# Patient Record
Sex: MALE | Race: WHITE | NOT HISPANIC OR LATINO | URBAN - METROPOLITAN AREA
[De-identification: names, ages, dates, MRNs, and addresses within clinical notes are randomized per-mention and may not be internally consistent; named-entity substitution may affect disease eponyms.]

---

## 2022-01-20 ENCOUNTER — APPOINTMENT (OUTPATIENT)
Dept: PHYSICAL THERAPY | Age: 47
End: 2022-01-20

## 2022-01-20 PROCEDURE — 97530 THERAPEUTIC ACTIVITIES: CPT

## 2024-01-29 ENCOUNTER — HOSPITAL ENCOUNTER (EMERGENCY)
Facility: HOSPITAL | Age: 49
Discharge: HOME | End: 2024-01-29
Attending: EMERGENCY MEDICINE | Admitting: EMERGENCY MEDICINE
Payer: COMMERCIAL

## 2024-01-29 ENCOUNTER — OFFICE VISIT (OUTPATIENT)
Dept: URGENT CARE | Age: 49
End: 2024-01-29
Payer: COMMERCIAL

## 2024-01-29 ENCOUNTER — APPOINTMENT (EMERGENCY)
Dept: RADIOLOGY | Facility: HOSPITAL | Age: 49
End: 2024-01-29
Payer: COMMERCIAL

## 2024-01-29 VITALS
BODY MASS INDEX: 34.05 KG/M2 | OXYGEN SATURATION: 96 % | TEMPERATURE: 98.4 F | DIASTOLIC BLOOD PRESSURE: 61 MMHG | HEART RATE: 88 BPM | WEIGHT: 204.59 LBS | SYSTOLIC BLOOD PRESSURE: 134 MMHG | RESPIRATION RATE: 15 BRPM

## 2024-01-29 VITALS
TEMPERATURE: 97.8 F | SYSTOLIC BLOOD PRESSURE: 109 MMHG | OXYGEN SATURATION: 97 % | DIASTOLIC BLOOD PRESSURE: 80 MMHG | RESPIRATION RATE: 18 BRPM | HEART RATE: 128 BPM

## 2024-01-29 DIAGNOSIS — R06.09 DYSPNEA ON EXERTION: Primary | ICD-10-CM

## 2024-01-29 DIAGNOSIS — R50.9 FEVER, UNSPECIFIED FEVER CAUSE: ICD-10-CM

## 2024-01-29 DIAGNOSIS — I47.20 VENTRICULAR TACHYCARDIA (HCC): Primary | ICD-10-CM

## 2024-01-29 DIAGNOSIS — I48.92 ATRIAL FLUTTER, UNSPECIFIED TYPE (HCC): ICD-10-CM

## 2024-01-29 LAB
2HR DELTA HS TROPONIN: 19 NG/L
ALBUMIN SERPL BCP-MCNC: 4.8 G/DL (ref 3.5–5)
ALP SERPL-CCNC: 60 U/L (ref 34–104)
ALT SERPL W P-5'-P-CCNC: 21 U/L (ref 7–52)
ANION GAP SERPL CALCULATED.3IONS-SCNC: 11 MMOL/L
AST SERPL W P-5'-P-CCNC: 18 U/L (ref 13–39)
BASOPHILS # BLD AUTO: 0.05 THOUSANDS/ÂΜL (ref 0–0.1)
BASOPHILS NFR BLD AUTO: 0 % (ref 0–1)
BILIRUB SERPL-MCNC: 1.32 MG/DL (ref 0.2–1)
BUN SERPL-MCNC: 16 MG/DL (ref 5–25)
CALCIUM SERPL-MCNC: 9.9 MG/DL (ref 8.4–10.2)
CARDIAC TROPONIN I PNL SERPL HS: 110 NG/L
CARDIAC TROPONIN I PNL SERPL HS: 91 NG/L
CHLORIDE SERPL-SCNC: 98 MMOL/L (ref 96–108)
CO2 SERPL-SCNC: 27 MMOL/L (ref 21–32)
CREAT SERPL-MCNC: 1.45 MG/DL (ref 0.6–1.3)
EOSINOPHIL # BLD AUTO: 0.11 THOUSAND/ÂΜL (ref 0–0.61)
EOSINOPHIL NFR BLD AUTO: 1 % (ref 0–6)
ERYTHROCYTE [DISTWIDTH] IN BLOOD BY AUTOMATED COUNT: 13.2 % (ref 11.6–15.1)
GFR SERPL CREATININE-BSD FRML MDRD: 56 ML/MIN/1.73SQ M
GLUCOSE SERPL-MCNC: 123 MG/DL (ref 65–140)
HCT VFR BLD AUTO: 46.9 % (ref 36.5–49.3)
HGB BLD-MCNC: 16 G/DL (ref 12–17)
IMM GRANULOCYTES # BLD AUTO: 0.04 THOUSAND/UL (ref 0–0.2)
IMM GRANULOCYTES NFR BLD AUTO: 0 % (ref 0–2)
LYMPHOCYTES # BLD AUTO: 2.06 THOUSANDS/ÂΜL (ref 0.6–4.47)
LYMPHOCYTES NFR BLD AUTO: 14 % (ref 14–44)
MAGNESIUM SERPL-MCNC: 1.7 MG/DL (ref 1.9–2.7)
MCH RBC QN AUTO: 30.8 PG (ref 26.8–34.3)
MCHC RBC AUTO-ENTMCNC: 34.1 G/DL (ref 31.4–37.4)
MCV RBC AUTO: 90 FL (ref 82–98)
MONOCYTES # BLD AUTO: 1.47 THOUSAND/ÂΜL (ref 0.17–1.22)
MONOCYTES NFR BLD AUTO: 10 % (ref 4–12)
NEUTROPHILS # BLD AUTO: 10.8 THOUSANDS/ÂΜL (ref 1.85–7.62)
NEUTS SEG NFR BLD AUTO: 75 % (ref 43–75)
NRBC BLD AUTO-RTO: 0 /100 WBCS
PLATELET # BLD AUTO: 290 THOUSANDS/UL (ref 149–390)
PMV BLD AUTO: 11.3 FL (ref 8.9–12.7)
POTASSIUM SERPL-SCNC: 3.9 MMOL/L (ref 3.5–5.3)
PROT SERPL-MCNC: 8.2 G/DL (ref 6.4–8.4)
RBC # BLD AUTO: 5.19 MILLION/UL (ref 3.88–5.62)
SARS-COV-2 AG UPPER RESP QL IA: NEGATIVE
SODIUM SERPL-SCNC: 136 MMOL/L (ref 135–147)
TSH SERPL DL<=0.05 MIU/L-ACNC: 2.61 UIU/ML (ref 0.45–4.5)
VALID CONTROL: NORMAL
WBC # BLD AUTO: 14.53 THOUSAND/UL (ref 4.31–10.16)

## 2024-01-29 PROCEDURE — 93005 ELECTROCARDIOGRAM TRACING: CPT

## 2024-01-29 PROCEDURE — 71045 X-RAY EXAM CHEST 1 VIEW: CPT

## 2024-01-29 PROCEDURE — 80053 COMPREHEN METABOLIC PANEL: CPT

## 2024-01-29 PROCEDURE — 84443 ASSAY THYROID STIM HORMONE: CPT

## 2024-01-29 PROCEDURE — 99291 CRITICAL CARE FIRST HOUR: CPT | Performed by: EMERGENCY MEDICINE

## 2024-01-29 PROCEDURE — 87811 SARS-COV-2 COVID19 W/OPTIC: CPT | Performed by: STUDENT IN AN ORGANIZED HEALTH CARE EDUCATION/TRAINING PROGRAM

## 2024-01-29 PROCEDURE — G0383 LEV 4 HOSP TYPE B ED VISIT: HCPCS | Performed by: STUDENT IN AN ORGANIZED HEALTH CARE EDUCATION/TRAINING PROGRAM

## 2024-01-29 PROCEDURE — 84484 ASSAY OF TROPONIN QUANT: CPT

## 2024-01-29 PROCEDURE — 36415 COLL VENOUS BLD VENIPUNCTURE: CPT

## 2024-01-29 PROCEDURE — 93005 ELECTROCARDIOGRAM TRACING: CPT | Performed by: STUDENT IN AN ORGANIZED HEALTH CARE EDUCATION/TRAINING PROGRAM

## 2024-01-29 PROCEDURE — 99285 EMERGENCY DEPT VISIT HI MDM: CPT

## 2024-01-29 PROCEDURE — 85025 COMPLETE CBC W/AUTO DIFF WBC: CPT

## 2024-01-29 PROCEDURE — 92960 CARDIOVERSION ELECTRIC EXT: CPT | Performed by: EMERGENCY MEDICINE

## 2024-01-29 PROCEDURE — 99152 MOD SED SAME PHYS/QHP 5/>YRS: CPT | Performed by: EMERGENCY MEDICINE

## 2024-01-29 PROCEDURE — 96374 THER/PROPH/DIAG INJ IV PUSH: CPT

## 2024-01-29 PROCEDURE — 83735 ASSAY OF MAGNESIUM: CPT

## 2024-01-29 PROCEDURE — 92960 CARDIOVERSION ELECTRIC EXT: CPT

## 2024-01-29 RX ORDER — ETOMIDATE 2 MG/ML
0.15 INJECTION INTRAVENOUS ONCE
Status: COMPLETED | OUTPATIENT
Start: 2024-01-29 | End: 2024-01-29

## 2024-01-29 RX ADMIN — APIXABAN 5 MG: 5 TABLET, FILM COATED ORAL at 15:09

## 2024-01-29 RX ADMIN — ETOMIDATE 10 MG: 2 INJECTION INTRAVENOUS at 13:52

## 2024-01-29 NOTE — DISCHARGE INSTRUCTIONS
You were seen and evaluated in the emergency department for rapid heart rate and shortness of breath with exertion.  Were in rapid a flutter was successfully cardioverted to normal sinus rhythm.  Will send anticoagulation medication to the pharmacy it is 10 mg twice a day for 1 week followed by 5 mg twice a day for the remaining 2 weeks.  Please follow-up with your PCP within 48 hours.  If you do not have 1 call the info link number above to establish care.  Will also send referral to cardiology please follow-up with them.  If your symptoms worsen persist or recur please return to the emergency department for further evaluation and management

## 2024-01-29 NOTE — ED PROVIDER NOTES
History  Chief Complaint   Patient presents with    Shortness of Breath     Pt reports dyspnea on exertion since Saturday. C/o L sided abdominal pain, vomiting, and heart burn on Saturday, but has mostly since resolved. Pt sent for eval from [facility].     Patient is a 48-year-old male presenting for evaluation of shortness of breath.  Patient states that he was ill with a GI bug several days ago had nausea vomiting diarrhea at that time.  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  States that it is improved with rest.  Also endorses some palpitations denies chest pain fever chills nausea vomiting diaphoresis abdominal pain neck pain back pain numbness weakness tingling or any other complaints at this time.  States that this is never happened before.          None       Past Medical History:   Diagnosis Date    Asthma     Heart murmur        No past surgical history on file.    No family history on file.  I have reviewed and agree with the history as documented.    E-Cigarette/Vaping     E-Cigarette/Vaping Substances     Social History     Tobacco Use    Smoking status: Never     Passive exposure: Never    Smokeless tobacco: Never        Review of Systems   Constitutional:  Negative for chills and fever.   HENT:  Negative for congestion.    Respiratory:  Positive for shortness of breath. Negative for cough.    Cardiovascular:  Positive for palpitations. Negative for chest pain.   Gastrointestinal:  Negative for abdominal pain, nausea and vomiting.   Musculoskeletal:  Negative for back pain and neck pain.   Skin:  Negative for rash.   Neurological:  Negative for weakness, numbness and headaches.   All other systems reviewed and are negative.      Physical Exam  ED Triage Vitals [01/29/24 1204]   Temperature Pulse Respirations Blood Pressure SpO2   98.4 °F (36.9 °C)  (!) 130 (!) 26 123/73 99 %      Temp Source Heart Rate Source Patient Position - Orthostatic VS BP Location FiO2 (%)   Oral Monitor -- -- --      Pain Score       No Pain             Orthostatic Vital Signs  Vitals:    01/29/24 1355 01/29/24 1401 01/29/24 1405 01/29/24 1410   BP: 147/81 (!) 200/100 133/68 133/68   Pulse: 92 99 97 91       Physical Exam  Vitals and nursing note reviewed.   Constitutional:       General: He is not in acute distress.     Appearance: He is well-developed. He is not ill-appearing or diaphoretic.   HENT:      Head: Normocephalic and atraumatic.      Mouth/Throat:      Mouth: Mucous membranes are moist.   Eyes:      Extraocular Movements: Extraocular movements intact.      Conjunctiva/sclera: Conjunctivae normal.   Cardiovascular:      Rate and Rhythm: Regular rhythm. Tachycardia present.      Heart sounds: No murmur heard.  Pulmonary:      Effort: Pulmonary effort is normal. No respiratory distress.      Breath sounds: Normal breath sounds.   Abdominal:      Palpations: Abdomen is soft.      Tenderness: There is no abdominal tenderness.   Musculoskeletal:         General: No swelling. Normal range of motion.      Cervical back: Normal range of motion and neck supple.      Right lower leg: No edema.      Left lower leg: No edema.   Skin:     General: Skin is warm and dry.      Capillary Refill: Capillary refill takes less than 2 seconds.   Neurological:      General: No focal deficit present.      Mental Status: He is alert and oriented to person, place, and time.         ED Medications  Medications   apixaban (ELIQUIS) tablet 5 mg (has no administration in time range)   etomidate (AMIDATE) 2 mg/mL injection 14 mg (10 mg Intravenous Given 1/29/24 1352)       Diagnostic Studies  Results Reviewed       Procedure Component Value Units Date/Time    TSH, 3rd generation with Free T4 reflex [[id number]]  (Normal) Collected: 01/29/24 1235    Lab Status: Final result Specimen: Blood from Arm, Left  Updated: 01/29/24 1406     TSH 3RD GENERATON 2.613 uIU/mL     HS Troponin I 2hr [[id number]]     Lab Status: No result Specimen: Blood     HS Troponin 0hr (reflex protocol) [[id number]]  (Abnormal) Collected: 01/29/24 1235    Lab Status: Final result Specimen: Blood from Arm, Left Updated: 01/29/24 1324     hs TnI 0hr 91 ng/L     Comprehensive metabolic panel [[id number]]  (Abnormal) Collected: 01/29/24 1235    Lab Status: Final result Specimen: Blood from Arm, Left Updated: 01/29/24 1305     Sodium 136 mmol/L      Potassium 3.9 mmol/L      Chloride 98 mmol/L      CO2 27 mmol/L      ANION GAP 11 mmol/L      BUN 16 mg/dL      Creatinine 1.45 mg/dL      Glucose 123 mg/dL      Calcium 9.9 mg/dL      AST 18 U/L      ALT 21 U/L      Alkaline Phosphatase 60 U/L      Total Protein 8.2 g/dL      Albumin 4.8 g/dL      Total Bilirubin 1.32 mg/dL      eGFR 56 ml/min/1.73sq m     Narrative:      National Kidney Disease Foundation guidelines for Chronic Kidney Disease (CKD):     Stage 1 with normal or high GFR (GFR > 90 mL/min/1.73 square meters)    Stage 2 Mild CKD (GFR = 60-89 mL/min/1.73 square meters)    Stage 3A Moderate CKD (GFR = 45-59 mL/min/1.73 square meters)    Stage 3B Moderate CKD (GFR = 30-44 mL/min/1.73 square meters)    Stage 4 Severe CKD (GFR = 15-29 mL/min/1.73 square meters)    Stage 5 End Stage CKD (GFR <15 mL/min/1.73 square meters)  Note: GFR calculation is accurate only with a steady state creatinine    Magnesium [[id number]]  (Abnormal) Collected: 01/29/24 1235    Lab Status: Final result Specimen: Blood from Arm, Left Updated: 01/29/24 1305     Magnesium 1.7 mg/dL     CBC and differential [[id number]]  (Abnormal) Collected: 01/29/24 1235    Lab Status: Final result Specimen: Blood from Arm, Left Updated: 01/29/24 1247     WBC 14.53 Thousand/uL      RBC 5.19 Million/uL      Hemoglobin 16.0 g/dL      Hematocrit 46.9 %      MCV 90 fL      MCH 30.8 pg      MCHC 34.1 g/dL      RDW 13.2 %      MPV 11.3 fL       Platelets 290 Thousands/uL      nRBC 0 /100 WBCs      Neutrophils Relative 75 %      Immat GRANS % 0 %      Lymphocytes Relative 14 %      Monocytes Relative 10 %      Eosinophils Relative 1 %      Basophils Relative 0 %      Neutrophils Absolute 10.80 Thousands/µL      Immature Grans Absolute 0.04 Thousand/uL      Lymphocytes Absolute 2.06 Thousands/µL      Monocytes Absolute 1.47 Thousand/µL      Eosinophils Absolute 0.11 Thousand/µL      Basophils Absolute 0.05 Thousands/µL                    XR chest 1 view portable   ED Interpretation by Willian Dean DO (01/29 1150)   Wet read - normal cxr            Procedures  Cardioversion    Date/Time: 1/29/2024 2:03 PM    Performed by: Willian Dean DO  Authorized by: Willian Dean DO    Verbal consent obtained?: Yes    Written consent obtained?: Yes    Risks and benefits: Risks, benefits and alternatives were discussed    Consent given by:  Patient  Patient states understanding of procedure being performed: Yes    Patient's understanding of procedure matches consent: Yes    Patient identity confirmed:  Arm band and verbally with patient  Time out: Immediately prior to the procedure a time out was called    Patient sedated: Yes    Sedation type: moderate (conscious) sedation    Sedation:  Etomidate  Sedation start:  1/29/2024 2:04 PM  Sedation end:  1/29/2024 2:04 PM  Cardioversion basis:  Elective  Pre-procedure rhythm:  Atrial flutter  Position: Patient was placed in a supine position    Chest area exposed: Chest area was exposed    Electrodes:  Pads  Electrodes placed:  Anterior-lateral  Number of attempts:  1  Attempt 1:     Attempt 1 mode:  Synchronous    Attempt 1 waveform:  Biphasic    Attempt 1 shock (Joules):  200    Attempt 1 outcome:  Conversion to normal sinus rhythm  Post-procedure rhythm:  Normal sinus rhythm  Complications: no complications    Patient tolerance:  Patient tolerated the procedure well with no immediate complications  Pre-Procedural  Sedation    Performed by: Willian Dean DO  Authorized by: Willian Dean DO    Consent:     Consent obtained:  Written and verbal    Consent given by:  Patient    Risks discussed:  Allergic reaction, dysrhythmia, inadequate sedation, respiratory compromise necessitating ventilatory assistance and intubation, prolonged sedation necessitating reversal, prolonged hypoxia resulting in organ damage, nausea and vomiting  Universal protocol:     Procedure explained and questions answered to patient or proxy's satisfaction: yes      Relevant documents present and verified: yes      Patient identity confirmation method:  Arm band and verbally with patient  Indications:     Sedation purpose:  Cardioversion    Procedure necessitating sedation performed by:  Physician performing sedation    Intended level of sedation:  Moderate (conscious sedation)  Pre-sedation assessment:     NPO status caution: unable to specify NPO status      ASA classification: class 1 - normal, healthy patient      Neck mobility: normal      Mouth opening:  3 or more finger widths    Pre-sedation assessments completed and reviewed: airway patency, cardiovascular function, hydration status, mental status, nausea/vomiting, pain level, respiratory function and temperature      Pre-sedation assessment completed:  1/29/2024 2:05 PM  Comments:      Dr. Annamarie Page assisting   Procedural Sedation    Date/Time: 1/29/2024 2:06 PM    Performed by: Willian Dean DO  Authorized by: Willian Dean DO    Immediate pre-procedure details:     Reassessment: Patient reassessed immediately prior to procedure      Reviewed: vital signs and relevant labs/tests      Verified: bag valve mask available, emergency equipment available, intubation equipment available, IV patency confirmed, oxygen available and suction available    Procedure details (see MAR for exact dosages):     Sedation start time:  1/29/2024 2:06 PM    Preoxygenation:  Nasal cannula    Sedation:   Etomidate    Intra-procedure monitoring:  Blood pressure monitoring, continuous capnometry and continuous pulse oximetry    Intra-procedure events: none      Total sedation time (minutes):  5  Post-procedure details:     Attendance: Constant attendance by certified staff until patient recovered      Recovery: Patient returned to pre-procedure baseline      Post-sedation assessments completed and reviewed: airway patency, cardiovascular function, hydration status, mental status, nausea/vomiting, pain level, respiratory function and temperature      Patient is stable for discharge or admission: yes      Patient tolerance:  Tolerated well, no immediate complications  Comments:      Dr. Annamarie Page assisting   ECG 12 Lead Documentation Only    Date/Time: 2/1/2024 5:28 PM    Performed by: Willian Dean DO  Authorized by: Willian Dean DO    Indications / Diagnosis:  Dyspnea on exertion  ECG reviewed by me, the ED Provider: yes    Patient location:  ED  Interpretation:     Interpretation: abnormal    Rate:     ECG rate:  133    ECG rate assessment: tachycardic    Rhythm:     Rhythm: atrial flutter    Ectopy:     Ectopy: none    QRS:     QRS axis:  Left    QRS intervals:  Wide  Conduction:     Conduction: abnormal    ST segments:     ST segments:  Normal  T waves:     T waves: normal    ECG 12 Lead Documentation Only    Date/Time: 2/1/2024 5:30 PM    Performed by: Willian Dean DO  Authorized by: Willian Dean DO    Indications / Diagnosis:  Repeat status post cardioversion  Patient location:  ED  Previous ECG:     Previous ECG:  Compared to current    Similarity:  Changes noted  Interpretation:     Interpretation: non-specific    Rate:     ECG rate:  90    ECG rate assessment: normal    Rhythm:     Rhythm: sinus rhythm    Ectopy:     Ectopy: none    QRS:     QRS axis:  Left    QRS intervals:  Normal  Conduction:     Conduction: abnormal      Abnormal conduction: non-specific intraventricular conduction delay     ST segments:     ST segments:  Normal  T waves:     T waves: normal          ED Course                             SBIRT 22yo+      Flowsheet Row Most Recent Value   Initial Alcohol Screen: US AUDIT-C     1. How often do you have a drink containing alcohol? 2 Filed at: 01/29/2024 1209   2. How many drinks containing alcohol do you have on a typical day you are drinking?  0 Filed at: 01/29/2024 1209   3a. Male UNDER 65: How often do you have five or more drinks on one occasion? 0 Filed at: 01/29/2024 1209   Audit-C Score 2 Filed at: 01/29/2024 1209   LUCRECIA: How many times in the past year have you...    Used an illegal drug or used a prescription medication for non-medical reasons? Never Filed at: 01/29/2024 1209                  Medical Decision Making  Patient is a 48-year-old male presenting for evaluation of dyspnea on exertion    On monitor patient appears to be in a flutter.  He is consistently 133 bpm is regular, minimally wide complex however could be a flutter with bundle branch block.  Otherwise hemodynamically stable is normotensive not hypoxic.  Discussed with patient he is certain that this is the first time that this is ever happened.  Shared decision making risk versus benefits of cardioversion versus pharmacologic intervention patient wishes to undergo cardioversion see above procedure notes.  Will check labs EKG chest x-ray.  Monitor and reassess.  Final dispo pending.    Cardioversion successful patient in normal sinus rhythm.  Labs unremarkable.  First dose of Eliquis given in department prescription sent to pharmacy.  Referral sent to cardiology.  Patient to follow-up with his PCP as well.  Discussed plan and return precautions with patient who is in agreement, verbalizes understanding.  Hemodynamically stable and cleared for discharge    Amount and/or Complexity of Data Reviewed  Labs: ordered.  Radiology: ordered and independent interpretation performed.    Risk  Prescription drug  management.          Disposition  Final diagnoses:   Dyspnea on exertion   Atrial flutter, unspecified type (HCC)     Time reflects when diagnosis was documented in both MDM as applicable and the Disposition within this note       Time User Action Codes Description Comment    1/29/2024  2:18 PM Willian Dean [R06.09] Dyspnea on exertion     1/29/2024  2:19 PM Willian Dean [I48.92] Atrial flutter, unspecified type (HCC)           ED Disposition       ED Disposition   Discharge    Condition   Stable    Date/Time   Mon Jan 29, 2024 1418    Comment   Cricket Reyes discharge to home/self care.                   Follow-up Information    None         Patient's Medications    No medications on file     No discharge procedures on file.    PDMP Review       None             ED Provider  Attending physically available and evaluated Cricket Reyes. I managed the patient along with the ED Attending.    Electronically Signed by           Willian Dean DO  02/01/24 2449

## 2024-01-29 NOTE — PROGRESS NOTES
[facility]        NAME: Cricket Reyes is a 48 y.o. male  : 1975    MRN: [id number]  DATE: 2024  TIME: 11:53 AM    Assessment and Orders   Ventricular tachycardia (HCC) [I47.20]  1. Ventricular tachycardia (HCC)  CANCELED: XR chest pa & lateral      2. Fever, unspecified fever cause  Poct Covid 19 Rapid Antigen Test            Plan and Discussion      Patient present with palpitation and SOB. Patient ill over the weekend with nausea, diarrhea, vomiting and fever. Went to work this morning and felt short of breath with palpitations. EKG shows widened QRS with HR of 134.  EMS called. Patient transferred to ED for further evaluation and treatment.       Discussed ED precautions including (but not limited to)  Difficultly breathing or shortness of breath  Chest pain  Acutely worsening symptoms.     Risks and benefits discussed. Patient understands and agrees with the plan.     Follow up with PCP.     Chief Complaint     Chief Complaint   Patient presents with    Headache    Shortness of Breath    Generalized Body Aches    Fatigue    Vomiting    Abdominal Pain    Chest Pain     Patient states that his symptoms listed above started on Saturday evening and have no got better. The only time he feels comfortable is when resting. Patient is alert and oriented and the RN or DR remain with patient through out the visit for continuous assessments.         History of Present Illness       Sick over the weekend with nausea, vomiting, and diarrhea. Had fever on Saturday. Son was also sick.     Shortness of Breath  The current episode started today. The problem occurs constantly. The problem has been gradually worsening since onset. Associated symptoms include chest pressure, dizziness, fatigue and palpitations. Pertinent negatives include no chest pain, coughing or wheezing.   Generalized Body Aches  Associated symptoms include fatigue and shortness of breath. Pertinent negatives include no chest pain,  coughing or wheezing.   Fatigue  Associated symptoms include fatigue. Pertinent negatives include no chest pain or coughing.   Vomiting   Associated symptoms include dizziness. Pertinent negatives include no chest pain or coughing.   Abdominal Pain    Chest Pain   Associated symptoms include dizziness, palpitations and shortness of breath. Pertinent negatives include no cough.       Review of Systems   Review of Systems   Constitutional:  Positive for fatigue.   Respiratory:  Positive for shortness of breath. Negative for cough and wheezing.    Cardiovascular:  Positive for palpitations. Negative for chest pain.   Neurological:  Positive for dizziness.         Current Medications     No current outpatient medications on file.    Current Allergies     Allergies as of 01/29/2024    (No Known Allergies)            The following portions of the patient's history were reviewed and updated as appropriate: allergies, current medications, past family history, past medical history, past social history, past surgical history and problem list.     No past medical history on file.    No past surgical history on file.    No family history on file.      Medications have been verified.        Objective   /80   Pulse (!) 128   Temp 97.8 °F (36.6 °C)   Resp 18   SpO2 97%   No LMP for male patient.       Physical Exam     Physical Exam  Cardiovascular:      Rate and Rhythm: Tachycardia present.      Pulses: Normal pulses.      Heart sounds: Murmur heard.      Comments: Patient worse when lying back.   Pulmonary:      Effort: Tachypnea present. No respiratory distress.      Breath sounds: Normal breath sounds.   Abdominal:      Tenderness: There is abdominal tenderness (mild, generalized).   Musculoskeletal:      Right lower leg: No edema.      Left lower leg: No edema.   Skin:     Coloration: Skin is pale.   Neurological:      General: No focal deficit present.      Mental Status: He is alert and oriented to person, place,  and time.               Nayla Crane DO

## 2024-01-29 NOTE — Clinical Note
Cricket Reyes was seen and treated in our emergency department on 1/29/2024.    No restrictions            Diagnosis: atrial flutter    Cricket  may return to work on return date.    He may return on this date: 01/30/2024         If you have any questions or concerns, please don't hesitate to call.      Willian Dean, DO    ______________________________           _______________          _______________  Hospital Representative                              Date                                Time

## 2024-01-29 NOTE — ED ATTENDING ATTESTATION
Final Diagnoses:   No diagnosis found.  ED Course as of 01/29/24 1413   Mon Jan 29, 2024   1413 Repeat EKG after cardioversion shows sinus rhythm without any ST changes still nonspecific intraventricular conduction delay       INestor DO, saw and evaluated the patient. All available labs and X-rays were ordered by me or the resident / non-physician and have been reviewed by myself. I discussed the patient with the resident / non-physician and agree with the resident's / non-physician practitioner's findings and plan as documented in the resident's / non-physician practicitioner's note, except where noted.   At this point, I agree with the current assessment done in the ED.   I was present during key portions of all procedures performed unless otherwise stated.     Nursing Triage:     Chief Complaint   Patient presents with    Shortness of Breath     Pt reports dyspnea on exertion since Saturday. C/o L sided abdominal pain, vomiting, and heart burn on Saturday, but has mostly since resolved. Pt sent for eval from [facility].       HPI:   This is a 48 y.o. male presenting for evaluation of palpitations.  He was feeling sick for the past few days.  Woke up today was feeling slightly better but then developed palpitations.  No chest pain right now.  No shortness of breath until today.  No previous history of similar instances.  Was at urgent care and sent here    ASSESSMENT + PLAN:   Palpitations EKG interpreted by me as atrial flutter 2-1 block with intraventricular conduction delay at a rate of 133 no ST changes  Plan cardioversion, anticoagulation, labs for metabolic derangement IV fluids    Physical:     Vital signs reviewed.  Gen. appearance: No acute distress.   Head: Atraumatic, normocephalic.  Eyes: EOMI, PERRLA. No icterus.  Neck: Supple, no lymphadenopathy, full range of motion.  Chest: Regular rate and rhythm. Lungs are clear to auscultation bilaterally. Nontender.  Abdomen: Soft nontender  "nondistended. No signs of ecchymosis. Positive bowel sounds.  Extremities: Full range of motion in all extremities.  Neuro: non focal exam  Skin: Warm, dry.            Past Medical:    has a past medical history of Asthma and Heart murmur.    Past Surgical:    has no past surgical history on file.      XR chest 1 view portable   ED Interpretation by Willian Dean DO (01/29 5972)   Wet read - normal cxr          CriticalCare Time    Date/Time: 1/29/2024 2:13 PM    Performed by: Nestor Davila DO  Authorized by: Nestor Davila DO    Critical care provider statement:     Critical care time (minutes):  30    Critical care time was exclusive of:  Separately billable procedures and treating other patients and teaching time    Critical care was necessary to treat or prevent imminent or life-threatening deterioration of the following conditions:  Cardiac failure    Critical care was time spent personally by me on the following activities:  Blood draw for specimens, obtaining history from patient or surrogate, re-evaluation of patient's condition, review of old charts, evaluation of patient's response to treatment, examination of patient, ordering and review of laboratory studies, ordering and performing treatments and interventions, development of treatment plan with patient or surrogate and ordering and review of radiographic studies        I reviewed patient's most recent discharge summary and/or outpatient office notes.      Portions of the record may have been created with voice recognition software. Occasional wrong word or \"sound a like\" substitutions may have occurred due to the inherent limitations of voice recognition software. Read the chart carefully and recognize, using context, where substitutions have occurred.    Electronically signed:  Nestor Davila DO  "

## 2024-01-30 ENCOUNTER — TELEPHONE (OUTPATIENT)
Age: 49
End: 2024-01-30

## 2024-01-30 ENCOUNTER — OFFICE VISIT (OUTPATIENT)
Dept: FAMILY MEDICINE CLINIC | Facility: CLINIC | Age: 49
End: 2024-01-30
Payer: COMMERCIAL

## 2024-01-30 ENCOUNTER — TELEPHONE (OUTPATIENT)
Dept: FAMILY MEDICINE CLINIC | Facility: CLINIC | Age: 49
End: 2024-01-30

## 2024-01-30 VITALS
TEMPERATURE: 97.3 F | BODY MASS INDEX: 36.29 KG/M2 | HEART RATE: 84 BPM | DIASTOLIC BLOOD PRESSURE: 66 MMHG | OXYGEN SATURATION: 98 % | WEIGHT: 212.6 LBS | HEIGHT: 64 IN | SYSTOLIC BLOOD PRESSURE: 132 MMHG

## 2024-01-30 DIAGNOSIS — Z12.5 PROSTATE CANCER SCREENING: ICD-10-CM

## 2024-01-30 DIAGNOSIS — R21 SKIN RASH: ICD-10-CM

## 2024-01-30 DIAGNOSIS — Z11.4 SCREENING FOR HIV (HUMAN IMMUNODEFICIENCY VIRUS): ICD-10-CM

## 2024-01-30 DIAGNOSIS — E83.42 HYPOMAGNESEMIA: ICD-10-CM

## 2024-01-30 DIAGNOSIS — I48.92 ATRIAL FLUTTER, UNSPECIFIED TYPE (HCC): Primary | ICD-10-CM

## 2024-01-30 DIAGNOSIS — I35.8 HEART MURMUR, AORTIC: ICD-10-CM

## 2024-01-30 DIAGNOSIS — I48.92 ATRIAL FLUTTER, UNSPECIFIED TYPE (HCC): ICD-10-CM

## 2024-01-30 DIAGNOSIS — Z12.11 COLON CANCER SCREENING: Primary | ICD-10-CM

## 2024-01-30 DIAGNOSIS — E78.00 HYPERCHOLESTEREMIA: ICD-10-CM

## 2024-01-30 DIAGNOSIS — Z11.59 NEED FOR HEPATITIS C SCREENING TEST: ICD-10-CM

## 2024-01-30 LAB
ATRIAL RATE: 268 BPM
ATRIAL RATE: 268 BPM
ATRIAL RATE: 90 BPM
P AXIS: 260 DEGREES
P AXIS: 263 DEGREES
P AXIS: 63 DEGREES
PR INTERVAL: 140 MS
QRS AXIS: -62 DEGREES
QRS AXIS: 241 DEGREES
QRS AXIS: 255 DEGREES
QRSD INTERVAL: 126 MS
QRSD INTERVAL: 128 MS
QRSD INTERVAL: 130 MS
QT INTERVAL: 316 MS
QT INTERVAL: 344 MS
QT INTERVAL: 348 MS
QTC INTERVAL: 425 MS
QTC INTERVAL: 471 MS
QTC INTERVAL: 513 MS
T WAVE AXIS: 30 DEGREES
T WAVE AXIS: 41 DEGREES
T WAVE AXIS: 89 DEGREES
VENTRICULAR RATE: 134 BPM
VENTRICULAR RATE: 134 BPM
VENTRICULAR RATE: 90 BPM

## 2024-01-30 PROCEDURE — 99204 OFFICE O/P NEW MOD 45 MIN: CPT | Performed by: FAMILY MEDICINE

## 2024-01-30 RX ORDER — UREA 10 %
500 LOTION (ML) TOPICAL DAILY
Qty: 30 TABLET | Refills: 3 | Status: SHIPPED | OUTPATIENT
Start: 2024-01-30

## 2024-01-30 NOTE — LETTER
January 30, 2024     Patient: Cricket Reyes  YOB: 1975  Date of Visit: 1/30/2024      To Whom it May Concern:    Cricket Reyes is under my professional care. Cricket was seen in my office on 1/30/2024. Cricket may return to work on 1/31/2024 .    If you have any questions or concerns, please don't hesitate to call.         Sincerely,                Tushar Mahoney, DO

## 2024-01-30 NOTE — TELEPHONE ENCOUNTER
Pt contacted the office again looking for a update on his medication refill. Call was warm transferred to office no additional questions

## 2024-01-30 NOTE — PROGRESS NOTES
Name: Cricket Reyes      : 1975      MRN: [id number]  Encounter Provider: Tushar Mahoney DO  Encounter Date: 2024   Encounter department: [facility]    Chief Complaint   Patient presents with    New Patient Visit    Follow-up     From ED      BMI Counseling: Body mass index is 36.49 kg/m². The BMI is above normal. Nutrition recommendations include reducing portion sizes, consuming healthier snacks, moderation in carbohydrate intake, and increasing intake of lean protein.    PHQ-2/9 Depression Screening    Little interest or pleasure in doing things: 0 - not at all  Feeling down, depressed, or hopeless: 0 - not at all  PHQ-2 Score: 0  PHQ-2 Interpretation: Negative depression screen         Assessment & Plan     1. Colon cancer screening  -     Cologuard  -     Cologuard    2. Need for hepatitis C screening test    3. Screening for HIV (human immunodeficiency virus)    4. Atrial flutter, unspecified type (HCC)  -     Ambulatory Referral to Cardiology; Future  -     Echo complete w/ contrast if indicated; Future; Expected date: 2024    5. Hypomagnesemia  -     magnesium gluconate (MAGONATE) 500 mg tablet; Take 1 tablet (500 mg total) by mouth in the morning    6. Hypercholesteremia  -     Cholesterol, total; Future    7. Prostate cancer screening  -     PSA, Total Screen; Future    8. Skin rash  -     Ambulatory Referral to Dermatology; Future    9. Heart murmur, aortic           Subjective     Seen in ER, Atrial flutter.  Pleasant pt.  SH: Neg tob, social [place] weekends.  [place]  Weight , frequents the GYM.  Rash posterior hair line.  Non successful treatment so far.      Review of Systems   Constitutional: Negative.    HENT: Negative.     Eyes: Negative.    Respiratory: Negative.     Cardiovascular: Negative.    Gastrointestinal: Negative.    Genitourinary: Negative.    Musculoskeletal: Negative.    Skin:  Positive for rash.   Neurological: Negative.   "  Psychiatric/Behavioral: Negative.         Past Medical History:   Diagnosis Date    Asthma     Heart murmur      Past Surgical History:   Procedure Laterality Date    ARTHROSCOPIC REPAIR ACL Right     HERNIA REPAIR       Family History   Problem Relation Age of Onset    Arthritis Father     LUDA disease Father     Cancer Sister     Epididymitis Daughter     Diabetes Paternal Aunt     Diabetes Paternal Uncle      Social History     Socioeconomic History    Marital status: Single     Spouse name: None    Number of children: None    Years of education: None    Highest education level: None   Occupational History    None   Tobacco Use    Smoking status: Never     Passive exposure: Never    Smokeless tobacco: Never   Vaping Use    Vaping status: Never Used   Substance and Sexual Activity    Alcohol use: None    Drug use: None    Sexual activity: None   Other Topics Concern    None   Social History Narrative    None     Social Determinants of Health     Financial Resource Strain: Not on file   Food Insecurity: Not on file   Transportation Needs: Not on file   Physical Activity: Not on file   Stress: Not on file   Social Connections: Not on file   Intimate Partner Violence: Not on file   Housing Stability: Not on file     Current Outpatient Medications on File Prior to Visit   Medication Sig    apixaban (Eliquis) 5 mg Take 1 tablet (5 mg total) by mouth 2 (two) times a day for 28 days Take 10mg twice a day for the first week and then 5mg twice a day for the remainder     No Known Allergies  Immunization History   Administered Date(s) Administered    COVID-19 PFIZER VACCINE 0.3 ML IM 11/12/2021, 12/03/2021    COVID-19 Pfizer vac (Will-sucrose, gray cap) 12 yr+ IM 07/13/2022       Objective     /66 (BP Location: Left arm, Patient Position: Sitting, Cuff Size: Standard)   Pulse 84   Temp (!) 97.3 °F (36.3 °C) (Temporal)   Ht 5' 4\" (1.626 m)   Wt 96.4 kg (212 lb 9.6 oz)   SpO2 98%   BMI 36.49 kg/m²     Physical " Exam  Constitutional:       Appearance: He is well-developed.   HENT:      Head: Normocephalic and atraumatic.      Right Ear: Tympanic membrane, ear canal and external ear normal.      Left Ear: Tympanic membrane, ear canal and external ear normal.      Nose: Nose normal.      Mouth/Throat:      Mouth: Mucous membranes are moist.      Pharynx: Oropharynx is clear.   Eyes:      Conjunctiva/sclera: Conjunctivae normal.      Pupils: Pupils are equal, round, and reactive to light.   Cardiovascular:      Rate and Rhythm: Normal rate and regular rhythm.      Pulses: Normal pulses.      Heart sounds: Normal heart sounds.      Comments: Mild Aortic valve murmur.  Pulmonary:      Effort: Pulmonary effort is normal.      Breath sounds: Normal breath sounds.   Abdominal:      General: Bowel sounds are normal.      Palpations: Abdomen is soft.   Musculoskeletal:      Cervical back: Normal range of motion and neck supple.   Skin:     General: Skin is warm and dry.      Findings: Rash present.      Comments: Hair line back of head.   Neurological:      Mental Status: He is alert and oriented to person, place, and time.      Deep Tendon Reflexes: Reflexes are normal and symmetric.   Psychiatric:         Mood and Affect: Mood normal.         Behavior: Behavior normal.         Thought Content: Thought content normal.         Judgment: Judgment normal.       Tushar Mahoney,

## 2024-01-30 NOTE — TELEPHONE ENCOUNTER
PA for apixaban (Eliquis) 5 mg     Submitted via  []CMM-KEY   [x][employer]-Case ID # [id number]   []Faxed to plan   []Other website   []Phone call Case ID #     Office notes sent, clinical questions answered. Awaiting determination

## 2024-01-30 NOTE — TELEPHONE ENCOUNTER
Patient called the RX Refill Line. Message is being forwarded to the office.     Patient is requesting he said that he was going to be prescribed  a 90 day supply of Xarelto 10mg but it looks like Eliquis 5mg was called in. Pt also stated he was supposed to get a coupon card for it as well. Please advise.    Please contact patient at

## 2024-01-30 NOTE — TELEPHONE ENCOUNTER
PA for apixaban (Eliquis) 5 mg  Approved   Date(s) approved 1/30/24-1/29/25  Case #[id number]    Patient advised by [] [employer] Message                      [x] Phone call       Pharmacy advised by [x]Fax                                     []Phone call    Approval letter scanned into Media No -Did not receive yet

## 2024-01-30 NOTE — TELEPHONE ENCOUNTER
Patient called back, Eliquis is expensive and he wants to try Xarelto. Please send a new script to pharmacy on file

## 2024-01-31 ENCOUNTER — TELEPHONE (OUTPATIENT)
Age: 49
End: 2024-01-31

## 2024-01-31 NOTE — TELEPHONE ENCOUNTER
PA for rivaroxaban (XARELTO) 20 mg tablet     Submitted via  []CMM-KEY   [x][employer]-Case ID # [id number]   []Faxed to plan   []Other website   []Phone call Case ID #     Office notes sent, clinical questions answered. Awaiting determination

## 2024-02-03 ENCOUNTER — HOSPITAL ENCOUNTER (INPATIENT)
Facility: HOSPITAL | Age: 49
LOS: 2 days | Discharge: HOME | DRG: 418 | End: 2024-02-05
Attending: EMERGENCY MEDICINE | Admitting: SURGERY
Payer: COMMERCIAL

## 2024-02-03 ENCOUNTER — APPOINTMENT (EMERGENCY)
Dept: RADIOLOGY | Facility: HOSPITAL | Age: 49
DRG: 418 | End: 2024-02-03
Payer: COMMERCIAL

## 2024-02-03 ENCOUNTER — APPOINTMENT (INPATIENT)
Dept: RADIOLOGY | Facility: HOSPITAL | Age: 49
DRG: 418 | End: 2024-02-03
Payer: COMMERCIAL

## 2024-02-03 DIAGNOSIS — K81.0 ACUTE CHOLECYSTITIS: Primary | ICD-10-CM

## 2024-02-03 LAB
2HR DELTA HS TROPONIN: 3 NG/L
ALBUMIN SERPL BCP-MCNC: 4.2 G/DL (ref 3.5–5)
ALP SERPL-CCNC: 52 U/L (ref 34–104)
ALT SERPL W P-5'-P-CCNC: 32 U/L (ref 7–52)
ANION GAP SERPL CALCULATED.3IONS-SCNC: 9 MMOL/L
AST SERPL W P-5'-P-CCNC: 20 U/L (ref 13–39)
BASOPHILS # BLD AUTO: 0.02 THOUSANDS/ÂΜL (ref 0–0.1)
BASOPHILS NFR BLD AUTO: 0 % (ref 0–1)
BILIRUB SERPL-MCNC: 0.52 MG/DL (ref 0.2–1)
BUN SERPL-MCNC: 15 MG/DL (ref 5–25)
CALCIUM SERPL-MCNC: 8.7 MG/DL (ref 8.4–10.2)
CARDIAC TROPONIN I PNL SERPL HS: 42 NG/L
CARDIAC TROPONIN I PNL SERPL HS: 45 NG/L
CHLORIDE SERPL-SCNC: 103 MMOL/L (ref 96–108)
CO2 SERPL-SCNC: 22 MMOL/L (ref 21–32)
CREAT SERPL-MCNC: 1.07 MG/DL (ref 0.6–1.3)
EOSINOPHIL # BLD AUTO: 0.11 THOUSAND/ÂΜL (ref 0–0.61)
EOSINOPHIL NFR BLD AUTO: 1 % (ref 0–6)
ERYTHROCYTE [DISTWIDTH] IN BLOOD BY AUTOMATED COUNT: 12.9 % (ref 11.6–15.1)
GFR SERPL CREATININE-BSD FRML MDRD: 81 ML/MIN/1.73SQ M
GLUCOSE SERPL-MCNC: 114 MG/DL (ref 65–140)
HCT VFR BLD AUTO: 39 % (ref 36.5–49.3)
HGB BLD-MCNC: 13.8 G/DL (ref 12–17)
IMM GRANULOCYTES # BLD AUTO: 0.02 THOUSAND/UL (ref 0–0.2)
IMM GRANULOCYTES NFR BLD AUTO: 0 % (ref 0–2)
LIPASE SERPL-CCNC: 33 U/L (ref 11–82)
LYMPHOCYTES # BLD AUTO: 1.56 THOUSANDS/ÂΜL (ref 0.6–4.47)
LYMPHOCYTES NFR BLD AUTO: 20 % (ref 14–44)
MCH RBC QN AUTO: 31.5 PG (ref 26.8–34.3)
MCHC RBC AUTO-ENTMCNC: 35.4 G/DL (ref 31.4–37.4)
MCV RBC AUTO: 89 FL (ref 82–98)
MONOCYTES # BLD AUTO: 0.66 THOUSAND/ÂΜL (ref 0.17–1.22)
MONOCYTES NFR BLD AUTO: 8 % (ref 4–12)
NEUTROPHILS # BLD AUTO: 5.52 THOUSANDS/ÂΜL (ref 1.85–7.62)
NEUTS SEG NFR BLD AUTO: 71 % (ref 43–75)
NRBC BLD AUTO-RTO: 0 /100 WBCS
PLATELET # BLD AUTO: 307 THOUSANDS/UL (ref 149–390)
PMV BLD AUTO: 10.5 FL (ref 8.9–12.7)
POTASSIUM SERPL-SCNC: 4 MMOL/L (ref 3.5–5.3)
PROT SERPL-MCNC: 7.5 G/DL (ref 6.4–8.4)
RBC # BLD AUTO: 4.38 MILLION/UL (ref 3.88–5.62)
SODIUM SERPL-SCNC: 134 MMOL/L (ref 135–147)
WBC # BLD AUTO: 7.89 THOUSAND/UL (ref 4.31–10.16)

## 2024-02-03 PROCEDURE — 36415 COLL VENOUS BLD VENIPUNCTURE: CPT

## 2024-02-03 PROCEDURE — 99291 CRITICAL CARE FIRST HOUR: CPT | Performed by: EMERGENCY MEDICINE

## 2024-02-03 PROCEDURE — 96365 THER/PROPH/DIAG IV INF INIT: CPT

## 2024-02-03 PROCEDURE — 99223 1ST HOSP IP/OBS HIGH 75: CPT | Performed by: SURGERY

## 2024-02-03 PROCEDURE — 74177 CT ABD & PELVIS W/CONTRAST: CPT

## 2024-02-03 PROCEDURE — 83690 ASSAY OF LIPASE: CPT

## 2024-02-03 PROCEDURE — 85025 COMPLETE CBC W/AUTO DIFF WBC: CPT

## 2024-02-03 PROCEDURE — 80053 COMPREHEN METABOLIC PANEL: CPT

## 2024-02-03 PROCEDURE — 76705 ECHO EXAM OF ABDOMEN: CPT

## 2024-02-03 PROCEDURE — 93005 ELECTROCARDIOGRAM TRACING: CPT

## 2024-02-03 PROCEDURE — 99285 EMERGENCY DEPT VISIT HI MDM: CPT

## 2024-02-03 PROCEDURE — G1004 CDSM NDSC: HCPCS

## 2024-02-03 PROCEDURE — 96372 THER/PROPH/DIAG INJ SC/IM: CPT

## 2024-02-03 PROCEDURE — 71046 X-RAY EXAM CHEST 2 VIEWS: CPT

## 2024-02-03 PROCEDURE — 84484 ASSAY OF TROPONIN QUANT: CPT

## 2024-02-03 RX ORDER — FAMOTIDINE 20 MG/1
20 TABLET, FILM COATED ORAL ONCE
Status: COMPLETED | OUTPATIENT
Start: 2024-02-03 | End: 2024-02-03

## 2024-02-03 RX ORDER — SUCRALFATE 1 G/1
1 TABLET ORAL ONCE
Status: COMPLETED | OUTPATIENT
Start: 2024-02-03 | End: 2024-02-03

## 2024-02-03 RX ORDER — ENOXAPARIN SODIUM 100 MG/ML
30 INJECTION SUBCUTANEOUS ONCE
Status: COMPLETED | OUTPATIENT
Start: 2024-02-03 | End: 2024-02-03

## 2024-02-03 RX ORDER — METRONIDAZOLE 500 MG/1
500 TABLET ORAL ONCE
Status: COMPLETED | OUTPATIENT
Start: 2024-02-03 | End: 2024-02-03

## 2024-02-03 RX ORDER — CEFAZOLIN SODIUM 2 G/50ML
2000 SOLUTION INTRAVENOUS ONCE
Status: COMPLETED | OUTPATIENT
Start: 2024-02-03 | End: 2024-02-03

## 2024-02-03 RX ORDER — ONDANSETRON 2 MG/ML
4 INJECTION INTRAMUSCULAR; INTRAVENOUS ONCE
Status: COMPLETED | OUTPATIENT
Start: 2024-02-03 | End: 2024-02-04

## 2024-02-03 RX ORDER — ONDANSETRON 2 MG/ML
4 INJECTION INTRAMUSCULAR; INTRAVENOUS EVERY 6 HOURS PRN
Status: DISCONTINUED | OUTPATIENT
Start: 2024-02-03 | End: 2024-02-04

## 2024-02-03 RX ORDER — SODIUM CHLORIDE, SODIUM GLUCONATE, SODIUM ACETATE, POTASSIUM CHLORIDE, MAGNESIUM CHLORIDE, SODIUM PHOSPHATE, DIBASIC, AND POTASSIUM PHOSPHATE .53; .5; .37; .037; .03; .012; .00082 G/100ML; G/100ML; G/100ML; G/100ML; G/100ML; G/100ML; G/100ML
75 INJECTION, SOLUTION INTRAVENOUS CONTINUOUS
Status: DISPENSED | OUTPATIENT
Start: 2024-02-03 | End: 2024-02-04

## 2024-02-03 RX ORDER — HEPARIN SODIUM 5000 [USP'U]/ML
5000 INJECTION, SOLUTION INTRAVENOUS; SUBCUTANEOUS EVERY 8 HOURS SCHEDULED
Status: DISCONTINUED | OUTPATIENT
Start: 2024-02-03 | End: 2024-02-05

## 2024-02-03 RX ADMIN — METRONIDAZOLE 500 MG: 500 TABLET ORAL at 10:28

## 2024-02-03 RX ADMIN — CEFAZOLIN SODIUM 2000 MG: 2 SOLUTION INTRAVENOUS at 09:56

## 2024-02-03 RX ADMIN — FAMOTIDINE 20 MG: 20 TABLET, FILM COATED ORAL at 08:06

## 2024-02-03 RX ADMIN — HEPARIN SODIUM 5000 UNITS: 5000 INJECTION INTRAVENOUS; SUBCUTANEOUS at 21:08

## 2024-02-03 RX ADMIN — SODIUM CHLORIDE, SODIUM GLUCONATE, SODIUM ACETATE, POTASSIUM CHLORIDE, MAGNESIUM CHLORIDE, SODIUM PHOSPHATE, DIBASIC, AND POTASSIUM PHOSPHATE 125 ML/HR: .53; .5; .37; .037; .03; .012; .00082 INJECTION, SOLUTION INTRAVENOUS at 16:25

## 2024-02-03 RX ADMIN — IOHEXOL 100 ML: 350 INJECTION, SOLUTION INTRAVENOUS at 08:59

## 2024-02-03 RX ADMIN — ENOXAPARIN SODIUM 30 MG: 30 INJECTION SUBCUTANEOUS at 08:06

## 2024-02-03 RX ADMIN — SUCRALFATE 1 G: 1 TABLET ORAL at 08:06

## 2024-02-03 NOTE — LETTER
[facility]  [address] [facility] [address]  No information on file.    February 5, 2024     Patient: Cricket Reyes   YOB: 1975   Date of Visit: 2/3/2024       To Whom it May Concern:    Cricket Reyes is under my professional care. He was seen in the hospital from 2/3/2024 to 02/05/24. He may return to work on 2/19/2024 without limitations.    If you have any questions or concerns, please don't hesitate to call.         Sincerely,          Terry Cunningham MD

## 2024-02-03 NOTE — ED ATTENDING ATTESTATION
Final Diagnoses:     1. Acute cholecystitis      ED Course as of 02/04/24 0719   Sat Feb 03, 2024   0821 WBC: 7.89   0821 Hemoglobin: 13.8   0821 Platelet Count: 307   0841 hs TnI 0hr: 42  Was cardioverted a week ago.    0928 Awaiting formal read.  CT suggestive of cholecystitis?     I, Cuba Chau MD, saw and evaluated the patient. All available labs and X-rays were ordered by me or the resident / non-physician and have been reviewed by myself. I discussed the patient with the resident / non-physician and agree with the resident's / non-physician practitioner's findings and plan as documented in the resident's / non-physician practicitioner's note, except where noted.   At this point, I agree with the current assessment done in the ED.   I was present during key portions of all procedures performed unless otherwise stated.     HPI:  NURSING TRIAGE:    This is a 48 y.o. male presenting for evaluation of belly pain.  He was here last week and was found to have discomfort ? had new onset AFlutter. He was cardioverted.   He is supposed to get Xarleto, but hasn't since the one dose in the emergency room.  Presenting for belly pain, epigastric going to the LEFT upper back going to chest kind of.  +nausea  +chills  No vomiting.   Felt sick last week some time and feels it's getting worse.  No palpitations  This feels very similar to what brought on the AFlutter so came in to make sure he doesn't.   He is NOT on a beta-blocker.   Hx of similar 2 years ago.   No blood in stool  No dark bowel movements.  No pain/swelling.    PMH:  No personal hx of reflux.    Chief Complaint   Patient presents with    Abdominal Pain     Pt reports abd pain that started at 4am. Pt reports last time these symptoms happened had to call EMS and came her with SOB,N/V, and abd pain. Pt reports unknown why this started and has echo scheduled for 2/13.      PHYSICAL: ASSESSMENT + PLAN:   General: VS reviewed  Appears in NAD  awake, alert.    Well-nourished, well-developed. Appears stated age.   Speaking normally in full sentences.   Head: Normocephalic, atraumatic  Eyes: EOM-I. No diplopia.   No hyphema.   No subconjunctival hemorrhages.  Symmetrical lids.   ENT: Atraumatic external nose and ears.    MMM  No malocclusion. No stridor. Normal phonation. No drooling. Normal swallowing.   Neck: No JVD.  CV: No pallor noted  HR 60s  Not markedly tachycardic.   Lungs:   No tachypnea  No respiratory distress  Abd: soft diffuse mild belly tenderness  nd   no rebound/guarding  MSK:   FROM spontaneously  Skin: Dry, intact.   Neuro: Awake, alert, GCS15, CN II-XII grossly intact.   Motor grossly intact.  Psychiatric/Behavioral: interacting normally; appropriate mood/affect.    Exam: deferred    Vitals:    02/03/24 1415 02/03/24 1621 02/03/24 1653 02/03/24 2200   BP:  142/66 143/66 133/93   BP Location:   Left arm Right arm   Pulse: 69 62 73 59   Resp: 17 19 18 18   Temp:   98.1 °F (36.7 °C) 97.9 °F (36.6 °C)   TempSrc:    Oral   SpO2: 98% 98%     Weight:       Height:        EKG given concerns.  - given the presentation, will check CBC for marked leukocytosis  - CMP for liver enzyme elevation that could signal cholecystitis, biliary obstructive disease. Check RFTs for JULIOCESAR / markers of dehydration.  - Lipase given abdominal pain to evaluate specifically for pancreatitis.  - Given age, will do EKG/Troponin as perhaps an atypical presentation of ACS.  HEART score:  History 0=Slightly or non-suspicious   ECG 2=Significant ST-depression   Age 1= > 45 - <65 years   Risk Factors 1= 1 or 2 risk factors   Troponin 2=Greater than or equal to 36 ng/L   Total 6   - Lastly, will consider abdominal imaging.  - CT AP w Contrast: r/o appendicitis, cholecystitis, bowel obstruction or other acute abdominal pathology. Would also demonstrate signs of pyelonephritis, cystitis.   - Disposition per workup.        There are no obvious limitations to social determinants of care.    Nursing note reviewed.   Vitals reviewed.   Orders placed by myself and/or advanced practitioner / resident.    Previous chart was reviewed  No language barrier.   History obtained from patient.    There are no limitations to the history obtained:  Critical Care Time:   - Critical care time: 34 minutes  - Critical care time was exclusive of seperately bilable procedures and treating other patients as well as teaching time.   - Critical care was necessary to treat or prevent imminent or life-threatening deterioration of the following condition: cholecystitis  - Critical care time was spent personally by me on the following activities as well as the above as per the ED course and rest of chart: blood draw for specimens, obtaining history from patient / surrogate, developement of a treatment plan, discussions with consultants, evaluation of patient's response to the treatment, examination of the patient, ordering/performing treatements and interventions, re-evaluation of the patient's condition, review of old charts, ordering/reviewing laboratory studies, and ordering/reviewing of radiographic studies   Past Medical: Past Surgical:    has a past medical history of Asthma and Heart murmur.  has a past surgical history that includes Arthroscopic repair ACL (Right) and Hernia repair.   Social: Cardiac (Echo/Cath)   Social History     Substance and Sexual Activity   Alcohol Use Yes    Comment: social     Social History     Tobacco Use   Smoking Status Never    Passive exposure: Never   Smokeless Tobacco Never     Social History     Substance and Sexual Activity   Drug Use Not Currently    No results found for this or any previous visit.    No results found for this or any previous visit.    No results found for this or any previous visit.     Labs: Imaging:   Labs Reviewed   COMPREHENSIVE METABOLIC PANEL - Abnormal       Result Value Ref Range Status    Sodium 134 (*) 135 - 147 mmol/L Final    Potassium 4.0  3.5 - 5.3  "mmol/L Final    Chloride 103  96 - 108 mmol/L Final    CO2 22  21 - 32 mmol/L Final    ANION GAP 9  mmol/L Final    BUN 15  5 - 25 mg/dL Final    Creatinine 1.07  0.60 - 1.30 mg/dL Final    Comment: Standardized to IDMS reference method    Glucose 114  65 - 140 mg/dL Final    Comment: If the patient is fasting, the ADA then defines impaired fasting glucose as > 100 mg/dL and diabetes as > or equal to 123 mg/dL.    Calcium 8.7  8.4 - 10.2 mg/dL Final    AST 20  13 - 39 U/L Final    ALT 32  7 - 52 U/L Final    Comment: Specimen collection should occur prior to Sulfasalazine administration due to the potential for falsely depressed results.     Alkaline Phosphatase 52  34 - 104 U/L Final    Total Protein 7.5  6.4 - 8.4 g/dL Final    Albumin 4.2  3.5 - 5.0 g/dL Final    Total Bilirubin 0.52  0.20 - 1.00 mg/dL Final    Comment: Use of this assay is not recommended for patients undergoing treatment with eltrombopag due to the potential for falsely elevated results.  N-acetyl-p-benzoquinone imine (metabolite of Acetaminophen) will generate erroneously low results in samples for patients that have taken an overdose of Acetaminophen.    eGFR 81  ml/min/1.73sq m Final    Narrative:     National Kid[employer] Disease Foundation guidelines for Chronic Kidney Disease (CKD):     Stage 1 with normal or high GFR (GFR > 90 mL/min/1.73 square meters)    Stage 2 Mild CKD (GFR = 60-89 mL/min/1.73 square meters)    Stage 3A Moderate CKD (GFR = 45-59 mL/min/1.73 square meters)    Stage 3B Moderate CKD (GFR = 30-44 mL/min/1.73 square meters)    Stage 4 Severe CKD (GFR = 15-29 mL/min/1.73 square meters)    Stage 5 End Stage CKD (GFR <15 mL/min/1.73 square meters)  Note: GFR calculation is accurate only with a steady state creatinine   LIPASE - Normal    Lipase 33  11 - 82 u/L Final   HS TROPONIN I 0HR - Normal    hs TnI 0hr 42  \"Refer to ACS Flowchart\"- see link ng/L Final    Comment:                                              Initial (time 0) " "result  If >=50 ng/L, Myocardial injury suggested ;  Type of myocardial injury and treatment strategy  to be determined.  If 5-49 ng/L, a delta result at 2 hours and or 4 hours will be needed to further evaluate.  If <4 ng/L, and chest pain has been >3 hours since onset, patient may qualify for discharge based on the HEART score in the ED.  If <5 ng/L and <3hours since onset of chest pain, a delta result at 2 hours will be needed to further evaluate.    HS Troponin 99th Percentile URL of a Health Population=12 ng/L with a 95% Confidence Interval of 8-18 ng/L.    Second Troponin (time 2 hours)  If calculated delta >= 20 ng/L,  Myocardial injury suggested ; Type of myocardial injury and treatment strategy to be determined.  If 5-49 ng/L and the calculated delta is 5-19 ng/L, consult medical service for evaluation.  Continue evaluation for ischemia on ecg and other possible etiology and repeat hs troponin at 4 hours.  If delta is <5 ng/L at 2 hours, consider discharge based on risk stratification via the HEART score (if in ED), or LAYNE risk score in IP/Observation.    HS Troponin 99th Percentile URL of a Health Population=12 ng/L with a 95% Confidence Interval of 8-18 ng/L.   HS TROPONIN I 2HR - Normal    hs TnI 2hr 45  \"Refer to ACS Flowchart\"- see link ng/L Final    Comment:                                              Initial (time 0) result  If >=50 ng/L, Myocardial injury suggested ;  Type of myocardial injury and treatment strategy  to be determined.  If 5-49 ng/L, a delta result at 2 hours and or 4 hours will be needed to further evaluate.  If <4 ng/L, and chest pain has been >3 hours since onset, patient may qualify for discharge based on the HEART score in the ED.  If <5 ng/L and <3hours since onset of chest pain, a delta result at 2 hours will be needed to further evaluate.    HS Troponin 99th Percentile URL of a Health Population=12 ng/L with a 95% Confidence Interval of 8-18 ng/L.    Second Troponin (time 2 " hours)  If calculated delta >= 20 ng/L,  Myocardial injury suggested ; Type of myocardial injury and treatment strategy to be determined.  If 5-49 ng/L and the calculated delta is 5-19 ng/L, consult medical service for evaluation.  Continue evaluation for ischemia on ecg and other possible etiology and repeat hs troponin at 4 hours.  If delta is <5 ng/L at 2 hours, consider discharge based on risk stratification via the HEART score (if in ED), or LAYNE risk score in IP/Observation.    HS Troponin 99th Percentile URL of a Health Population=12 ng/L with a 95% Confidence Interval of 8-18 ng/L.    Delta 2hr hsTnI 3  <20 ng/L Final   CBC AND DIFFERENTIAL    WBC 7.89  4.31 - 10.16 Thousand/uL Final    RBC 4.38  3.88 - 5.62 Million/uL Final    Hemoglobin 13.8  12.0 - 17.0 g/dL Final    Hematocrit 39.0  36.5 - 49.3 % Final    MCV 89  82 - 98 fL Final    MCH 31.5  26.8 - 34.3 pg Final    MCHC 35.4  31.4 - 37.4 g/dL Final    RDW 12.9  11.6 - 15.1 % Final    MPV 10.5  8.9 - 12.7 fL Final    Platelets 307  149 - 390 Thousands/uL Final    nRBC 0  /100 WBCs Final    Neutrophils Relative 71  43 - 75 % Final    Immat GRANS % 0  0 - 2 % Final    Lymphocytes Relative 20  14 - 44 % Final    Monocytes Relative 8  4 - 12 % Final    Eosinophils Relative 1  0 - 6 % Final    Basophils Relative 0  0 - 1 % Final    Neutrophils Absolute 5.52  1.85 - 7.62 Thousands/µL Final    Immature Grans Absolute 0.02  0.00 - 0.20 Thousand/uL Final    Lymphocytes Absolute 1.56  0.60 - 4.47 Thousands/µL Final    Monocytes Absolute 0.66  0.17 - 1.22 Thousand/µL Final    Eosinophils Absolute 0.11  0.00 - 0.61 Thousand/µL Final    Basophils Absolute 0.02  0.00 - 0.10 Thousands/µL Final   PLATELET COUNT    US right upper quadrant   Final Result      Gallbladder wall thickening with stones and pericholecystic fluid. Findings are suspicious for cholecystitis. This could be acute or chronic although haziness of the surrounding fat on CT favors acute.. This should  be correlated with the acuity of the    symptomatology.      Of note, a sonographic Truong's was not elicited and the white blood cell count does appear normal. Hepatobiliary study may be useful in differentiating these possibilities.      The study was marked in EPIC for immediate notification.      Workstation performed: [id number]         CT abdomen pelvis with contrast   Final Result      Cholelithiasis with associated wall thickening and mild pericholecystic fluid concerning for acute cholecystitis. Ultrasound correlation can be considered for further assessment.      New hypodense lesion involving the inferior pancreatic head and uncinate process compared to the prior 7/6/2009 examination. MRI of the abdomen with contrast is recommended on a nonemergent basis.      Mild to moderate bladder wall thickening likely in part related to under distention. Correlate with clinical and laboratory parameters to exclude cystitis.         I personally discussed this study with RUDOLPH FLORES on 2/3/2024 9:29 AM.                              Workstation performed: [id number]         XR chest 2 views   ED Interpretation   No acute cardiopulmonary disease on my interpretation         Medications: Code Status:   Medications   ondansetron (ZOFRAN) injection 4 mg (0 mg Intravenous Hold 2/3/24 0815)   multi-electrolyte (PLASMALYTE-A/ISOLYTE-S PH 7.4) IV solution (125 mL/hr Intravenous New Bag 2/4/24 0537)   ondansetron (ZOFRAN) injection 4 mg (has no administration in time range)   heparin (porcine) subcutaneous injection 5,000 Units (5,000 Units Subcutaneous Given 2/4/24 0537)   magnesium gluconate (MAGONATE) tablet 500 mg (has no administration in time range)   sucralfate (CARAFATE) tablet 1 g (1 g Oral Given 2/3/24 0806)   famotidine (PEPCID) tablet 20 mg (20 mg Oral Given 2/3/24 0806)   enoxaparin (LOVENOX) subcutaneous injection 30 mg (30 mg Subcutaneous Given 2/3/24 0806)   iohexol (OMNIPAQUE) 350 MG/ML injection  (MULTI-DOSE) 100 mL (100 mL Intravenous Given 2/3/24 0859)   ceFAZolin (ANCEF) IVPB (premix in dextrose) 2,000 mg 50 mL (0 mg Intravenous Stopped 2/3/24 1026)   metroNIDAZOLE (FLAGYL) tablet 500 mg (500 mg Oral Given 2/3/24 1028)    Code Status: Level 1 - Full Code  Advance Directive and Living Will:      Power of :    POLST:       Orders Placed This Encounter   Procedures    POC Biliary US    XR chest 2 views    CT abdomen pelvis with contrast    US right upper quadrant    CBC and differential    CMP    Lipase    HS Troponin 0hr (reflex protocol)    HS Troponin I 2hr    Platelet count    CBC and differential    Comprehensive metabolic panel    Magnesium    Phosphorus    Diet NPO; Sips with meds    Insert peripheral IV    Vital Signs per unit routine    Up and OOB as tolerated    I/O    Insert peripheral IV    Maintain IV access    Apply SCD or Foot pumps    Level 1-Full Code: all life saving measures are indicated    Inpatient consult to Case Management    ECG 12 lead    ECG 12 lead    Inpatient Admission     Time reflects when diagnosis was documented in both MDM as applicable and the Disposition within this note       Time User Action Codes Description Comment    2/3/2024  9:55 AM Pedro Lazaro Add [K81.0] Acute cholecystitis           ED Disposition       ED Disposition   Admit    Condition   Stable    Date/Time   Sat Feb 3, 2024 11:12 AM    Comment   Case was discussed with surgery and the patient's admission status was agreed to be Admission Status: inpatient status to the service of   .               Follow-up Information    None       Current Discharge Medication List        CONTINUE these medications which have NOT CHANGED    Details   magnesium gluconate (MAGONATE) 500 mg tablet Take 1 tablet (500 mg total) by mouth in the morning  Qty: 30 tablet, Refills: 3    Associated Diagnoses: Hypomagnesemia      rivaroxaban (XARELTO) 20 mg tablet Take 1 tablet (20 mg total) by mouth daily with  "breakfast  Qty: 90 tablet, Refills: 3    Associated Diagnoses: Atrial flutter, unspecified type (HCC)           No discharge procedures on file.  Prior to Admission Medications   Prescriptions Last Dose Informant Patient Reported? Taking?   magnesium gluconate (MAGONATE) 500 mg tablet   No No   Sig: Take 1 tablet (500 mg total) by mouth in the morning   rivaroxaban (XARELTO) 20 mg tablet   No No   Sig: Take 1 tablet (20 mg total) by mouth daily with breakfast      Facility-Administered Medications: None                        Portions of the record may have been created with voice recognition software. Occasional wrong word or \"sound a like\" substitutions may have occurred due to the inherent limitations of voice recognition software. Read the chart carefully and recognize, using context, where substitutions have occurred.    Electronically signed by:  Cuba Chau  "

## 2024-02-03 NOTE — ED NOTES
Patient states this is similar to the pain that brought him to the ED last week.Unknown cause.      Brooklyn Celestin RN  02/03/24 0709

## 2024-02-03 NOTE — ED PROVIDER NOTES
History  Chief Complaint   Patient presents with    Abdominal Pain     Pt reports abd pain that started at 4am. Pt reports last time these symptoms happened had to call EMS and came her with SOB,N/V, and abd pain. Pt reports unknown why this started and has echo scheduled for 2/13.     Patient is a 48-year-old male with a significant past medical history of atrial fibrillation, with recent ED evaluation and subsequent cardioversion on 1/29/2024, presenting for evaluation of abdominal pain.  He reports that prior to his symptoms that brought him into the emergency department on 1/29/2024, he was experiencing some upper abdominal pain with some radiation into his chest, accompanied by some intermittent nausea.  He states that this progressed to shortness of breath, and was subsequently found to have rapid atrial flutter which was cardioverted.  He states that his shortness of breath improved, as did his abdominal pain, however his abdominal pain is since come back.  He does not associate it with anything in particular.  He says that it occasionally radiates to his left shoulder and his back.  He denies fevers.  He denies any urinary changes or changes in bowel movements, specifically denying any bloody or melanotic stools.  He has not yet been compliant with his Xarelto due to insurance issues, but has set to  as an outpatient.  He denies any shortness of breath at this time.  He says that the pain is predominantly in his abdomen but does occasionally cause him some chest pain.  He has tried Tylenol for his symptoms with minimal relief.        Prior to Admission Medications   Prescriptions Last Dose Informant Patient Reported? Taking?   magnesium gluconate (MAGONATE) 500 mg tablet   No No   Sig: Take 1 tablet (500 mg total) by mouth in the morning   rivaroxaban (XARELTO) 20 mg tablet   No No   Sig: Take 1 tablet (20 mg total) by mouth daily with breakfast      Facility-Administered Medications: None       Past  Medical History:   Diagnosis Date    Asthma     Heart murmur        Past Surgical History:   Procedure Laterality Date    ARTHROSCOPIC REPAIR ACL Right     HERNIA REPAIR         Family History   Problem Relation Age of Onset    Arthritis Father     LUDA disease Father     Cancer Sister     Epididymitis Daughter     Diabetes Paternal Aunt     Diabetes Paternal Uncle      I have reviewed and agree with the history as documented.    E-Cigarette/Vaping    E-Cigarette Use Never User      E-Cigarette/Vaping Substances    Nicotine No     THC No     CBD No     Flavoring No     Other No     Unknown No      Social History     Tobacco Use    Smoking status: Never     Passive exposure: Never    Smokeless tobacco: Never   Vaping Use    Vaping status: Never Used   Substance Use Topics    Alcohol use: Yes     Comment: social    Drug use: Not Currently        Review of Systems   Respiratory:  Negative for shortness of breath.    Cardiovascular:  Positive for chest pain.   Gastrointestinal:  Positive for abdominal pain and nausea. Negative for diarrhea and vomiting.   All other systems reviewed and are negative.      Physical Exam  ED Triage Vitals [02/03/24 0701]   Temperature Pulse Respirations Blood Pressure SpO2   (!) 97.3 °F (36.3 °C) 64 18 170/76 100 %      Temp Source Heart Rate Source Patient Position - Orthostatic VS BP Location FiO2 (%)   Oral Monitor Sitting Left arm --      Pain Score       9             Orthostatic Vital Signs  Vitals:    02/03/24 1415 02/03/24 1621 02/03/24 1653 02/03/24 2200   BP:  142/66 143/66 133/93   Pulse: 69 62 73 59   Patient Position - Orthostatic VS:   Sitting Sitting       Physical Exam  Vitals and nursing note reviewed.   Constitutional:       General: He is not in acute distress.     Appearance: Normal appearance. He is not ill-appearing or toxic-appearing.   HENT:      Head: Normocephalic and atraumatic.      Right Ear: External ear normal.      Left Ear: External ear normal.      Nose:  Nose normal.   Eyes:      General: No scleral icterus.        Right eye: No discharge.         Left eye: No discharge.      Extraocular Movements: Extraocular movements intact.      Conjunctiva/sclera: Conjunctivae normal.   Cardiovascular:      Rate and Rhythm: Normal rate.      Heart sounds: Normal heart sounds. No murmur heard.     No friction rub. No gallop.   Pulmonary:      Effort: Pulmonary effort is normal. No respiratory distress.      Breath sounds: Normal breath sounds.   Abdominal:      General: Abdomen is flat. There is no distension.      Palpations: Abdomen is soft. There is no mass.      Tenderness: There is abdominal tenderness in the right upper quadrant and epigastric area. There is no guarding. Positive signs include Truong's sign.   Genitourinary:     Comments: Deferred  Skin:     General: Skin is warm and dry.   Neurological:      General: No focal deficit present.      Mental Status: He is alert.         ED Medications  Medications   ondansetron (ZOFRAN) injection 4 mg (0 mg Intravenous Hold 2/3/24 0815)   multi-electrolyte (PLASMALYTE-A/ISOLYTE-S PH 7.4) IV solution (125 mL/hr Intravenous New Bag 2/4/24 0537)   ondansetron (ZOFRAN) injection 4 mg (has no administration in time range)   heparin (porcine) subcutaneous injection 5,000 Units (5,000 Units Subcutaneous Given 2/4/24 0537)   magnesium gluconate (MAGONATE) tablet 500 mg (has no administration in time range)   sucralfate (CARAFATE) tablet 1 g (1 g Oral Given 2/3/24 0806)   famotidine (PEPCID) tablet 20 mg (20 mg Oral Given 2/3/24 0806)   enoxaparin (LOVENOX) subcutaneous injection 30 mg (30 mg Subcutaneous Given 2/3/24 0806)   iohexol (OMNIPAQUE) 350 MG/ML injection (MULTI-DOSE) 100 mL (100 mL Intravenous Given 2/3/24 0859)   ceFAZolin (ANCEF) IVPB (premix in dextrose) 2,000 mg 50 mL (0 mg Intravenous Stopped 2/3/24 1026)   metroNIDAZOLE (FLAGYL) tablet 500 mg (500 mg Oral Given 2/3/24 1028)       Diagnostic Studies  Results Reviewed        Procedure Component Value Units Date/Time    Comprehensive metabolic panel [[id number]] Collected: 02/04/24 0533    Lab Status: Final result Specimen: Blood from Hand, Right Updated: 02/04/24 0606     Sodium 138 mmol/L      Potassium 4.0 mmol/L      Chloride 107 mmol/L      CO2 22 mmol/L      ANION GAP 9 mmol/L      BUN 9 mg/dL      Creatinine 1.02 mg/dL      Glucose 98 mg/dL      Calcium 8.5 mg/dL      AST 25 U/L      ALT 32 U/L      Alkaline Phosphatase 52 U/L      Total Protein 6.8 g/dL      Albumin 3.9 g/dL      Total Bilirubin 0.90 mg/dL      eGFR 85 ml/min/1.73sq m     Narrative:      National Kid[employer] Foundation guidelines for Chronic Kidney Disease (CKD):     Stage 1 with normal or high GFR (GFR > 90 mL/min/1.73 square meters)    Stage 2 Mild CKD (GFR = 60-89 mL/min/1.73 square meters)    Stage 3A Moderate CKD (GFR = 45-59 mL/min/1.73 square meters)    Stage 3B Moderate CKD (GFR = 30-44 mL/min/1.73 square meters)    Stage 4 Severe CKD (GFR = 15-29 mL/min/1.73 square meters)    Stage 5 End Stage CKD (GFR <15 mL/min/1.73 square meters)  Note: GFR calculation is accurate only with a steady state creatinine    Magnesium [[id number]]  (Normal) Collected: 02/04/24 0533    Lab Status: Final result Specimen: Blood from Hand, Right Updated: 02/04/24 0606     Magnesium 2.2 mg/dL     Phosphorus [[id number]]  (Normal) Collected: 02/04/24 0533    Lab Status: Final result Specimen: Blood from Hand, Right Updated: 02/04/24 0606     Phosphorus 2.9 mg/dL     CBC and differential [[id number]] Collected: 02/04/24 0533    Lab Status: Final result Specimen: Blood from Hand, Right Updated: 02/04/24 0542     WBC 6.33 Thousand/uL      RBC 4.15 Million/uL      Hemoglobin 13.2 g/dL      Hematocrit 37.6 %      MCV 91 fL      MCH 31.8 pg      MCHC 35.1 g/dL      RDW 13.0 %      MPV 10.0 fL      Platelets 277 Thousands/uL      nRBC 0 /100 WBCs      Neutrophils Relative 55 %      Immat GRANS % 0 %      Lymphocytes Relative 32 %       Monocytes Relative 10 %      Eosinophils Relative 3 %      Basophils Relative 0 %      Neutrophils Absolute 3.52 Thousands/µL      Immature Grans Absolute 0.02 Thousand/uL      Lymphocytes Absolute 2.00 Thousands/µL      Monocytes Absolute 0.60 Thousand/µL      Eosinophils Absolute 0.17 Thousand/µL      Basophils Absolute 0.02 Thousands/µL     Platelet count [[id number]]     Lab Status: No result Specimen: Blood     HS Troponin I 2hr [[id number]]  (Normal) Collected: 02/03/24 1044    Lab Status: Final result Specimen: Blood from Arm, Right Updated: 02/03/24 1140     hs TnI 2hr 45 ng/L      Delta 2hr hsTnI 3 ng/L     CMP [[id number]]  (Abnormal) Collected: 02/03/24 0801    Lab Status: Final result Specimen: Blood from Arm, Right Updated: 02/03/24 0840     Sodium 134 mmol/L      Potassium 4.0 mmol/L      Chloride 103 mmol/L      CO2 22 mmol/L      ANION GAP 9 mmol/L      BUN 15 mg/dL      Creatinine 1.07 mg/dL      Glucose 114 mg/dL      Calcium 8.7 mg/dL      AST 20 U/L      ALT 32 U/L      Alkaline Phosphatase 52 U/L      Total Protein 7.5 g/dL      Albumin 4.2 g/dL      Total Bilirubin 0.52 mg/dL      eGFR 81 ml/min/1.73sq m     Narrative:      National Kidney Disease Foundation guidelines for Chronic Kidney Disease (CKD):     Stage 1 with normal or high GFR (GFR > 90 mL/min/1.73 square meters)    Stage 2 Mild CKD (GFR = 60-89 mL/min/1.73 square meters)    Stage 3A Moderate CKD (GFR = 45-59 mL/min/1.73 square meters)    Stage 3B Moderate CKD (GFR = 30-44 mL/min/1.73 square meters)    Stage 4 Severe CKD (GFR = 15-29 mL/min/1.73 square meters)    Stage 5 End Stage CKD (GFR <15 mL/min/1.73 square meters)  Note: GFR calculation is accurate only with a steady state creatinine    Lipase [[id number]]  (Normal) Collected: 02/03/24 0801    Lab Status: Final result Specimen: Blood from Arm, Right Updated: 02/03/24 0840     Lipase 33 u/L     HS Troponin 0hr (reflex protocol) [[id number]]  (Normal) Collected: 02/03/24 0801     Lab Status: Final result Specimen: Blood from Arm, Right Updated: 02/03/24 0837     hs TnI 0hr 42 ng/L     CBC and differential [[id number]] Collected: 02/03/24 0801    Lab Status: Final result Specimen: Blood from Arm, Right Updated: 02/03/24 0810     WBC 7.89 Thousand/uL      RBC 4.38 Million/uL      Hemoglobin 13.8 g/dL      Hematocrit 39.0 %      MCV 89 fL      MCH 31.5 pg      MCHC 35.4 g/dL      RDW 12.9 %      MPV 10.5 fL      Platelets 307 Thousands/uL      nRBC 0 /100 WBCs      Neutrophils Relative 71 %      Immat GRANS % 0 %      Lymphocytes Relative 20 %      Monocytes Relative 8 %      Eosinophils Relative 1 %      Basophils Relative 0 %      Neutrophils Absolute 5.52 Thousands/µL      Immature Grans Absolute 0.02 Thousand/uL      Lymphocytes Absolute 1.56 Thousands/µL      Monocytes Absolute 0.66 Thousand/µL      Eosinophils Absolute 0.11 Thousand/µL      Basophils Absolute 0.02 Thousands/µL                    US right upper quadrant   Final Result by Sarthak Salazar MD (02/03 1821)      Gallbladder wall thickening with stones and pericholecystic fluid. Findings are suspicious for cholecystitis. This could be acute or chronic although haziness of the surrounding fat on CT favors acute.. This should be correlated with the acuity of the    symptomatology.      Of note, a sonographic Truong's was not elicited and the white blood cell count does appear normal. Hepatobiliary study may be useful in differentiating these possibilities.      The study was marked in EPIC for immediate notification.      Workstation performed: [id number]         CT abdomen pelvis with contrast   Final Result by Dennis Noonan MD (02/03 0930)      Cholelithiasis with associated wall thickening and mild pericholecystic fluid concerning for acute cholecystitis. Ultrasound correlation can be considered for further assessment.      New hypodense lesion involving the inferior pancreatic head and uncinate process compared to the  prior 7/6/2009 examination. MRI of the abdomen with contrast is recommended on a nonemergent basis.      Mild to moderate bladder wall thickening likely in part related to under distention. Correlate with clinical and laboratory parameters to exclude cystitis.         I personally discussed this study with RUDOLPH LAZARO on 2/3/2024 9:29 AM.                              Workstation performed: [id number]         XR chest 2 views   ED Interpretation by Rudolph Lazaro DO (02/03 0855)   No acute cardiopulmonary disease on my interpretation            Procedures  Procedures      ED Course  ED Course as of 02/04/24 0640   Sat Feb 03, 2024   0927 Procedure Note: EKG  Date/Time: 02/03/24 9:27 AM   Interpreted by: Rudolph Lazaro   Indications / Diagnosis: abdominal pain  ECG reviewed by me, the ED Provider: yes   The EKG demonstrates:  Rhythm: sinus bradycardia  Intervals: normal intervals  Axis: right axis  QRS/Blocks: wide QRS similar to previous  ST Changes: t wave inversions in III and precordial leads, mild elevation in I and aVL   1212 Delta 2hr hsTnI: 3                                       Medical Decision Making  Patient with history as above presented with abdominal pain. History obtained from patient.    Differential diagnosis includes: GERD, gastritis, cholecystitis, pancreatitis, arrhythmia, ACS    Plan: CBC, CMP, lipase, troponin, ECG, CT abdomen pelvis    Reviewed external records. ECG independently interpreted by myself as above notable for a normal sinus rhythm with T wave inversions in 3 and precordial leads. Labs reviewed and unremarkable. Independently reviewed imaging consistent with acute cholecystitis.  Also notable for a pancreatic hypodensity for which MRI is recommended on a nonemergent basis.  These findings were discussed with the patient in full.  Patient initiated on Ancef and metronidazole.  I discussed the patient's case with general surgery who agreed to accept the patient for  admission onto their service.    Amount and/or Complexity of Data Reviewed  Labs: ordered. Decision-making details documented in ED Course.  Radiology: ordered and independent interpretation performed.    Risk  Prescription drug management.  Decision regarding hospitalization.          Disposition  Final diagnoses:   Acute cholecystitis     Time reflects when diagnosis was documented in both MDM as applicable and the Disposition within this note       Time User Action Codes Description Comment    2/3/2024  9:55 AM Pedro Lazaro Add [K81.0] Acute cholecystitis           ED Disposition       ED Disposition   Admit    Condition   Stable    Date/Time   Sat Feb 3, 2024 11:12 AM    Comment   Case was discussed with surgery and the patient's admission status was agreed to be Admission Status: inpatient status to the service of   .               Follow-up Information    None         Current Discharge Medication List        CONTINUE these medications which have NOT CHANGED    Details   magnesium gluconate (MAGONATE) 500 mg tablet Take 1 tablet (500 mg total) by mouth in the morning  Qty: 30 tablet, Refills: 3    Associated Diagnoses: Hypomagnesemia      rivaroxaban (XARELTO) 20 mg tablet Take 1 tablet (20 mg total) by mouth daily with breakfast  Qty: 90 tablet, Refills: 3    Associated Diagnoses: Atrial flutter, unspecified type (HCC)           No discharge procedures on file.    PDMP Review       None             ED Provider  Attending physically available and evaluated Cricket Reyes. I managed the patient along with the ED Attending.    Electronically Signed by           Pedro Lazaro DO  02/04/24 0734

## 2024-02-03 NOTE — ED PROCEDURE NOTE
Procedure  POC Biliary US    Date/Time: 2/3/2024 10:00 AM    Performed by: Robert Whitaker DO  Authorized by: Robert Whitaker DO    Patient location:  ED  Performed by:  Resident  Other Assisting Provider: No    Procedure details:     Exam Type:  Educational and diagnostic    Indications: epigastric pain and nausea      Assessment for:  Cholecystitis and cholelithiasis    Views obtained: gallbladder (transverse and longitudinal) and liver      Image quality: limited diagnostic      Image availability:  Images available in PACS, video obtained and still images obtained  Findings:     Cholelithiasis: identified      Gallbladder wall:  Abnormal  Interpretation:     Biliary ultrasound impressions: cholecystitis                     Robert Whitaker DO  02/03/24 1001

## 2024-02-03 NOTE — H&P
Acute Care Surgery  History and Physical  Cricket Reyes 48 y.o. male MRN: [id number]  Unit/Bed#: [facility] Encounter: [id number]    Assessment:  47 yo male present with abdominal pain. CT  scan shows Cholelithiasis with associated wall thickening and mild pericholecystic fluid.  Afebrile hemodynamically stable, relatively benign exam.  Imaging equivocal for acute cholecystitis.    97.3F, HR 61-69, /76, % on room air  WBC 7.89  Hb 13.8  Plt 307  Cr 1.07    Plan:  -Admit to surgery  -Right upper quadrant ultrasound to rule out cholecystitis  -If positive RUQ US, Cardiology consult for cardiac risk assessment  -NPO  -IV fluids  -Pain/nausea PRN      History of Present Illness   HPI:  Cricket Reyes is a 48 y.o. male who presents with abdominal pain.  Patient reports at 4 AM started having pain in epigastrium that radiates across all quadrants of his abdomen.  Patient reports severity of 9/10.  Patient for started experiencing symptoms like this 2 years ago which would resolve on its own.  Last week patient had an episode similar after eating chicken.  Patient took aspirin and Tums and pain resolved temporarily and once it came back patient was concerned went to the emergency department.  In the emergency department patient went into atrial flutter and was unstable and had to be cardioverted.  Patient has history of heart murmur since 11 years old that he believes is aortic stenosis.  Patient currently not complaining of nausea, no fevers chills chest pain or shortness of breath.  Patient denies any diarrhea.  Patient ate frozen pizza last night, and that was his last meal.  Patient received 1 dose of Eliquis on Monday and is soon to start Xarelto.  Patient denies allergies.  Patient surgical history of inguinal hernia repair when he was a child and right ACL repair.    Review of Systems   Constitutional:  Negative for chills and fever.   Respiratory:  Negative for shortness of breath.    Cardiovascular:  Negative  "for chest pain.   Gastrointestinal:  Positive for abdominal pain. Negative for diarrhea, nausea and vomiting.       Historical Information   Past Medical History:   Diagnosis Date    Asthma     Heart murmur      Past Surgical History:   Procedure Laterality Date    ARTHROSCOPIC REPAIR ACL Right     HERNIA REPAIR       Social History   Social History     Substance and Sexual Activity   Alcohol Use Yes    Comment: social     Social History     Substance and Sexual Activity   Drug Use Not Currently     Social History     Tobacco Use   Smoking Status Never    Passive exposure: Never   Smokeless Tobacco Never     Family History: non-contributory    Meds/Allergies   all medications and allergies reviewed  No Known Allergies    Objective   First Vitals:   Blood Pressure: 170/76 (02/03/24 0701)  Pulse: 64 (02/03/24 0701)  Temperature: (!) 97.3 °F (36.3 °C) (02/03/24 0701)  Temp Source: Oral (02/03/24 0701)  Respirations: 18 (02/03/24 0701)  Height: 5' 4\" (162.6 cm) (02/03/24 0701)  Weight - Scale: 96.2 kg (212 lb) (02/03/24 0701)  SpO2: 100 % (02/03/24 0701)    Current Vitals:   Blood Pressure: 170/76 (02/03/24 0701)  Pulse: 69 (02/03/24 1415)  Temperature: (!) 97.3 °F (36.3 °C) (02/03/24 0701)  Temp Source: Oral (02/03/24 0701)  Respirations: 17 (02/03/24 1415)  Height: 5' 4\" (162.6 cm) (02/03/24 0701)  Weight - Scale: 96.2 kg (212 lb) (02/03/24 0701)  SpO2: 98 % (02/03/24 1415)      Intake/Output Summary (Last 24 hours) at 2/3/2024 1512  Last data filed at 2/3/2024 1026  Gross per 24 hour   Intake 50 ml   Output --   Net 50 ml       Invasive Devices       Peripheral Intravenous Line  Duration             Peripheral IV 02/03/24 Dorsal (posterior);Right Forearm <1 day                    Physical Exam:  General: No acute distress  Neuro: Awake, Alert and Oriented x 3  HEENT:  Normocephalic, atraumatic, moist mucous membranes  CV: Regular rate and rhythm  Lungs: Normal work of breathing, no increased respiratory " effort  Abdomen: Soft, minimal to no RUQ pain, minimal tenderness in epigastrium, Truong sign negative, non-distended.   Extremities: No edema, clubbing or cyanosis  Skin: Warm, dry    Lab Results: I have personally reviewed pertinent lab results.  , CBC:   Lab Results   Component Value Date    WBC 7.89 02/03/2024    HGB 13.8 02/03/2024    HCT 39.0 02/03/2024    MCV 89 02/03/2024     02/03/2024    RBC 4.38 02/03/2024    MCH 31.5 02/03/2024    MCHC 35.4 02/03/2024    RDW 12.9 02/03/2024    MPV 10.5 02/03/2024    NRBC 0 02/03/2024   , CMP:   Lab Results   Component Value Date    SODIUM 134 (L) 02/03/2024    K 4.0 02/03/2024     02/03/2024    CO2 22 02/03/2024    BUN 15 02/03/2024    CREATININE 1.07 02/03/2024    CALCIUM 8.7 02/03/2024    AST 20 02/03/2024    ALT 32 02/03/2024    ALKPHOS 52 02/03/2024    EGFR 81 02/03/2024     Imaging: I have personally reviewed pertinent reports.    EKG, Pathology, and Other Studies: I have personally reviewed pertinent reports.      Code Status: Level 1 - Full Code  Advance Directive and Living Will:      Power of :    POLST:      Counseling / Coordination of Care  Total floor / unit time spent today 25 minutes. This involved direct patient contact where I performed a full history and physical, reviewed previous records, and reviewed laboratory and other diagnostic studies. Greater than 50% of total time was spent with the patient and / or family counseling and / or coordination of care.    Urbano Cook MD  General Surgery PGY-1  2/3/2024

## 2024-02-04 ENCOUNTER — ANESTHESIA (INPATIENT)
Dept: PERIOP | Facility: HOSPITAL | Age: 49
DRG: 418 | End: 2024-02-04
Payer: COMMERCIAL

## 2024-02-04 ENCOUNTER — ANESTHESIA EVENT (INPATIENT)
Dept: PERIOP | Facility: HOSPITAL | Age: 49
DRG: 418 | End: 2024-02-04
Payer: COMMERCIAL

## 2024-02-04 ENCOUNTER — APPOINTMENT (INPATIENT)
Dept: RADIOLOGY | Facility: HOSPITAL | Age: 49
DRG: 418 | End: 2024-02-04
Payer: COMMERCIAL

## 2024-02-04 LAB
ALBUMIN SERPL BCP-MCNC: 3.9 G/DL (ref 3.5–5)
ALP SERPL-CCNC: 52 U/L (ref 34–104)
ALT SERPL W P-5'-P-CCNC: 32 U/L (ref 7–52)
ANION GAP SERPL CALCULATED.3IONS-SCNC: 9 MMOL/L
AST SERPL W P-5'-P-CCNC: 25 U/L (ref 13–39)
ATRIAL RATE: 57 BPM
BASOPHILS # BLD AUTO: 0.02 THOUSANDS/ÂΜL (ref 0–0.1)
BASOPHILS NFR BLD AUTO: 0 % (ref 0–1)
BILIRUB SERPL-MCNC: 0.9 MG/DL (ref 0.2–1)
BUN SERPL-MCNC: 9 MG/DL (ref 5–25)
CALCIUM SERPL-MCNC: 8.5 MG/DL (ref 8.4–10.2)
CHLORIDE SERPL-SCNC: 107 MMOL/L (ref 96–108)
CHOLEST SERPL-MCNC: 133 MG/DL
CO2 SERPL-SCNC: 22 MMOL/L (ref 21–32)
CREAT SERPL-MCNC: 1.02 MG/DL (ref 0.6–1.3)
EOSINOPHIL # BLD AUTO: 0.17 THOUSAND/ÂΜL (ref 0–0.61)
EOSINOPHIL NFR BLD AUTO: 3 % (ref 0–6)
ERYTHROCYTE [DISTWIDTH] IN BLOOD BY AUTOMATED COUNT: 13 % (ref 11.6–15.1)
EST. AVERAGE GLUCOSE BLD GHB EST-MCNC: 108 MG/DL
GFR SERPL CREATININE-BSD FRML MDRD: 85 ML/MIN/1.73SQ M
GLUCOSE SERPL-MCNC: 98 MG/DL (ref 65–140)
HBA1C MFR BLD: 5.4 %
HCT VFR BLD AUTO: 37.6 % (ref 36.5–49.3)
HDLC SERPL-MCNC: 27 MG/DL
HGB BLD-MCNC: 13.2 G/DL (ref 12–17)
IMM GRANULOCYTES # BLD AUTO: 0.02 THOUSAND/UL (ref 0–0.2)
IMM GRANULOCYTES NFR BLD AUTO: 0 % (ref 0–2)
LDLC SERPL CALC-MCNC: 83 MG/DL (ref 0–100)
LYMPHOCYTES # BLD AUTO: 2 THOUSANDS/ÂΜL (ref 0.6–4.47)
LYMPHOCYTES NFR BLD AUTO: 32 % (ref 14–44)
MAGNESIUM SERPL-MCNC: 2.2 MG/DL (ref 1.9–2.7)
MCH RBC QN AUTO: 31.8 PG (ref 26.8–34.3)
MCHC RBC AUTO-ENTMCNC: 35.1 G/DL (ref 31.4–37.4)
MCV RBC AUTO: 91 FL (ref 82–98)
MONOCYTES # BLD AUTO: 0.6 THOUSAND/ÂΜL (ref 0.17–1.22)
MONOCYTES NFR BLD AUTO: 10 % (ref 4–12)
NEUTROPHILS # BLD AUTO: 3.52 THOUSANDS/ÂΜL (ref 1.85–7.62)
NEUTS SEG NFR BLD AUTO: 55 % (ref 43–75)
NRBC BLD AUTO-RTO: 0 /100 WBCS
P AXIS: 61 DEGREES
PHOSPHATE SERPL-MCNC: 2.9 MG/DL (ref 2.7–4.5)
PLATELET # BLD AUTO: 277 THOUSANDS/UL (ref 149–390)
PLATELET # BLD AUTO: 283 THOUSANDS/UL (ref 149–390)
PMV BLD AUTO: 10 FL (ref 8.9–12.7)
PMV BLD AUTO: 10.1 FL (ref 8.9–12.7)
POTASSIUM SERPL-SCNC: 4 MMOL/L (ref 3.5–5.3)
PR INTERVAL: 130 MS
PROT SERPL-MCNC: 6.8 G/DL (ref 6.4–8.4)
QRS AXIS: 148 DEGREES
QRSD INTERVAL: 132 MS
QT INTERVAL: 428 MS
QTC INTERVAL: 416 MS
RBC # BLD AUTO: 4.15 MILLION/UL (ref 3.88–5.62)
SODIUM SERPL-SCNC: 138 MMOL/L (ref 135–147)
T WAVE AXIS: -46 DEGREES
TRIGL SERPL-MCNC: 117 MG/DL
VENTRICULAR RATE: 57 BPM
WBC # BLD AUTO: 6.33 THOUSAND/UL (ref 4.31–10.16)

## 2024-02-04 PROCEDURE — 74300 X-RAY BILE DUCTS/PANCREAS: CPT | Performed by: SURGERY

## 2024-02-04 PROCEDURE — 99233 SBSQ HOSP IP/OBS HIGH 50: CPT | Performed by: SURGERY

## 2024-02-04 PROCEDURE — 88304 TISSUE EXAM BY PATHOLOGIST: CPT | Performed by: PATHOLOGY

## 2024-02-04 PROCEDURE — 85025 COMPLETE CBC W/AUTO DIFF WBC: CPT

## 2024-02-04 PROCEDURE — 80061 LIPID PANEL: CPT | Performed by: NURSE PRACTITIONER

## 2024-02-04 PROCEDURE — 74300 X-RAY BILE DUCTS/PANCREAS: CPT

## 2024-02-04 PROCEDURE — 99222 1ST HOSP IP/OBS MODERATE 55: CPT | Performed by: INTERNAL MEDICINE

## 2024-02-04 PROCEDURE — 83036 HEMOGLOBIN GLYCOSYLATED A1C: CPT | Performed by: NURSE PRACTITIONER

## 2024-02-04 PROCEDURE — 84100 ASSAY OF PHOSPHORUS: CPT

## 2024-02-04 PROCEDURE — 83735 ASSAY OF MAGNESIUM: CPT

## 2024-02-04 PROCEDURE — 94664 DEMO&/EVAL PT USE INHALER: CPT

## 2024-02-04 PROCEDURE — 0FT44ZZ RESECTION OF GALLBLADDER, PERCUTANEOUS ENDOSCOPIC APPROACH: ICD-10-PCS | Performed by: SURGERY

## 2024-02-04 PROCEDURE — 47563 LAPARO CHOLECYSTECTOMY/GRAPH: CPT | Performed by: SURGERY

## 2024-02-04 PROCEDURE — 80053 COMPREHEN METABOLIC PANEL: CPT

## 2024-02-04 PROCEDURE — 85049 AUTOMATED PLATELET COUNT: CPT

## 2024-02-04 PROCEDURE — BF131ZZ FLUOROSCOPY OF GALLBLADDER AND BILE DUCTS USING LOW OSMOLAR CONTRAST: ICD-10-PCS | Performed by: SURGERY

## 2024-02-04 RX ORDER — OXYCODONE HYDROCHLORIDE 10 MG/1
10 TABLET ORAL EVERY 6 HOURS PRN
Status: DISCONTINUED | OUTPATIENT
Start: 2024-02-04 | End: 2024-02-05 | Stop reason: HOSPADM

## 2024-02-04 RX ORDER — LABETALOL HYDROCHLORIDE 5 MG/ML
INJECTION, SOLUTION INTRAVENOUS AS NEEDED
Status: DISCONTINUED | OUTPATIENT
Start: 2024-02-04 | End: 2024-02-04

## 2024-02-04 RX ORDER — HYDROMORPHONE HYDROCHLORIDE 1 MG/ML
INJECTION, SOLUTION INTRAMUSCULAR; INTRAVENOUS; SUBCUTANEOUS AS NEEDED
Status: DISCONTINUED | OUTPATIENT
Start: 2024-02-04 | End: 2024-02-04

## 2024-02-04 RX ORDER — METRONIDAZOLE 500 MG/100ML
500 INJECTION, SOLUTION INTRAVENOUS EVERY 8 HOURS
Status: DISCONTINUED | OUTPATIENT
Start: 2024-02-04 | End: 2024-02-04

## 2024-02-04 RX ORDER — UREA 10 %
500 LOTION (ML) TOPICAL DAILY
Status: DISCONTINUED | OUTPATIENT
Start: 2024-02-04 | End: 2024-02-05 | Stop reason: HOSPADM

## 2024-02-04 RX ORDER — FENTANYL CITRATE/PF 50 MCG/ML
25 SYRINGE (ML) INJECTION
Status: DISCONTINUED | OUTPATIENT
Start: 2024-02-04 | End: 2024-02-04 | Stop reason: HOSPADM

## 2024-02-04 RX ORDER — ALBUTEROL SULFATE 90 UG/1
2 AEROSOL, METERED RESPIRATORY (INHALATION) EVERY 4 HOURS PRN
Status: DISCONTINUED | OUTPATIENT
Start: 2024-02-04 | End: 2024-02-05 | Stop reason: HOSPADM

## 2024-02-04 RX ORDER — SUCCINYLCHOLINE/SOD CL,ISO/PF 100 MG/5ML
SYRINGE (ML) INTRAVENOUS AS NEEDED
Status: DISCONTINUED | OUTPATIENT
Start: 2024-02-04 | End: 2024-02-04

## 2024-02-04 RX ORDER — MAGNESIUM HYDROXIDE 1200 MG/15ML
LIQUID ORAL AS NEEDED
Status: DISCONTINUED | OUTPATIENT
Start: 2024-02-04 | End: 2024-02-04 | Stop reason: HOSPADM

## 2024-02-04 RX ORDER — ONDANSETRON 2 MG/ML
4 INJECTION INTRAMUSCULAR; INTRAVENOUS EVERY 4 HOURS PRN
Status: DISCONTINUED | OUTPATIENT
Start: 2024-02-04 | End: 2024-02-04 | Stop reason: HOSPADM

## 2024-02-04 RX ORDER — ROCURONIUM BROMIDE 10 MG/ML
INJECTION, SOLUTION INTRAVENOUS AS NEEDED
Status: DISCONTINUED | OUTPATIENT
Start: 2024-02-04 | End: 2024-02-04

## 2024-02-04 RX ORDER — PROPOFOL 10 MG/ML
INJECTION, EMULSION INTRAVENOUS AS NEEDED
Status: DISCONTINUED | OUTPATIENT
Start: 2024-02-04 | End: 2024-02-04

## 2024-02-04 RX ORDER — OXYCODONE HYDROCHLORIDE 5 MG/1
5 TABLET ORAL EVERY 6 HOURS PRN
Status: DISCONTINUED | OUTPATIENT
Start: 2024-02-04 | End: 2024-02-05 | Stop reason: HOSPADM

## 2024-02-04 RX ORDER — BUPIVACAINE HYDROCHLORIDE 5 MG/ML
INJECTION, SOLUTION EPIDURAL; INTRACAUDAL AS NEEDED
Status: DISCONTINUED | OUTPATIENT
Start: 2024-02-04 | End: 2024-02-04 | Stop reason: HOSPADM

## 2024-02-04 RX ORDER — CEFAZOLIN SODIUM 2 G/50ML
2000 SOLUTION INTRAVENOUS EVERY 8 HOURS
Qty: 50 ML | Refills: 0 | Status: DISCONTINUED | OUTPATIENT
Start: 2024-02-04 | End: 2024-02-04

## 2024-02-04 RX ORDER — SODIUM CHLORIDE, SODIUM LACTATE, POTASSIUM CHLORIDE, CALCIUM CHLORIDE 600; 310; 30; 20 MG/100ML; MG/100ML; MG/100ML; MG/100ML
INJECTION, SOLUTION INTRAVENOUS CONTINUOUS PRN
Status: DISCONTINUED | OUTPATIENT
Start: 2024-02-04 | End: 2024-02-04

## 2024-02-04 RX ORDER — DEXAMETHASONE SODIUM PHOSPHATE 10 MG/ML
INJECTION, SOLUTION INTRAMUSCULAR; INTRAVENOUS AS NEEDED
Status: DISCONTINUED | OUTPATIENT
Start: 2024-02-04 | End: 2024-02-04

## 2024-02-04 RX ORDER — ACETAMINOPHEN 325 MG/1
975 TABLET ORAL EVERY 8 HOURS SCHEDULED
Status: DISCONTINUED | OUTPATIENT
Start: 2024-02-04 | End: 2024-02-05 | Stop reason: HOSPADM

## 2024-02-04 RX ORDER — FENTANYL CITRATE 50 UG/ML
INJECTION, SOLUTION INTRAMUSCULAR; INTRAVENOUS AS NEEDED
Status: DISCONTINUED | OUTPATIENT
Start: 2024-02-04 | End: 2024-02-04

## 2024-02-04 RX ORDER — CEFAZOLIN SODIUM 2 G/50ML
2000 SOLUTION INTRAVENOUS
Status: COMPLETED | OUTPATIENT
Start: 2024-02-04 | End: 2024-02-04

## 2024-02-04 RX ORDER — LIDOCAINE HCL/PF 100 MG/5ML
SYRINGE (ML) INJECTION AS NEEDED
Status: DISCONTINUED | OUTPATIENT
Start: 2024-02-04 | End: 2024-02-04

## 2024-02-04 RX ADMIN — HEPARIN SODIUM 5000 UNITS: 5000 INJECTION INTRAVENOUS; SUBCUTANEOUS at 20:44

## 2024-02-04 RX ADMIN — Medication 20 MG: at 13:22

## 2024-02-04 RX ADMIN — FENTANYL CITRATE 25 MCG: 50 INJECTION INTRAMUSCULAR; INTRAVENOUS at 16:52

## 2024-02-04 RX ADMIN — FENTANYL CITRATE 25 MCG: 50 INJECTION INTRAMUSCULAR; INTRAVENOUS at 16:56

## 2024-02-04 RX ADMIN — HEPARIN SODIUM 5000 UNITS: 5000 INJECTION INTRAVENOUS; SUBCUTANEOUS at 05:37

## 2024-02-04 RX ADMIN — Medication 100 MG: at 13:15

## 2024-02-04 RX ADMIN — OXYCODONE HYDROCHLORIDE 10 MG: 10 TABLET ORAL at 18:22

## 2024-02-04 RX ADMIN — SODIUM CHLORIDE, SODIUM GLUCONATE, SODIUM ACETATE, POTASSIUM CHLORIDE, MAGNESIUM CHLORIDE, SODIUM PHOSPHATE, DIBASIC, AND POTASSIUM PHOSPHATE 125 ML/HR: .53; .5; .37; .037; .03; .012; .00082 INJECTION, SOLUTION INTRAVENOUS at 05:37

## 2024-02-04 RX ADMIN — FENTANYL CITRATE 100 MCG: 50 INJECTION INTRAMUSCULAR; INTRAVENOUS at 13:15

## 2024-02-04 RX ADMIN — Medication 7.5 MG: at 14:07

## 2024-02-04 RX ADMIN — METRONIDAZOLE 500 MG: 500 INJECTION, SOLUTION INTRAVENOUS at 09:57

## 2024-02-04 RX ADMIN — ACETAMINOPHEN 975 MG: 325 TABLET, FILM COATED ORAL at 18:41

## 2024-02-04 RX ADMIN — HYDROMORPHONE HYDROCHLORIDE 1 MG: 1 INJECTION, SOLUTION INTRAMUSCULAR; INTRAVENOUS; SUBCUTANEOUS at 14:04

## 2024-02-04 RX ADMIN — SODIUM CHLORIDE, SODIUM LACTATE, POTASSIUM CHLORIDE, AND CALCIUM CHLORIDE: .6; .31; .03; .02 INJECTION, SOLUTION INTRAVENOUS at 13:13

## 2024-02-04 RX ADMIN — PROPOFOL 200 MG: 10 INJECTION, EMULSION INTRAVENOUS at 13:15

## 2024-02-04 RX ADMIN — LIDOCAINE HYDROCHLORIDE 50 MG: 20 INJECTION INTRAVENOUS at 13:15

## 2024-02-04 RX ADMIN — ONDANSETRON 4 MG: 2 INJECTION INTRAMUSCULAR; INTRAVENOUS at 15:53

## 2024-02-04 RX ADMIN — ROCURONIUM BROMIDE 10 MG: 10 INJECTION, SOLUTION INTRAVENOUS at 15:04

## 2024-02-04 RX ADMIN — CEFAZOLIN SODIUM 2000 MG: 2 SOLUTION INTRAVENOUS at 13:08

## 2024-02-04 RX ADMIN — FENTANYL CITRATE 25 MCG: 50 INJECTION INTRAMUSCULAR; INTRAVENOUS at 16:41

## 2024-02-04 RX ADMIN — SUGAMMADEX 200 MG: 100 INJECTION, SOLUTION INTRAVENOUS at 16:06

## 2024-02-04 RX ADMIN — FENTANYL CITRATE 25 MCG: 50 INJECTION INTRAMUSCULAR; INTRAVENOUS at 16:48

## 2024-02-04 RX ADMIN — DEXAMETHASONE SODIUM PHOSPHATE 10 MG: 10 INJECTION, SOLUTION INTRAMUSCULAR; INTRAVENOUS at 13:15

## 2024-02-04 RX ADMIN — FENTANYL CITRATE 25 MCG: 50 INJECTION INTRAMUSCULAR; INTRAVENOUS at 15:52

## 2024-02-04 RX ADMIN — ROCURONIUM BROMIDE 20 MG: 10 INJECTION, SOLUTION INTRAVENOUS at 13:46

## 2024-02-04 RX ADMIN — ROCURONIUM BROMIDE 10 MG: 10 INJECTION, SOLUTION INTRAVENOUS at 13:15

## 2024-02-04 NOTE — OP NOTE
OPERATIVE REPORT  PATIENT NAME: Cricket Reyes    :  1975  MRN: [id number]  Pt Location: [facility] OR ROOM 04    SURGERY DATE: 2024    Surgeons and Role:     * Siddharth White DO - Primary     * Paul Dial MD - Assisting     * Sam Baer MD - Assisting    Preop Diagnosis:  Acute cholecystitis [K81.0]    Post-Op Diagnosis Codes:     * Acute cholecystitis [K81.0]    Procedure(s):  CHOLECYSTECTOMY LAPAROSCOPIC. intra op cholangiogram    Specimen(s):  ID Type Source Tests Collected by Time Destination   1 :  Tissue Gallbladder TISSUE EXAM Siddharth White DO 2024 1550        Estimated Blood Loss:   Minimal    Drains:  * No LDAs found *    Anesthesia Type:   General    Operative Indications:  Acute cholecystitis [K81.0]      Operative Findings:  Acute Cholecystitis, Large Gallstones    Intra-operative cholangiogram confirmation of ductal anatomy     Complications:   None    Procedure and Technique:  Patient was identified in the preoperative holding area and following review of consent for procedure was taken to the operating room.  There he was transferred to the operating table, positioned supine with arms extended bilaterally and placed under general anesthesia with endotracheal intubation.  Perioperative antibiotics were dosed intravenously.  The abdomen was prepped and draped in the usual sterile fashion with ChloraPrep which was allowed to dry completely.  Prior to commencement of the operation, a timeout checklist was satisfactorily completed and agreed upon by all members of the surgical team.    The abdomen was entered via cutdown above the umbilicus and a 5 mm working port was placed.  The abdomen was inspected with the laparoscope and no injury associated with abdominal entry was identified.  A 12 mm working port was placed in the upper midline just to the right of the falciform ligament under direct visualization.  2 additional 5 mm working ports were placed laterally in the  right upper quadrant along the axis of the costal margin under direct visualization.  The patient was placed in mild reverse Trendelenburg position with the left side down to facilitate better surgical exposure of the gallbladder.  Omental attachments to the gallbladder dome were taken bluntly.  The gallbladder was tense and inflamed and was decompressed via suction with an aspiration needle.    The gallbladder was grasped at its fundus and retracted cephalad over the liver.  The infundibulum was grasped and retracted medially and laterally as needed to facilitate dissection within the triangle of Yuniel.  There were dense adhesions near the infundibular cystic junction making dissection challenging.  Once the gallbladder infundibulum was more clearly identified, decision was made to pursue an intraoperative cholangiogram to better delineate the biliary tree anatomy.    Intraoperative cholangiogram was performed in the usual fashion using a Villalpando clamp to pass the cholangiocatheter into the infundibular cystic junction in total, 40 cc of Omnipaque contrast was used over 3 fluoroscopic runs to visualize the cystic duct.  Scopic images were reviewed and the cystic duct was seen coursing into the common bile duct distal from the planned field of dissection.  At this time the cholangiogram was aborted and attention was returned toward dissection and isolation of the cystic duct and cystic artery to facilitate a critical view of safety.    Cystic duct was skeletonized circumferentially and seen coursing directly into the gallbladder.  It was ligated with surgical clips and divided with scissors.  The cystic artery was identified clearly coursing into the gallbladder and taken in the same manner.    Gallbladder was retracted cephalad and dissected off of the liver bed with cautery.  The gallbladder was then placed in an Endo Catch specimen bag.  The patient was returned to level position and the abdomen was irrigated.   The gallbladder fossa and the surgical bed were investigated for signs of bleeding and hemostasis was confirmed.  Following irrigation of the abdomen, the 12 mm fascial incision was enlarged slightly to facilitate extraction of the specimen intact.    At this site was closed with 0 Vicryl suture placed using a laparoscopic suture passer in a figure-of-eight fashion.    Incisions were anesthetized with local anesthetic, closed with 4-0 Monocryl, cleansed, dried, and sealed with Exofin.  The patient was awakened on table without issue, extubated uneventfully, and taken to the PACU for further recovery in stable condition.     Dr. White was present for all critical portions of the procedure.    Patient Disposition:  PACU  and extubated and stable        SIGNATURE: Paul Dial MD  DATE: February 4, 2024  TIME: 4:12 PM

## 2024-02-04 NOTE — ANESTHESIA PREPROCEDURE EVALUATION
Procedure:  CHOLECYSTECTOMY LAPAROSCOPIC (Abdomen)    Relevant Problems   No relevant active problems        Physical Exam    Airway    Mallampati score: II  TM Distance: >3 FB  Neck ROM: full     Dental   No notable dental hx     Cardiovascular  Cardiovascular exam normal    Pulmonary  Pulmonary exam normal     Other Findings      Anesthesia Plan  ASA Score- 2 Emergent    Anesthesia Type- general with ASA Monitors.         Additional Monitors:     Airway Plan: ETT.           Plan Factors-Exercise tolerance (METS): >4 METS.    Chart reviewed. EKG reviewed.  Existing labs reviewed.     Patient is not a current smoker. Patient not instructed to abstain from smoking on day of procedure. Patient did not smoke on day of surgery.            Induction- intravenous.    Postoperative Plan-     Informed Consent- Anesthetic plan and risks discussed with patient.  I personally reviewed this patient with the CRNA. Discussed and agreed on the Anesthesia Plan with the CRNA..

## 2024-02-04 NOTE — ANESTHESIA POSTPROCEDURE EVALUATION
Post-Op Assessment Note    CV Status:  Stable  Pain Score: 0    Pain management: adequate       Mental Status:  Alert and awake   Hydration Status:  Euvolemic   PONV Controlled:  Controlled   Airway Patency:  Patent  Two or more mitigation strategies used for obstructive sleep apnea   Post Op Vitals Reviewed: Yes    No anethesia notable event occurred.    Staff: CRNA               BP   157//79   Temp   97.8   Pulse  96   Resp   16   SpO2   91% 2 L NC

## 2024-02-04 NOTE — CONSULTS
Consultation - Cardiology Team 1  Cricket Reyes 48 y.o. male MRN: [id number]  Unit/Bed#: NW8 861-02 Encounter: [id number]  02/04/24  8:10 AM    Assessment/ Plan:  1. Preoperative cardiac risk stratification   - presenting with right upper quadrant abdominal pain - ultrasound concerning for acute cholecystitis (see #2)  - presenting ECG - sinus bradycardia, T wave abnormality in inferior leads concerning for ischemia, no signs of acute ischemia  - outpatient echo scheduled - will obtain now; no prior studies to compare  - check lipid panel, hemoglobin A1c  - functional capacity - independent at baseline, no anginal symptoms  - recommend placing on telemetry post procedure  - okay to proceed to OR without any further cardiac workup  - can consider future ischemic evaluation given ischemic-looking ECG if anginal symptoms present    2. Acute cholecystitis  - as evidence on abdominal ultrasound + CT a/p  - general surgery following - plan for laparoscopic cholecystectomy  - maintain n.p.o. status  - on IV ancef + Flagyl    3. Atrial flutter  - s/p cardioversion in ED (1/29/2024) - new diagnosis  - presenting ECG - SB, HR 57, T wave abnormality in inferior leads  - not on telemetry  - recently placed on Eliquis 5 mg twice daily for systemic anticoagulation; AC on hold in the setting of upcoming procedure  - if OR is delayed - recommend initiating heparin gtt with eventual transition back to Eliquis/Xarelto postprocedure  - not placed on/rhythm control medications post cardioversion - recommend placing on telemetry to assess rate/rhythm (regular on PE) to consider initiation of medications  - continue to monitor on telemetry    History of Present Illness   Physician Requesting Consult: Dinesh Davies,*    Reason for Consult / Principal Problem: Preoperative cardiac risk stratification    HPI: Cricket Reyes is a 48 y.o. male with past medical history of asthma and newly diagnosed atrial flutter who presents to the  ED yesterday with complaints of right upper quadrant abdominal pain.  Upon arrival to the ED, right upper quadrant ultrasound demonstrated gallbladder wall thickening with stones and pericholecystic fluid consistent with acute cholecystitis for which general surgery was consulted who are planning to take the patient to the OR for lap fawn.  Cardiology was consulted for preoperative cardiac risk stratification.    Of note, patient was just in the ED on 1/29/2024 secondary to new onset atrial flutter with RVR with symptoms of shortness of breath.  He was ill with a GI bug several days prior to presentation with nausea, vomiting, and diarrhea.  He underwent a successful cardioversion and placed on Eliquis 5 mg twice daily for stroke prevention.  He was not placed on any rate/rhythm control medication upon discharge and was instructed to follow-up with his PCP, in addition to obtaining an outpatient echocardiogram.    Upon examination, patient reports onset of GI symptoms prior to coming to the ED on the 29th.  Following his cardioversion, patient reports GI symptoms lessened.  However, yesterday he had a pizza and his symptoms came back.  He denies any chest pain, shortness of breath, lightheadedness, dizziness, or palpitations.  Patient notes that he has not seen a PCP in the last 15 years and was struggling to obtain his anticoagulation because of this.  He eventually was seen by a PCP at which time he was given a card for Xarelto, but has not yet had this filled.  Patient denies any alcohol, drug, or tobacco abuse.  He does note that he drinks preworkout and a BCAA supplement.  Patient remains active and is currently a .  He denies any anginal symptoms. He does not follow with a cardiologist outpatient.    Inpatient consult to Cardiology  Consult performed by: TWILA Myers  Consult ordered by: Sam Baer MD      EKG: SB, HR 57, T wave abnormality in inferior leads    Review of Systems  "  Constitutional: Negative.    HENT: Negative.     Eyes: Negative.    Respiratory: Negative.  Negative for chest tightness and shortness of breath.    Cardiovascular:  Negative for chest pain, palpitations and leg swelling.   Gastrointestinal:  Positive for abdominal pain, diarrhea, nausea and vomiting.   Endocrine: Negative.    Genitourinary: Negative.    Musculoskeletal: Negative.    Allergic/Immunologic: Negative.    Neurological: Negative.    Hematological: Negative.    Psychiatric/Behavioral: Negative.       Historical Information   Past Medical History:   Diagnosis Date    Asthma     Heart murmur      Past Surgical History:   Procedure Laterality Date    ARTHROSCOPIC REPAIR ACL Right     HERNIA REPAIR       Social History     Substance and Sexual Activity   Alcohol Use Yes    Comment: social     Social History     Substance and Sexual Activity   Drug Use Not Currently     Social History     Tobacco Use   Smoking Status Never    Passive exposure: Never   Smokeless Tobacco Never     Family History:   Family History   Problem Relation Age of Onset    Arthritis Father     LUDA disease Father     Cancer Sister     Epididymitis Daughter     Diabetes Paternal Aunt     Diabetes Paternal Uncle      Meds/Allergies   all current active meds have been reviewed  No Known Allergies    Objective   Vitals: Blood pressure 133/93, pulse 59, temperature 97.9 °F (36.6 °C), temperature source Oral, resp. rate 18, height 5' 4\" (1.626 m), weight 96.2 kg (212 lb), SpO2 98%., Body mass index is 36.39 kg/m².,   Orthostatic Blood Pressures      Flowsheet Row Most Recent Value   Blood Pressure 133/93 filed at 2024 2200   Patient Position - Orthostatic VS Sitting filed at 2024 2200          Systolic (24hrs), Av , Min:133 , Max:143     Diastolic (24hrs), Av, Min:66, Max:93      Intake/Output Summary (Last 24 hours) at 2024 0810  Last data filed at 2024 0701  Gross per 24 hour   Intake 70 ml   Output --   Net 70 " ml     Invasive Devices       Peripheral Intravenous Line  Duration             Peripheral IV 02/03/24 Dorsal (posterior);Right Forearm 1 day                    Physical Exam  Constitutional:       Appearance: He is obese.   HENT:      Head: Normocephalic.      Mouth/Throat:      Pharynx: Oropharynx is clear.   Eyes:      Pupils: Pupils are equal, round, and reactive to light.   Cardiovascular:      Rate and Rhythm: Regular rhythm. Bradycardia present.      Pulses: Normal pulses.      Heart sounds: Normal heart sounds.   Pulmonary:      Effort: Pulmonary effort is normal. No respiratory distress.      Breath sounds: No wheezing or rales.      Comments: On RA  Abdominal:      Palpations: Abdomen is soft.      Tenderness: There is abdominal tenderness.   Musculoskeletal:         General: Normal range of motion.      Cervical back: Normal range of motion.      Right lower leg: No edema.      Left lower leg: No edema.   Skin:     General: Skin is warm and dry.      Capillary Refill: Capillary refill takes less than 2 seconds.   Neurological:      General: No focal deficit present.      Mental Status: He is alert and oriented to person, place, and time.   Psychiatric:         Mood and Affect: Mood normal.         Behavior: Behavior normal.     Lab Results:   Troponins:     CBC with diff:   Results from last 7 days   Lab Units 02/04/24  0533 02/03/24  0801 01/29/24  1235   WBC Thousand/uL 6.33 7.89 14.53*   HEMOGLOBIN g/dL 13.2 13.8 16.0   HEMATOCRIT % 37.6 39.0 46.9   MCV fL 91 89 90   PLATELETS Thousands/uL 277 307 290   RBC Million/uL 4.15 4.38 5.19   MCH pg 31.8 31.5 30.8   MCHC g/dL 35.1 35.4 34.1   RDW % 13.0 12.9 13.2   MPV fL 10.0 10.5 11.3   NRBC AUTO /100 WBCs 0 0 0     CMP:   Results from last 7 days   Lab Units 02/04/24  0533 02/03/24  0801 01/29/24  1235   POTASSIUM mmol/L 4.0 4.0 3.9   CHLORIDE mmol/L 107 103 98   CO2 mmol/L 22 22 27   BUN mg/dL 9 15 16   CREATININE mg/dL 1.02 1.07 1.45*   CALCIUM mg/dL 8.5  8.7 9.9   AST U/L 25 20 18   ALT U/L 32 32 21   ALK PHOS U/L 52 52 60   EGFR ml/min/1.73sq m 85 81 56

## 2024-02-04 NOTE — PROGRESS NOTES
"General Surgery  Progress Note   Cricket Reyes 48 y.o. male MRN: [id number]  Unit/Bed#: NW8 861-02 Encounter: [id number]    Assessment:  49 yo male present with abdominal pain. RUQ US shows gallbladder wall thickening with stones and pericholecystic fluid. Concerns for acute cholecystitis.    AVSS on room air  UOP: 1 unmeasured urine occurrence    WBC pending from 7.8  Hgb pending from 13.8  Plts pending from 307  Cr pending from 1.07  Tbili pending from 0.52      2/3 RUQ US: Gallbladder wall thickening with stones and pericholecystic fluid.     Plan:  -Plan for laparoscopic cholecystectomy  -Cardiology consultation for cardiac risk assessment  -NPO  -IV fluids  -IV antibiotics  -Pain/ nausea control PRN  -OOB/ ambulation  -Incentive Spirometry      Subjective/Objective     Subjective:   Patient seen and examined at bedside, in no acute distress. No acute events overnight.  Patient reports that as of 9 PM his pain is resolved.  Patient is ambulating.  Patient is passing flatus.  Patient denies nausea vomiting fevers chills chest pain or shortness of breath.    Objective:   Vitals:Blood pressure 133/93, pulse 59, temperature 97.9 °F (36.6 °C), temperature source Oral, resp. rate 18, height 5' 4\" (1.626 m), weight 96.2 kg (212 lb), SpO2 98%.  Temp (24hrs), Av.8 °F (36.6 °C), Min:97.3 °F (36.3 °C), Max:98.1 °F (36.7 °C)      I/O          0701   0700  0701   0700    I.V. (mL/kg)  10 (0.1)    IV Piggyback  50    Total Intake(mL/kg)  60 (0.6)    Net  +60          Unmeasured Urine Occurrence  1 x            Invasive Devices       Peripheral Intravenous Line  Duration             Peripheral IV 24 Dorsal (posterior);Right Forearm <1 day                    Physical Exam:  General: No acute distress  Neuro: Awake, Alert and Oriented x 3  HEENT:  Normocephalic, atraumatic, moist mucous membranes  CV: Regular rate and rhythm  Lungs: Normal work of breathing, no increased respiratory effort  Abdomen: " Soft, non-tender, non-distended.  Truong sign negative.  Extremities: No edema, clubbing or cyanosis  Skin: Warm, dry    Lab Results   Component Value Date    WBC 6.33 02/04/2024    HGB 13.2 02/04/2024    HCT 37.6 02/04/2024    MCV 91 02/04/2024     02/04/2024      Lab Results   Component Value Date    SODIUM 138 02/04/2024    K 4.0 02/04/2024     02/04/2024    CO2 22 02/04/2024    AGAP 9 02/04/2024    BUN 9 02/04/2024    CREATININE 1.02 02/04/2024    GLUC 98 02/04/2024    CALCIUM 8.5 02/04/2024    AST 25 02/04/2024    ALT 32 02/04/2024    ALKPHOS 52 02/04/2024    TP 6.8 02/04/2024    TBILI 0.90 02/04/2024    EGFR 85 02/04/2024       VTE Prophylaxis: Heparin        Urbano Cook MD  2/4/2024  6:52 AM

## 2024-02-05 ENCOUNTER — APPOINTMENT (INPATIENT)
Dept: NON INVASIVE DIAGNOSTICS | Facility: HOSPITAL | Age: 49
DRG: 418 | End: 2024-02-05
Payer: COMMERCIAL

## 2024-02-05 VITALS
SYSTOLIC BLOOD PRESSURE: 120 MMHG | RESPIRATION RATE: 16 BRPM | DIASTOLIC BLOOD PRESSURE: 57 MMHG | HEART RATE: 77 BPM | OXYGEN SATURATION: 93 % | TEMPERATURE: 98 F | WEIGHT: 212 LBS | HEIGHT: 64 IN | BODY MASS INDEX: 36.19 KG/M2

## 2024-02-05 PROBLEM — K81.0 ACUTE CHOLECYSTITIS: Status: ACTIVE | Noted: 2024-02-05

## 2024-02-05 LAB
ALBUMIN SERPL BCP-MCNC: 3.9 G/DL (ref 3.5–5)
ALP SERPL-CCNC: 54 U/L (ref 34–104)
ALT SERPL W P-5'-P-CCNC: 52 U/L (ref 7–52)
ANION GAP SERPL CALCULATED.3IONS-SCNC: 12 MMOL/L
AORTIC ROOT: 4.3 CM
AORTIC VALVE MEAN VELOCITY: 14.4 M/S
APICAL FOUR CHAMBER EJECTION FRACTION: 48 %
AST SERPL W P-5'-P-CCNC: 42 U/L (ref 13–39)
AV AREA BY CONTINUOUS VTI: 2.2 CM2
AV AREA PEAK VELOCITY: 2.3 CM2
AV LVOT MEAN GRADIENT: 2 MMHG
AV LVOT PEAK GRADIENT: 4 MMHG
AV MEAN GRADIENT: 9 MMHG
AV PEAK GRADIENT: 14 MMHG
AV REGURGITATION PRESSURE HALF TIME: 410 MS
AV VALVE AREA: 2.15 CM2
AV VELOCITY RATIO: 0.51
BASOPHILS # BLD AUTO: 0 THOUSANDS/ÂΜL (ref 0–0.1)
BASOPHILS NFR BLD AUTO: 0 % (ref 0–1)
BILIRUB SERPL-MCNC: 0.72 MG/DL (ref 0.2–1)
BSA FOR ECHO PROCEDURE: 2.01 M2
BUN SERPL-MCNC: 12 MG/DL (ref 5–25)
CALCIUM SERPL-MCNC: 8.6 MG/DL (ref 8.4–10.2)
CHLORIDE SERPL-SCNC: 103 MMOL/L (ref 96–108)
CO2 SERPL-SCNC: 22 MMOL/L (ref 21–32)
CREAT SERPL-MCNC: 1.14 MG/DL (ref 0.6–1.3)
DOP CALC AO PEAK VEL: 1.86 M/S
DOP CALC AO VTI: 39.45 CM
DOP CALC LVOT AREA: 4.52 CM2
DOP CALC LVOT CARDIAC INDEX: 2.8 L/MIN/M2
DOP CALC LVOT CARDIAC OUTPUT: 5.63 L/MIN
DOP CALC LVOT DIAMETER: 2.4 CM
DOP CALC LVOT PEAK VEL VTI: 18.78 CM
DOP CALC LVOT PEAK VEL: 0.94 M/S
DOP CALC LVOT STROKE INDEX: 43.8 ML/M2
DOP CALC LVOT STROKE VOLUME: 84.92
E WAVE DECELERATION TIME: 191 MS
E/A RATIO: 1.94
EOSINOPHIL # BLD AUTO: 0.02 THOUSAND/ÂΜL (ref 0–0.61)
EOSINOPHIL NFR BLD AUTO: 0 % (ref 0–6)
ERYTHROCYTE [DISTWIDTH] IN BLOOD BY AUTOMATED COUNT: 13 % (ref 11.6–15.1)
FRACTIONAL SHORTENING: 25 (ref 28–44)
GFR SERPL CREATININE-BSD FRML MDRD: 75 ML/MIN/1.73SQ M
GLUCOSE SERPL-MCNC: 113 MG/DL (ref 65–140)
HCT VFR BLD AUTO: 39.4 % (ref 36.5–49.3)
HGB BLD-MCNC: 13.5 G/DL (ref 12–17)
IMM GRANULOCYTES # BLD AUTO: 0.03 THOUSAND/UL (ref 0–0.2)
IMM GRANULOCYTES NFR BLD AUTO: 0 % (ref 0–2)
INTERVENTRICULAR SEPTUM IN DIASTOLE (PARASTERNAL SHORT AXIS VIEW): 1.2 CM
INTERVENTRICULAR SEPTUM: 1.2 CM (ref 0.6–1.1)
LAAS-AP4: 27.7 CM2
LEFT ATRIUM AREA SYSTOLE SINGLE PLANE A4C: 25.7 CM2
LEFT ATRIUM SIZE: 4 CM
LEFT INTERNAL DIMENSION IN SYSTOLE: 5.2 CM (ref 2.1–4)
LEFT VENTRICLE DIASTOLIC VOLUME (MOD BIPLANE): 317 ML
LEFT VENTRICLE DIASTOLIC VOLUME INDEX (MOD BIPLANE): 157.7 ML/M2
LEFT VENTRICLE SYSTOLIC VOLUME (MOD BIPLANE): 165 ML
LEFT VENTRICLE SYSTOLIC VOLUME INDEX (MOD BIPLANE): 82.1 ML/M2
LEFT VENTRICULAR INTERNAL DIMENSION IN DIASTOLE: 6.9 CM (ref 3.5–6)
LEFT VENTRICULAR POSTERIOR WALL IN END DIASTOLE: 1.2 CM
LEFT VENTRICULAR STROKE VOLUME: 119 ML
LV EF: 48 %
LVSV (TEICH): 119 ML
LYMPHOCYTES # BLD AUTO: 1.04 THOUSANDS/ÂΜL (ref 0.6–4.47)
LYMPHOCYTES NFR BLD AUTO: 12 % (ref 14–44)
MCH RBC QN AUTO: 31.9 PG (ref 26.8–34.3)
MCHC RBC AUTO-ENTMCNC: 34.3 G/DL (ref 31.4–37.4)
MCV RBC AUTO: 93 FL (ref 82–98)
MONOCYTES # BLD AUTO: 0.77 THOUSAND/ÂΜL (ref 0.17–1.22)
MONOCYTES NFR BLD AUTO: 9 % (ref 4–12)
MV E'TISSUE VEL-LAT: 7 CM/S
MV E'TISSUE VEL-SEP: 7 CM/S
MV PEAK A VEL: 0.48 M/S
MV PEAK E VEL: 93 CM/S
MV STENOSIS PRESSURE HALF TIME: 55 MS
MV VALVE AREA P 1/2 METHOD: 4
NEUTROPHILS # BLD AUTO: 7.12 THOUSANDS/ÂΜL (ref 1.85–7.62)
NEUTS SEG NFR BLD AUTO: 79 % (ref 43–75)
NRBC BLD AUTO-RTO: 0 /100 WBCS
PLATELET # BLD AUTO: 333 THOUSANDS/UL (ref 149–390)
PMV BLD AUTO: 10.4 FL (ref 8.9–12.7)
POTASSIUM SERPL-SCNC: 4.6 MMOL/L (ref 3.5–5.3)
PROT SERPL-MCNC: 6.9 G/DL (ref 6.4–8.4)
RBC # BLD AUTO: 4.23 MILLION/UL (ref 3.88–5.62)
RIGHT ATRIUM AREA SYSTOLE A4C: 21 CM2
RIGHT VENTRICLE ID DIMENSION: 4.5 CM
SL CV AV DECELERATION TIME RETROGRADE: 1413 MS
SL CV AV PEAK GRADIENT RETROGRADE: 61 MMHG
SL CV LV EF: 40
SL CV PED ECHO LEFT VENTRICLE DIASTOLIC VOLUME (MOD BIPLANE) 2D: 247 ML
SL CV PED ECHO LEFT VENTRICLE SYSTOLIC VOLUME (MOD BIPLANE) 2D: 128 ML
SODIUM SERPL-SCNC: 137 MMOL/L (ref 135–147)
TR MAX PG: 15 MMHG
TR PEAK VELOCITY: 2 M/S
TRICUSPID ANNULAR PLANE SYSTOLIC EXCURSION: 2.2 CM
TRICUSPID VALVE PEAK REGURGITATION VELOCITY: 1.96 M/S
WBC # BLD AUTO: 8.98 THOUSAND/UL (ref 4.31–10.16)

## 2024-02-05 PROCEDURE — NC001 PR NO CHARGE: Performed by: SURGERY

## 2024-02-05 PROCEDURE — 80053 COMPREHEN METABOLIC PANEL: CPT | Performed by: SURGERY

## 2024-02-05 PROCEDURE — 93306 TTE W/DOPPLER COMPLETE: CPT | Performed by: INTERNAL MEDICINE

## 2024-02-05 PROCEDURE — 99024 POSTOP FOLLOW-UP VISIT: CPT | Performed by: NURSE PRACTITIONER

## 2024-02-05 PROCEDURE — 85025 COMPLETE CBC W/AUTO DIFF WBC: CPT | Performed by: SURGERY

## 2024-02-05 PROCEDURE — 93306 TTE W/DOPPLER COMPLETE: CPT

## 2024-02-05 PROCEDURE — 99232 SBSQ HOSP IP/OBS MODERATE 35: CPT | Performed by: INTERNAL MEDICINE

## 2024-02-05 RX ORDER — ACETAMINOPHEN 325 MG/1
975 TABLET ORAL EVERY 8 HOURS SCHEDULED
Qty: 30 TABLET | Refills: 0 | Status: SHIPPED | OUTPATIENT
Start: 2024-02-05

## 2024-02-05 RX ORDER — OXYCODONE HYDROCHLORIDE 5 MG/1
5 TABLET ORAL EVERY 6 HOURS PRN
Qty: 20 TABLET | Refills: 0 | Status: SHIPPED | OUTPATIENT
Start: 2024-02-05 | End: 2024-02-15

## 2024-02-05 RX ADMIN — Medication 500 MG: at 10:19

## 2024-02-05 RX ADMIN — ACETAMINOPHEN 975 MG: 325 TABLET, FILM COATED ORAL at 06:40

## 2024-02-05 RX ADMIN — RIVAROXABAN 20 MG: 20 TABLET, FILM COATED ORAL at 11:53

## 2024-02-05 RX ADMIN — HEPARIN SODIUM 5000 UNITS: 5000 INJECTION INTRAVENOUS; SUBCUTANEOUS at 06:40

## 2024-02-05 RX ADMIN — ACETAMINOPHEN 975 MG: 325 TABLET, FILM COATED ORAL at 14:26

## 2024-02-05 NOTE — PLAN OF CARE
Problem: INFECTION - ADULT  Goal: Absence or prevention of progression during hospitalization  Description: INTERVENTIONS:  - Assess and monitor for signs and symptoms of infection  - Monitor lab/diagnostic results  - Monitor all insertion sites, i.e. indwelling lines, tubes, and drains  - Monitor endotracheal if appropriate and nasal secretions for changes in amount and color  - [place] appropriate cooling/warming therapies per order  - Administer medications as ordered  - Instruct and encourage patient and family to use good hand hygiene technique  - Identify and instruct in appropriate isolation precautions for identified infection/condition  Outcome: Progressing     Problem: Knowledge Deficit  Goal: Patient/family/caregiver demonstrates understanding of disease process, treatment plan, medications, and discharge instructions  Description: Complete learning assessment and assess knowledge base.  Interventions:  - Provide teaching at level of understanding  - Provide teaching via preferred learning methods  Outcome: Progressing

## 2024-02-05 NOTE — PROGRESS NOTES
"Progress Note - General Surgery   [facility] Resident on Health system   Cricket Reyes 48 y.o. male MRN: [id number]  Unit/Bed#: NW8 861-02 Encounter: [id number]    Assessment:  48 y.o. M now POD 1 s/p laparoscopic cholecystectomy      Afebrile.VSS.  UOP 4x  AM labs pending    Plan:  Lo fat diet  PRN pain meds  PRN anti nausea meds  DVT prophylaxis  Out of bed and ambulating  Encourage IS  Patient can begin taking his xarelto again  Most likely discharge home today    Subjective/Objective     Subjective: No acute events overnight. Patient denies having nausea, vomiting, fevers, chills, chest pain, shortness of breath. Tolerating PO. No bowel movements but passing flatus. Voiding without difficulty.       Objective:     Blood pressure 146/73, pulse 74, temperature 98.2 °F (36.8 °C), resp. rate 16, height 5' 4\" (1.626 m), weight 96.2 kg (212 lb), SpO2 93%.,Body mass index is 36.39 kg/m².      Intake/Output Summary (Last 24 hours) at 2/5/2024 0542  Last data filed at 2/4/2024 2201  Gross per 24 hour   Intake 1500 ml   Output --   Net 1500 ml       Invasive Devices       Peripheral Intravenous Line  Duration             Peripheral IV 02/03/24 Dorsal (posterior);Right Forearm 1 day                    General: NAD  HENT: NCAT MMM  Neck: supple, no JVD  CV: nl rate  Lungs: nl wob. No resp distress  ABD: Soft, appropriately tender, nondistended. Incisions are clean/dry/intact.  Extrem: No CCE  Neuro: AAOx3       Scheduled Meds:  Current Facility-Administered Medications   Medication Dose Route Frequency Provider Last Rate    acetaminophen  975 mg Oral Q8H ED Dial MD      albuterol  2 puff Inhalation Q4H PRN Dinesh Davies DO      heparin (porcine)  5,000 Units Subcutaneous Q8H ED Dial MD      magnesium gluconate  500 mg Oral Daily Paul Dial MD      oxyCODONE  10 mg Oral Q6H PRN Paul Dial MD      oxyCODONE  5 mg Oral Q6H PRN Paul Dial MD       Continuous Infusions: "   PRN Meds:.  albuterol    oxyCODONE    oxyCODONE      Lab, Imaging and other studies:I have personally reviewed pertinent lab results.    VTE Pharmacologic Prophylaxis: Heparin  VTE Mechanical Prophylaxis: sequential compression device      Buck Chau MD  2/5/2024 5:42 AM

## 2024-02-05 NOTE — DISCHARGE SUMMARY
"  Discharge Summary - Cricket Reyes 48 y.o. male MRN: [id number]    Unit/Bed#: NW8 861-02 Encounter: [id number]    Admission Date:   Admission Orders (From admission, onward)       Ordered        02/03/24 1440  Inpatient Admission  Once                            Admitting Diagnosis: Acute cholecystitis [K81.0]  Abdominal pain [R10.9]    HPI: per residentANDI: \" Cricket Reyes is a 48 y.o. male who presents with abdominal pain.  Patient reports at 4 AM started having pain in epigastrium that radiates across all quadrants of his abdomen.  Patient reports severity of 9/10.  Patient for started experiencing symptoms like this 2 years ago which would resolve on its own.  Last week patient had an episode similar after eating chicken.  Patient took aspirin and Tums and pain resolved temporarily and once it came back patient was concerned went to the emergency department.  In the emergency department patient went into atrial flutter and was unstable and had to be cardioverted.  Patient has history of heart murmur since 11 years old that he believes is aortic stenosis.  Patient currently not complaining of nausea, no fevers chills chest pain or shortness of breath.  Patient denies any diarrhea.  Patient ate frozen pizza last night, and that was his last meal.  Patient received 1 dose of Eliquis on Monday and is soon to start Xarelto.  Patient denies allergies.  Patient surgical history of inguinal hernia repair when he was a child and right ACL repair. \"    Procedures Performed:   Orders Placed This Encounter   Procedures    POC Biliary US       Summary of Hospital Course: 47 y/o male admitted with abdominal pain, taken to OR for Lap Gunjan.  Did well post op and was sent home.  Will follow up in 2 weeks in Surgery Clinic.  For detail of stay, please refer to medical records.    Significant Findings, Care, Treatment and Services Provided: XR cholangiogram intraoperative    Result Date: 2/5/2024  Impression: Gallstones. " No common bile duct filling defects. Please see procedure report for further details. Workstation performed: [id number]     XR chest 2 views    Result Date: 2/4/2024  Impression: No acute cardiopulmonary disease. Workstation performed: [id number]     US right upper quadrant    Result Date: 2/3/2024  Impression: Gallbladder wall thickening with stones and pericholecystic fluid. Findings are suspicious for cholecystitis. This could be acute or chronic although haziness of the surrounding fat on CT favors acute.. This should be correlated with the acuity of the symptomatology. Of note, a sonographic Truong's was not elicited and the white blood cell count does appear normal. Hepatobiliary study may be useful in differentiating these possibilities. The study was marked in EPIC for immediate notification. Workstation performed: [id number]     CT abdomen pelvis with contrast    Result Date: 2/3/2024  Impression: Cholelithiasis with associated wall thickening and mild pericholecystic fluid concerning for acute cholecystitis. Ultrasound correlation can be considered for further assessment. New hypodense lesion involving the inferior pancreatic head and uncinate process compared to the prior 7/6/2009 examination. MRI of the abdomen with contrast is recommended on a nonemergent basis. Mild to moderate bladder wall thickening likely in part related to under distention. Correlate with clinical and laboratory parameters to exclude cystitis. I personally discussed this study with RUDOLPH FLORES on 2/3/2024 9:29 AM. Workstation performed: [id number]         Complications: none    Discharge Diagnosis: Cholelithasis                                         S/P Lap fawn    Medical Problems       Resolved Problems  Date Reviewed: 1/30/2024   None         Condition at Discharge: stable         Discharge instructions/Information to patient and family:   See after visit summary for information provided to patient and family.       Provisions for Follow-Up Care:  See after visit summary for information related to follow-up care and any pertinent home health orders.      PCP: Tushar Mahoney DO    Disposition: Home    Planned Readmission: No      Discharge Statement   I spent 30 minutes discharging the patient. This time was spent on the day of discharge. I had direct contact with the patient on the day of discharge. Additional documentation is required if more than 30 minutes were spent on discharge.     Discharge Medications:  See after visit summary for reconciled discharge medications provided to patient and family.

## 2024-02-05 NOTE — CASE MANAGEMENT
Case Management Discharge Planning Note    Patient name Cricket Reyes  Location 8 861/8 861-02 MRN [id number]  : 1975 Date 2024       Current Admission Date: 2/3/2024  Current Admission Diagnosis:Acute cholecystitis, Abdominal pain   There are no problems to display for this patient.     LOS (days): 2  Geometric Mean LOS (GMLOS) (days):   Days to GMLOS:     OBJECTIVE:  Risk of Unplanned Readmission Score: 8.88         Current admission status: Inpatient   Preferred Pharmacy:   [id number] [place], [place]  [address]  [facility] [address]  Phone: [phone] Fax: [phone]    Primary Care Provider: Tushar Mahoney DO    Primary Insurance: RAMÍREZ  Secondary Insurance:     DISCHARGE DETAILS:     CM called [facility] to do a price check for Xerelto. Pharmacist at [facility] told CM that a 90 day prescription was not covered by pt's insurance and would be $2,169.90 out of pocket. Pharmacist did inform CM that insurance does cover a 30 day supply for an out of pocket cost of $35. CM to follow.

## 2024-02-05 NOTE — DISCHARGE INSTR - AVS FIRST PAGE
Laparoscopic Cholecystectomy    WHAT YOU SHOULD KNOW:    Cholecystitis is inflammation of your gallbladder. Your gallbladder stores bile, which helps break down the fat that you eat. It also helps remove certain chemicals from your body. You may have a sudden, severe attack (acute cholecystitis) or several mild attacks (chronic cholecystitis).  Laparoscopic cholecystectomy is surgery to remove your gallbladder.  During this surgery, small incisions are made in your abdomen. A small scope and special tools are inserted through these incisions. Your gallbladder is removed from it normal location and taken out of your abdomen.  The incisions are closed with sutures and a small amount of glue is applied over top incisions to help reinforce the incisions to optimize healing.         AFTER YOU LEAVE:  Following discharge from the hospital, you may have some questions about your procedure, your activities or your general condition.  These instructions may answer some of your questions and help you adjust during the first few days following your operation.  You can expect to be sore and tender mostly around the incisions. This pain should last approximally 5 days and gradually improve daily.          Incisions:Your doctor may have chosen to use a type of adhesive glue, to close your incision.The glue is used to cover the incision, assist in closure, and prevent contamination so to optimize healing. This adhesive glue will darken and may appear as a purple film over the incision.  Do not pick at the glue, it will peel away on its own within one to two weeks.    You may apply ice to the incisions to help with pain. Avoid heat as this my make the glue tacky   It is normal to have some bruising, swelling or mild discoloration around the incision.  If increasing redness or pain develops, call our office immediately.   You may wash the incision gently with soap and water then pat dry.    Do not apply any creams or ointments.      Dressings: You do not usually need to keep incisions covered with a dressing.  A dressing is only required if you had a drain following your surgery and the drain was removed prior to discharge.  If a dressing is required the doctor will discuss the dressing with prior to leaving. You may remove the dressing for showering, but reapply when dry.     Bathing: You may shower daily with soap and water the day after the procedure. It is OK to GENTLY wash the incision with soap and water then pat dry.  Do NOT soak incision in a tub, pool, or hot tub for 2 weeks.    Diet: Eat low-fat foods for 4 to 6 weeks while your body learns to digest fat without a gallbladder. Slowly increase the amount of fat that you eat. We recommend you slowly advance your diet. Try to start with softer bland foods and gradually advance as tolerated.   Be sure to consume plenty of water.  Avoid alcohol.        Activity/Restrictions: The evening following the procedure you should rest as much as possible, sitting, lying or reclining.  you should be sure someone remains with you until the next morning.  Gradually increase your activity daily. Walking 3-4 daily is good and  stairs are ok.  Listen to your body.  If you start to get tired or sore then rest.   No strenuous activity or exercise for 3-4 weeks.   No driving for 5 days or while taking narcotics for pain.      Return or work: You may return to work or other activities as soon as your pain is controlled and you feel comfortable. For many people, this is 5 to 7 days after surgery. If your job requires heavy lifting you will need to be on light duty for 2-3 weeks.      Follow up appointment: following discharge from the hospital call the office in 1-2 days to set up a post operative appointment to be seen in 2-3 weeks.  The phone number and address should be provided in your discharge paper work.    Medication: Please take all medications as prescribed. Call your healthcare provider if you  think your medicine is not helping or if you have side effects.   If you were given a prescription for Percocet, Norco, or Vicodin for pain be sure to eat prior to taking as these medications as they may cause nausea and vomiting on an empty stomach.    DO NOT take Tylenol with these medication for a fever or for further pain control as these medications already contain Tylenol in them.      Contact your healthcare provider if:   You have a fever over 101°F (38°C) or chills.    You have pain or nausea that is not relieved by medicine.    You have redness and swelling around your incisions, or blood or pus is leaking from your incisions.    You are constipated or have diarrhea.    Your skin or eyes are yellow, or your bowel movements are pale.    You have questions or concerns about your surgery, condition, or care.  Seek care immediately or call 911 if:   You cannot stop vomiting.    Your bowel movements are black or bloody.    You have pain in your abdomen and it is swollen or hard.    Your arm or leg feels warm, tender, and painful. It may look swollen and red.   You feel lightheaded, short of breath, and have chest pain.    You cough up blood.

## 2024-02-05 NOTE — PROGRESS NOTES
"General Cardiology   Progress Note -  Team One   Cricket Reyes 48 y.o. male MRN: [id number]    Unit/Bed#: NW8 861-02 Encounter: [id number]    Assessment:    1. Acute cholecystitis  - as evidence on abdominal ultrasound + CT a/p  - s/p laparoscopic cholecystectomy  - management per general surgery     2. Atrial flutter  - s/p cardioversion in ED (1/29/2024) - new diagnosis  - presenting ECG - SB, HR 57, T wave abnormality in inferior leads  - not on telemetry  - recently prescribed Eliquis 5 mg BID which which he did not start due to difficulties with obtaining prescription  - maintaining NSR per exam      Plan/Recommendations:  Follow-up on echocardiogram to evaluate heart function and valvular status  Xarelto price evaluated by   Surgery cleared for patient to restart anticoagulation therefore will order Xarelto 20 mg daily    _____________________________________________________________________    Subjective    Patient seen and examined.  No acute events overnight.    Review of Systems   Constitutional: Negative. Negative for chills.   Cardiovascular:  Negative for chest pain, dyspnea on exertion, leg swelling, near-syncope, orthopnea, palpitations, paroxysmal nocturnal dyspnea and syncope.   Respiratory: Negative.  Negative for cough, shortness of breath and wheezing.    Endocrine: Negative.    Hematologic/Lymphatic: Negative.    Skin: Negative.    Musculoskeletal: Negative.    Gastrointestinal: Negative.  Negative for diarrhea, nausea and vomiting.   Neurological:  Negative for dizziness, light-headedness and weakness.   Psychiatric/Behavioral: Negative.  Negative for altered mental status.    All other systems reviewed and are negative.      Objective:   Vitals: Blood pressure 133/56, pulse 72, temperature 98 °F (36.7 °C), temperature source Oral, resp. rate 16, height 5' 4\" (1.626 m), weight 96.2 kg (212 lb), SpO2 93%.,     Wt Readings from Last 3 Encounters:   02/03/24 96.2 kg (212 lb)   01/30/24 96.4 kg " (212 lb 9.6 oz)   24 92.8 kg (204 lb 9.4 oz)        Lab Results   Component Value Date    CREATININE 1.14 2024    CREATININE 1.02 2024    CREATININE 1.07 2024         Body mass index is 36.39 kg/m².,     Systolic (24hrs), Av , Min:133 , Max:176     Diastolic (24hrs), Av, Min:56, Max:90          Intake/Output Summary (Last 24 hours) at 2024 0945  Last data filed at 2024 0701  Gross per 24 hour   Intake 1980 ml   Output --   Net 1980 ml     Weight (last 2 days)       Date/Time Weight    24 0701 96.2 (212)              Telemetry Review: Not on telemetry      Physical Exam  Vitals and nursing note reviewed.   Constitutional:       General: He is not in acute distress.     Appearance: He is well-developed.   HENT:      Head: Normocephalic and atraumatic.   Neck:      Vascular: No JVD.   Cardiovascular:      Rate and Rhythm: Normal rate and regular rhythm.      Heart sounds: Normal heart sounds. No murmur heard.     No friction rub. No gallop.   Pulmonary:      Effort: Pulmonary effort is normal. No respiratory distress.      Breath sounds: Normal breath sounds. No wheezing or rales.   Chest:      Chest wall: No tenderness.   Abdominal:      General: Bowel sounds are normal. There is no distension.      Palpations: Abdomen is soft.      Tenderness: There is no abdominal tenderness.   Musculoskeletal:         General: No tenderness. Normal range of motion.      Cervical back: Normal range of motion and neck supple.      Right lower leg: No edema.      Left lower leg: No edema.   Skin:     General: Skin is warm and dry.      Coloration: Skin is not pale.      Findings: No erythema.   Neurological:      Mental Status: He is alert and oriented to person, place, and time.   Psychiatric:         Mood and Affect: Mood normal.         Behavior: Behavior normal.         Thought Content: Thought content normal.         Judgment: Judgment normal.         LABORATORY RESULTS      CBC with  "diff:   Results from last 7 days   Lab Units 02/05/24  0550 02/04/24  1134 02/04/24  0533 02/03/24  0801 01/29/24  1235   WBC Thousand/uL 8.98  --  6.33 7.89 14.53*   HEMOGLOBIN g/dL 13.5  --  13.2 13.8 16.0   HEMATOCRIT % 39.4  --  37.6 39.0 46.9   MCV fL 93  --  91 89 90   PLATELETS Thousands/uL 333 283 277 307 290   RBC Million/uL 4.23  --  4.15 4.38 5.19   MCH pg 31.9  --  31.8 31.5 30.8   MCHC g/dL 34.3  --  35.1 35.4 34.1   RDW % 13.0  --  13.0 12.9 13.2   MPV fL 10.4 10.1 10.0 10.5 11.3   NRBC AUTO /100 WBCs 0  --  0 0 0       CMP:  Results from last 7 days   Lab Units 02/05/24  0550 02/04/24  0533 02/03/24  0801 01/29/24  1235   POTASSIUM mmol/L 4.6 4.0 4.0 3.9   CHLORIDE mmol/L 103 107 103 98   CO2 mmol/L 22 22 22 27   BUN mg/dL 12 9 15 16   CREATININE mg/dL 1.14 1.02 1.07 1.45*   CALCIUM mg/dL 8.6 8.5 8.7 9.9   AST U/L 42* 25 20 18   ALT U/L 52 32 32 21   ALK PHOS U/L 54 52 52 60   EGFR ml/min/1.73sq m 75 85 81 56       BMP:  Results from last 7 days   Lab Units 02/05/24  0550 02/04/24  0533 02/03/24  0801 01/29/24  1235   POTASSIUM mmol/L 4.6 4.0 4.0 3.9   CHLORIDE mmol/L 103 107 103 98   CO2 mmol/L 22 22 22 27   BUN mg/dL 12 9 15 16   CREATININE mg/dL 1.14 1.02 1.07 1.45*   CALCIUM mg/dL 8.6 8.5 8.7 9.9       No results found for: \"NTBNP\"          Results from last 7 days   Lab Units 02/04/24  0533 01/29/24  1235   MAGNESIUM mg/dL 2.2 1.7*          Results from last 7 days   Lab Units 02/04/24  0533   HEMOGLOBIN A1C % 5.4          Results from last 7 days   Lab Units 01/29/24  1235   TSH 3RD GENERATON uIU/mL 2.613             Lipid Profile:   No results found for: \"CHOL\"  Lab Results   Component Value Date    HDL 27 (L) 02/04/2024     Lab Results   Component Value Date    LDLCALC 83 02/04/2024     Lab Results   Component Value Date    TRIG 117 02/04/2024       Cardiac testing:   No results found for this or any previous visit.    No results found for this or any previous visit.    No results found for " this or any previous visit.    No valid procedures specified.  No results found for this or any previous visit.        Meds/Allergies   all current active meds have been reviewed, current meds:   Current Facility-Administered Medications   Medication Dose Route Frequency    acetaminophen (TYLENOL) tablet 975 mg  975 mg Oral Q8H ED    albuterol (PROVENTIL HFA,VENTOLIN HFA) inhaler 2 puff  2 puff Inhalation Q4H PRN    heparin (porcine) subcutaneous injection 5,000 Units  5,000 Units Subcutaneous Q8H ED    magnesium gluconate (MAGONATE) tablet 500 mg  500 mg Oral Daily    oxyCODONE (ROXICODONE) immediate release tablet 10 mg  10 mg Oral Q6H PRN    oxyCODONE (ROXICODONE) IR tablet 5 mg  5 mg Oral Q6H PRN   , and PTA meds:   Prior to Admission Medications   Prescriptions Last Dose Informant Patient Reported? Taking?   magnesium gluconate (MAGONATE) 500 mg tablet   No No   Sig: Take 1 tablet (500 mg total) by mouth in the morning   rivaroxaban (XARELTO) 20 mg tablet   No No   Sig: Take 1 tablet (20 mg total) by mouth daily with breakfast      Facility-Administered Medications: None     Medications Prior to Admission   Medication    magnesium gluconate (MAGONATE) 500 mg tablet    rivaroxaban (XARELTO) 20 mg tablet            Counseling / Coordination of Care  Total floor / unit time spent today 20 minutes.  Greater than 50% of total time was spent with the patient and / or family counseling and / or coordination of care.      ** Please Note: Dragon 360 Dictation voice to text software may have been used in the creation of this document. **

## 2024-02-05 NOTE — QUICK NOTE
Post-Op Check    Assessment: 48 y.o. M s/p laparoscopic cholecystectomy     Plan:  Lo fat diet  PRN analgesia  DVT PPX  OOB/ambulate  Possible discharge in the morning    Subjective:  No acute events since surgery, tolerating liquids without nausea or vomiting, passing flatus but denies BM, pain controlled    Vitals:    02/04/24 1700   BP: 146/73   Pulse: 74   Resp: 16   Temp: 98.2 °F (36.8 °C)   SpO2: 93%       PE:  Gen: NAD, AAOx3  CV: RRR  Pulm: no resp distress  Abd: Soft, non-distended, appropriately tender, incisions c/d/i    Sam Baer MD  General Surgery, PGY4

## 2024-02-05 NOTE — UTILIZATION REVIEW
Initial Clinical Review    Admission: Date/Time/Statement:   Admission Orders (From admission, onward)       Ordered        02/03/24 1440  Inpatient Admission  Once                          Orders Placed This Encounter   Procedures    Inpatient Admission     Standing Status:   Standing     Number of Occurrences:   1     Order Specific Question:   Level of Care     Answer:   Med Surg [16]     Order Specific Question:   Estimated length of stay     Answer:   Not Applicable     ED Arrival Information       Expected   -    Arrival   2/3/2024 06:57    Acuity   Urgent              Means of arrival   Walk-In    Escorted by   Self    Service   Surgery-General    Admission type   Emergency              Arrival complaint   ABD pain             Chief Complaint   Patient presents with    Abdominal Pain     Pt reports abd pain that started at 4am. Pt reports last time these symptoms happened had to call EMS and came her with SOB,N/V, and abd pain. Pt reports unknown why this started and has echo scheduled for 2/13.       Initial Presentation: 48 y.o. male w/ PMH of Afib presented to the ED from home w/  abdominal pain that started at 4 am. Described pain in the epigastrium that radiates across all quadrants of his abdomen, 9/10 in severity. Reports pain resolved temporarily w/ Tums and aspirin but returned and went to the ED.  In the ED, he went into  atrial flutter and was unstable and had to be cardioverted.  he  received 1 dose of Eliquis on Monday and is soon to start Xarelto.     CT  scan shows Cholelithiasis with associated wall thickening and mild pericholecystic fluid.    On exam, RUQ pain, tenderness in the epigastrium present.  Given po sucralfate, protonix, IV Ancef, po flagyl.    Admit as Inpatient for evaluation and treatment of  acute cholecystitis.   Plan: Right upper quadrant ultrasound to rule out cholecystitis. If positive RUQ US, Cardiology consult for cardiac risk assessment. NPO. IV fluids. Pain/nausea  PRN    Date: 02/04   Day 2:   Gen Surg Notes: No acute ovn events. Pain resolved at 9pm, passing flatus. RUQ US shows gallbladder wall thickening with stones and pericholecystic fluid. Concerns for acute cholecystitis.   Plan: Plan for laparoscopic cholecystectomy. Cardiology consultation for cardiac risk assessment. -NPO. IV fluids. IV antibiotics. Pain/ nausea control PRN. OOB/ ambulation. Incentive Spirometry     OP Note:   SURGERY DATE: 2/4/2024   Procedure(s):  CHOLECYSTECTOMY LAPAROSCOPIC. intra op cholangiogram  Anesthesia Type: General  Operative Findings:  Acute Cholecystitis, Large Gallstones   Intra-operative cholangiogram confirmation of ductal anatomy      ED Triage Vitals [02/03/24 0701]   Temperature Pulse Respirations Blood Pressure SpO2   (!) 97.3 °F (36.3 °C) 64 18 170/76 100 %      Temp Source Heart Rate Source Patient Position - Orthostatic VS BP Location FiO2 (%)   Oral Monitor Sitting Left arm --      Pain Score       9          Wt Readings from Last 1 Encounters:   02/03/24 96.2 kg (212 lb)     Additional Vital Signs:   Date/Time Temp Pulse Resp BP MAP (mmHg) SpO2 Calculated FIO2 (%) - Nasal Cannula Nasal Cannula O2 Flow Rate (L/min) O2 Device Cardiac (WDL) Patient Position - Orthostatic VS   02/04/24 1700 98.2 °F (36.8 °C) 74 16 146/73 96 93 % -- -- None (Room air) WDL --   02/04/24 1648 -- 84 21 152/82 94 97 % -- -- Nasal cannula WDL --   02/04/24 1630 -- 98 18 176/90 Abnormal  106 94 % -- -- None (Room air) WDL --   02/04/24 1623 97.8 °F (36.6 °C) 91 18 157/79 107 93 % 28 2 L/min Nasal cannula WDL --   02/04/24 0936 97.9 °F (36.6 °C) 78 16 113/63 -- 97 % -- -- None (Room air) -- Lying   02/03/24 2200 97.9 °F (36.6 °C) 59 18 133/93 108 -- -- -- -- -- Sitting   02/03/24 1700 -- -- -- -- -- -- -- -- None (Room air) -- --   02/03/24 1653 98.1 °F (36.7 °C) 73 18 143/66 95 -- -- -- -- -- Sitting   02/03/24 1621 -- 62 19 142/66 -- 98 % -- -- None (Room air) -- --   02/03/24 1415 -- 69 17 -- -- 98  % -- -- -- -- --   02/03/24 1330 -- 61 18 -- -- 98 % -- -- None (Room air) -- --   02/03/24 1300 -- 61 19 -- -- 98 % -- -- -- -- --       Pertinent Labs/Diagnostic Test Results:   XR cholangiogram intraoperative   Final Result by Shashi Hernandez MD (02/05 0725)      Gallstones. No common bile duct filling defects. Please see procedure report for further details.            Workstation performed: [id number]         US right upper quadrant   Final Result by Sarthak Salazar MD (02/03 1821)      Gallbladder wall thickening with stones and pericholecystic fluid. Findings are suspicious for cholecystitis. This could be acute or chronic although haziness of the surrounding fat on CT favors acute.. This should be correlated with the acuity of the    symptomatology.      Of note, a sonographic Truong's was not elicited and the white blood cell count does appear normal. Hepatobiliary study may be useful in differentiating these possibilities.      The study was marked in EPIC for immediate notification.      Workstation performed: [id number]         CT abdomen pelvis with contrast   Final Result by Dennis Noonan MD (02/03 0930)      Cholelithiasis with associated wall thickening and mild pericholecystic fluid concerning for acute cholecystitis. Ultrasound correlation can be considered for further assessment.      New hypodense lesion involving the inferior pancreatic head and uncinate process compared to the prior 7/6/2009 examination. MRI of the abdomen with contrast is recommended on a nonemergent basis.      Mild to moderate bladder wall thickening likely in part related to under distention. Correlate with clinical and laboratory parameters to exclude cystitis.         I personally discussed this study with RUDOLPH LAZARO on 2/3/2024 9:29 AM.                              Workstation performed: [id number]         XR chest 2 views   ED Interpretation by Rudolph Lazaro DO (02/03 0855)   No acute  cardiopulmonary disease on my interpretation      Final Result by Silvia Medina MD (02/04 0755)      No acute cardiopulmonary disease.            Workstation performed: [id number]           02/03 EKG result:Sinus bradycardia  Non-specific intra-ventricular conduction block  Lateral infarct (cited on or before 29-JAN-2024)  T wave abnormality, consider inferior ischemia    02/04 echo- p        Results from last 7 days   Lab Units 02/05/24  0550 02/04/24  1134 02/04/24  0533 02/03/24  0801 01/29/24  1235   WBC Thousand/uL 8.98  --  6.33 7.89 14.53*   HEMOGLOBIN g/dL 13.5  --  13.2 13.8 16.0   HEMATOCRIT % 39.4  --  37.6 39.0 46.9   PLATELETS Thousands/uL 333 283 277 307 290   NEUTROS ABS Thousands/µL 7.12  --  3.52 5.52 10.80*         Results from last 7 days   Lab Units 02/05/24  0550 02/04/24  0533 02/03/24  0801 01/29/24  1235   SODIUM mmol/L 137 138 134* 136   POTASSIUM mmol/L 4.6 4.0 4.0 3.9   CHLORIDE mmol/L 103 107 103 98   CO2 mmol/L 22 22 22 27   ANION GAP mmol/L 12 9 9 11   BUN mg/dL 12 9 15 16   CREATININE mg/dL 1.14 1.02 1.07 1.45*   EGFR ml/min/1.73sq m 75 85 81 56   CALCIUM mg/dL 8.6 8.5 8.7 9.9   MAGNESIUM mg/dL  --  2.2  --  1.7*   PHOSPHORUS mg/dL  --  2.9  --   --      Results from last 7 days   Lab Units 02/05/24  0550 02/04/24  0533 02/03/24  0801 01/29/24  1235   AST U/L 42* 25 20 18   ALT U/L 52 32 32 21   ALK PHOS U/L 54 52 52 60   TOTAL PROTEIN g/dL 6.9 6.8 7.5 8.2   ALBUMIN g/dL 3.9 3.9 4.2 4.8   TOTAL BILIRUBIN mg/dL 0.72 0.90 0.52 1.32*         Results from last 7 days   Lab Units 02/05/24  0550 02/04/24  0533 02/03/24  0801 01/29/24  1235   GLUCOSE RANDOM mg/dL 113 98 114 123         Results from last 7 days   Lab Units 02/04/24  0533   HEMOGLOBIN A1C % 5.4   EAG mg/dl 108         Results from last 7 days   Lab Units 02/03/24  1044 02/03/24  0801 01/29/24  1453 01/29/24  1235   HS TNI 0HR ng/L  --  42  --  91*   HS TNI 2HR ng/L 45  --  110*  --    HSTNI D2 ng/L 3  --  19  --               Results from last 7 days   Lab Units 01/29/24  1235   TSH 3RD GENERATON uIU/mL 2.613           Results from last 7 days   Lab Units 02/03/24  0801   LIPASE u/L 33           ED Treatment:   Medication Administration from 02/03/2024 0657 to 02/03/2024 1643         Date/Time Order Dose Route Action     02/03/2024 0806 EST sucralfate (CARAFATE) tablet 1 g 1 g Oral Given     02/03/2024 0806 EST famotidine (PEPCID) tablet 20 mg 20 mg Oral Given     02/03/2024 0806 EST enoxaparin (LOVENOX) subcutaneous injection 30 mg 30 mg Subcutaneous Given     02/03/2024 0859 EST iohexol (OMNIPAQUE) 350 MG/ML injection (MULTI-DOSE) 100 mL 100 mL Intravenous Given     02/03/2024 1026 EST ceFAZolin (ANCEF) IVPB (premix in dextrose) 2,000 mg 50 mL 0 mg Intravenous Stopped     02/03/2024 0956 EST ceFAZolin (ANCEF) IVPB (premix in dextrose) 2,000 mg 50 mL 2,000 mg Intravenous New Bag     02/03/2024 1028 EST metroNIDAZOLE (FLAGYL) tablet 500 mg 500 mg Oral Given     02/03/2024 1625 EST multi-electrolyte (PLASMALYTE-A/ISOLYTE-S PH 7.4) IV solution 125 mL/hr Intravenous New Bag          Past Medical History:   Diagnosis Date    Asthma     Heart murmur      Present on Admission:  **None**      Admitting Diagnosis: Acute cholecystitis [K81.0]  Abdominal pain [R10.9]  Age/Sex: 48 y.o. male  Admission Orders:  SCD  I/O      Scheduled Medications:  acetaminophen, 975 mg, Oral, Q8H ED  heparin (porcine), 5,000 Units, Subcutaneous, Q8H ED  magnesium gluconate, 500 mg, Oral, Daily  metroNIDAZOLE (FLAGYL) IVPB (premix) 500 mg 100 mL  Dose: 500 mg  Freq: Every 8 hours Route: IV  Last Dose: 500 mg (02/04/24 0957)  Start: 02/04/24 0815 End: 02/04/24 1610     Continuous IV Infusions:  multi-electrolyte (PLASMALYTE-A/ISOLYTE-S PH 7.4) IV solution  Rate: 75 mL/hr Dose: 75 mL/hr  Freq: Continuous Route: IV  Indications of Use: IV Hydration  Last Dose: Stopped (02/04/24 2200)  Start: 02/03/24 1445 End: 02/04/24 2200      PRN Meds:  albuterol, 2 puff,  Inhalation, Q4H PRN  oxyCODONE, 10 mg, Oral, Q6H PRN 02/04 x 1  oxyCODONE, 5 mg, Oral, Q6H PRN        IP CONSULT TO CARDIOLOGY  IP CONSULT TO CASE MANAGEMENT    Network Utilization Review Department  ATTENTION: Please call with any questions or concerns to [phone] and carefully listen to the prompts so that you are directed to the right person. All voicemails are confidential.   For Discharge needs, contact Care Management DC Support Team at [phone] opt. 2  Send all requests for admission clinical reviews, approved or denied determinations and any other requests to dedicated fax number below belonging to the campus where the patient is receiving treatment. List of dedicated fax numbers for the Facilities:  FACILITY NAME UR FAX NUMBER   ADMISSION DENIALS (Administrative/Medical Necessity) [phone]   DISCHARGE SUPPORT TEAM (NETWORK) [phone]   PARENT CHILD HEALTH (Maternity/NICU/Pediatrics) [phone]   [facility] [phone]   [facility] [phone]   [facility] [phone]   [facility] [phone]   [facility] [phone]   [facility] [phone]   [facility] [phone]   [facility] [phone]   [facility] [phone]   [facility] [phone]   [facility] [phone]   [facility] [phone]

## 2024-02-05 NOTE — CASE MANAGEMENT
Case Management Assessment & Discharge Planning Note    Patient name Cricket Reyes  Location 8 861/8 [id number] MRN [id number]  : 1975 Date 2024       Current Admission Date: 2/3/2024  Current Admission Diagnosis:Acute cholecystitis, Abdominal pain   There are no problems to display for this patient.     LOS (days): 2  Geometric Mean LOS (GMLOS) (days):   Days to GMLOS:     OBJECTIVE:    Risk of Unplanned Readmission Score: 8.88         Current admission status: Inpatient       Preferred Pharmacy:   [id number] - [place], [place] - [address]   [address]   [facility] [address]  Phone: [phone] Fax: [phone]    Primary Care Provider: Tushar Mahoney DO    Primary Insurance: RAMÍREZ  Secondary Insurance:     ASSESSMENT:  Active Health Care Proxies    There are no active Health Care Proxies on file.                 Readmission Root Cause  30 Day Readmission: No    Patient Information  Admitted from:: Home  Mental Status: Alert  During Assessment patient was accompanied by: Not accompanied during assessment  Assessment information provided by:: Patient  Primary Caregiver: Self  Support Systems: Self, Son  County of Residence: [place]  What [facility] do you live in?: [place]  Home entry access options. Select all that apply.: Stairs  Number of steps to enter home.: 10  Do the steps have railings?: Yes  Type of Current Residence: Apartment  Floor Level: 2  Upon entering residence, is there a bedroom on the main floor (no further steps)?: Yes  Upon entering residence, is there a bathroom on the main floor (no further steps)?: Yes  Living Arrangements: Lives w/ Son  Is patient a ?: No    Activities of Daily Living Prior to Admission  Functional Status: Independent  Completes ADLs independently?: Yes  Ambulates independently?: Yes  Does patient use assisted devices?: No  Does patient currently own DME?: No  Does patient have a history of Outpatient Therapy (PT/OT)?: Yes  Does the  patient have a history of Short-Term Rehab?: No  Does patient have a history of HHC?: No  Does patient currently have HHC?: No         Patient Information Continued  Income Source: Employed  Does patient have prescription coverage?: Yes  Does patient receive dialysis treatments?: No  Does patient have a history of substance abuse?: No  Does patient have a history of Mental Health Diagnosis?: No         Means of Transportation  Means of Transport to Appts:: Drives Self      Housing Stability: Low Risk  (2/5/2024)    Housing Stability Vital Sign     Unable to Pay for Housing in the Last Year: No     Number of Places Lived in the Last Year: 1     Unstable Housing in the Last Year: No   Food Insecurity: No Food Insecurity (2/5/2024)    Hunger Vital Sign     Worried About Running Out of Food in the Last Year: Never true     Ran Out of Food in the Last Year: Never true   Transportation Needs: No Transportation Needs (2/5/2024)    PRAPARE - Transportation     Lack of Transportation (Medical): No     Lack of Transportation (Non-Medical): No   Utilities: Not At Risk (2/5/2024)    [facility] Utilities     Threatened with loss of utilities: No       DISCHARGE DETAILS:    Discharge planning discussed with:: Patient at bedside.  Freedom of Choice: Yes  Comments - Freedom of Choice: No rehab needs at this time.  CM contacted family/caregiver?: No- see comments (Pt independent and able to make own decisions.)  Were Treatment Team discharge recommendations reviewed with patient/caregiver?: Yes  Did patient/caregiver verbalize understanding of patient care needs?: Yes  Were patient/caregiver advised of the risks associated with not following Treatment Team discharge recommendations?: Yes    Contacts  Patient Contacts: Patient  Relationship to Patient:: Other (Comment) (Self)  Contact Method: In Person  Reason/Outcome: Continuity of Care, Discharge Planning    Requested Home Health Care         Is the patient interested in HHC at  discharge?: No    DME Referral Provided  Referral made for DME?: No    Other Referral/Resources/Interventions Provided:  Referral Comments: No rehab needs. Cardiology changed Xarelto from 90 day script to 30 day script. Pt will have copay of 35 dollars for 30 day script.         Treatment Team Recommendation: Home  Discharge Destination Plan:: Home  Transport at Discharge : Family

## 2024-02-05 NOTE — UTILIZATION REVIEW
NOTIFICATION OF INPATIENT ADMISSION   AUTHORIZATION REQUEST   SERVICING FACILITY:   [facility]  Address: [address] [place] [address]  Tax ID: [id number]  NPI: [id number] ATTENDING PROVIDER:  Attending Name and NPI#: Dinesh Davies Do [[id number]]  Address: [address] [place] [address]  Phone: [phone]   ADMISSION INFORMATION:  Place of Service: Inpatient [facility]  Place of Service Code: 21  Inpatient Admission Date/Time: 2/3/24  2:40 PM  Discharge Date/Time: No discharge date for patient encounter.  Admitting Diagnosis Code/Description:  Acute cholecystitis [K81.0]  Abdominal pain [R10.9]     UTILIZATION REVIEW CONTACT:  Rangel Amaya Utilization   Network Utilization Review Department  Phone: [phone]  Fax: [phone]  Email: [email or web address]  Contact for approvals/pending authorizations, clinical reviews, and discharge.     PHYSICIAN ADVISORY SERVICES:  Medical Necessity Denial & Eczj-id-Daix Review  Phone: [phone]  Fax: [phone]  Email: [email or web address]     DISCHARGE SUPPORT TEAM:  For Patients Discharge Needs & Updates  Phone: [phone] opt. 2 Fax: [phone]  Email: [email or web address]

## 2024-02-05 NOTE — PLAN OF CARE
Problem: PAIN - ADULT  Goal: Verbalizes/displays adequate comfort level or baseline comfort level  Description: Interventions:  - Encourage patient to monitor pain and request assistance  - Assess pain using appropriate pain scale  - Administer analgesics based on type and severity of pain and evaluate response  - Implement non-pharmacological measures as appropriate and evaluate response  - Consider cultural and social influences on pain and pain management  - Notify physician/advanced practitioner if interventions unsuccessful or patient reports new pain  Outcome: Progressing     Problem: INFECTION - ADULT  Goal: Absence or prevention of progression during hospitalization  Description: INTERVENTIONS:  - Assess and monitor for signs and symptoms of infection  - Monitor lab/diagnostic results  - Monitor all insertion sites, i.e. indwelling lines, tubes, and drains  - Monitor endotracheal if appropriate and nasal secretions for changes in amount and color  - Wayne appropriate cooling/warming therapies per order  - Administer medications as ordered  - Instruct and encourage patient and family to use good hand hygiene technique  - Identify and instruct in appropriate isolation precautions for identified infection/condition  Outcome: Progressing  Goal: Absence of fever/infection during neutropenic period  Description: INTERVENTIONS:  - Monitor WBC    Outcome: Progressing     Problem: SAFETY ADULT  Goal: Patient will remain free of falls  Description: INTERVENTIONS:  - Educate patient/family on patient safety including physical limitations  - Instruct patient to call for assistance with activity   - Consult OT/PT to assist with strengthening/mobility   - Keep Call bell within reach  - Keep bed low and locked with side rails adjusted as appropriate  - Keep care items and personal belongings within reach  - Initiate and maintain comfort rounds  - Make Fall Risk Sign visible to staff  - Apply yellow socks and bracelet  for high fall risk patients  - Consider moving patient to room near nurses station  Outcome: Progressing  Goal: Maintain or return to baseline ADL function  Description: INTERVENTIONS:  -  Assess patient's ability to carry out ADLs; assess patient's baseline for ADL function and identify physical deficits which impact ability to perform ADLs (bathing, care of mouth/teeth, toileting, grooming, dressing, etc.)  - Assess/evaluate cause of self-care deficits   - Assess range of motion  - Assess patient's mobility; develop plan if impaired  - Assess patient's need for assistive devices and provide as appropriate  - Encourage maximum independence but intervene and supervise when necessary  - Involve family in performance of ADLs  - Assess for home care needs following discharge   - Consider OT consult to assist with ADL evaluation and planning for discharge  - Provide patient education as appropriate  Outcome: Progressing  Goal: Maintains/Returns to pre admission functional level  Description: INTERVENTIONS:  - Perform AM-PAC 6 Click Basic Mobility/ Daily Activity assessment daily.  - Set and communicate daily mobility goal to care team and patient/family/caregiver.   - Collaborate with rehabilitation services on mobility goals if consulted  - Perform Range of Motion 4 times a day.  - Reposition patient every 2 hours.  - Dangle patient 4 times a day  - Stand patient 3 times a day  - Ambulate patient 3 times a day  - Out of bed to chair 3 times a day   - Out of bed for meals 3 times a day  - Out of bed for toileting  - Record patient progress and toleration of activity level   Outcome: Progressing     Problem: DISCHARGE PLANNING  Goal: Discharge to home or other facility with appropriate resources  Description: INTERVENTIONS:  - Identify barriers to discharge w/patient and caregiver  - Arrange for needed discharge resources and transportation as appropriate  - Identify discharge learning needs (meds, wound care, etc.)  -  Arrange for interpretive services to assist at discharge as needed  - Refer to Case Management Department for coordinating discharge planning if the patient needs post-hospital services based on physician/advanced practitioner order or complex needs related to functional status, cognitive ability, or social support system  Outcome: Progressing     Problem: Knowledge Deficit  Goal: Patient/family/caregiver demonstrates understanding of disease process, treatment plan, medications, and discharge instructions  Description: Complete learning assessment and assess knowledge base.  Interventions:  - Provide teaching at level of understanding  - Provide teaching via preferred learning methods  Outcome: Progressing      Statement Selected

## 2024-02-06 NOTE — UTILIZATION REVIEW
NOTIFICATION OF ADMISSION DISCHARGE   This is a Notification of Discharge from [facility]. Please be advised that this patient has been discharge from our facility. Below you will find the admission and discharge date and time including the patient’s disposition.   UTILIZATION REVIEW CONTACT:  Rangel Amaya  Utilization   Network Utilization Review Department  Phone: [phone] x carefully listen to the prompts. All voicemails are confidential.  Email: [email or web address]     ADMISSION INFORMATION  PRESENTATION DATE: 2/3/2024  7:04 AM  OBERVATION ADMISSION DATE:   INPATIENT ADMISSION DATE: 2/3/24  2:40 PM   DISCHARGE DATE: 2/5/2024  6:22 PM   DISPOSITION:Home/Self Care    Network Utilization Review Department  ATTENTION: Please call with any questions or concerns to [phone] and carefully listen to the prompts so that you are directed to the right person. All voicemails are confidential.   For Discharge needs, contact Care Management DC Support Team at [phone] opt. 2  Send all requests for admission clinical reviews, approved or denied determinations and any other requests to dedicated fax number below belonging to the campus where the patient is receiving treatment. List of dedicated fax numbers for the Facilities:  FACILITY NAME UR FAX NUMBER   ADMISSION DENIALS (Administrative/Medical Necessity) [phone]   DISCHARGE SUPPORT TEAM ([facility]) [phone]   PARENT CHILD HEALTH (Maternity/NICU/Pediatrics) [phone]   [facility] [phone]   [facility] [phone]   [facility] [phone]   [facility] [phone]   [facility] [phone]   [facility] [phone]   [facility] [phone]   [facility] [phone]   [facility] [[email or web address]]  [facility] [phone]   [facility] [phone]   [facility] [phone]   [facility] [phone]

## 2024-02-07 ENCOUNTER — TRANSITIONAL CARE MANAGEMENT (OUTPATIENT)
Dept: FAMILY MEDICINE CLINIC | Facility: CLINIC | Age: 49
End: 2024-02-07

## 2024-02-07 PROCEDURE — 88304 TISSUE EXAM BY PATHOLOGIST: CPT | Performed by: PATHOLOGY

## 2024-02-08 ENCOUNTER — TELEPHONE (OUTPATIENT)
Age: 49
End: 2024-02-08

## 2024-02-08 NOTE — TELEPHONE ENCOUNTER
Patient called and stated he had surgery on Monday.  He needs to get into the office tomorrow and would like to see if we can over book.  He has ppwk that needs to be filled out.    Please advise

## 2024-02-09 ENCOUNTER — OFFICE VISIT (OUTPATIENT)
Dept: FAMILY MEDICINE CLINIC | Facility: CLINIC | Age: 49
End: 2024-02-09

## 2024-02-09 VITALS
TEMPERATURE: 97.9 F | BODY MASS INDEX: 34.62 KG/M2 | OXYGEN SATURATION: 99 % | SYSTOLIC BLOOD PRESSURE: 128 MMHG | HEIGHT: 64 IN | HEART RATE: 125 BPM | RESPIRATION RATE: 18 BRPM | DIASTOLIC BLOOD PRESSURE: 78 MMHG | WEIGHT: 202.8 LBS

## 2024-02-09 DIAGNOSIS — Z76.89 ENCOUNTER FOR SUPPORT AND COORDINATION OF TRANSITION OF CARE: Primary | ICD-10-CM

## 2024-02-09 DIAGNOSIS — K81.0 ACUTE CHOLECYSTITIS: ICD-10-CM

## 2024-02-09 DIAGNOSIS — L27.0 DRUG RASH: ICD-10-CM

## 2024-02-09 DIAGNOSIS — L50.9 URTICARIA: ICD-10-CM

## 2024-02-09 RX ORDER — PREDNISONE 10 MG/1
TABLET ORAL
Qty: 20 TABLET | Refills: 0 | Status: SHIPPED | OUTPATIENT
Start: 2024-02-09

## 2024-02-09 RX ORDER — CETIRIZINE HYDROCHLORIDE 10 MG/1
10 TABLET ORAL DAILY
Qty: 30 TABLET | Refills: 0 | Status: SHIPPED | OUTPATIENT
Start: 2024-02-09

## 2024-02-09 NOTE — PROGRESS NOTES
Assessment & Plan       Rhythm is sinus tachy at present.  Hold Xarelto and Mg++ till rash clears.        Chief Complaint   Patient presents with    Transition of Care Management     2/3/24-2/5/24   Acute cholecystitis. S/P Laparoscopic Cholecystectomy    Rash      1. Encounter for support and coordination of transition of care    2. Acute cholecystitis    3. Drug rash  -     predniSONE 10 mg tablet; #4 po day one, #3 po day 2 and 3, #2 po day 4, 5, and 6 than one daily.    4. Urticaria  -     cetirizine (ZyrTEC) 10 mg tablet; Take 1 tablet (10 mg total) by mouth daily  -     predniSONE 10 mg tablet; #4 po day one, #3 po day 2 and 3, #2 po day 4, 5, and 6 than one daily.         Subjective     Transitional Care Management Review:   Cricket Reyes is a 48 y.o. male here for TCM follow up.     During the TCM phone call patient stated:  TCM Call       Date and time call was made  2/7/2024  3:49 PM    Patient was hospitialized at  [facility]    Date of Admission  02/03/24    Date of discharge  02/05/24    Diagnosis  Acute cholecystitis    Disposition  Home    Were the patients medications reviewed and updated  Yes          TCM Call       Scheduled for follow up?  Yes    I have advised the patient to call PCP with any new or worsening symptoms  Arvind Yost MA          TCM, acute fawn.  Doing Ok except for an itchy rash all over.  Rash started Monday when got home.  Given Abx in hospital, started Xarelto and Mg++ Monday.  Has appt with cardiology march 26 th.      Review of Systems   Constitutional: Negative.    HENT: Negative.     Eyes: Negative.    Respiratory: Negative.     Cardiovascular: Negative.    Gastrointestinal: Negative.    Genitourinary: Negative.    Musculoskeletal: Negative.    Skin:  Positive for rash.        Itching     Neurological: Negative.    Psychiatric/Behavioral: Negative.         Objective     /78 (BP Location: Left arm, Patient Position: Sitting, Cuff Size: Standard)   Pulse  "(!) 125   Temp 97.9 °F (36.6 °C) (Oral)   Resp 18   Ht 5' 4\" (1.626 m)   Wt 92 kg (202 lb 12.8 oz)   SpO2 99%   BMI 34.81 kg/m²      Physical Exam  Vitals and nursing note reviewed.   Constitutional:       General: He is not in acute distress.     Appearance: He is well-developed.   HENT:      Head: Normocephalic and atraumatic.      Right Ear: External ear normal.      Nose: Nose normal.      Mouth/Throat:      Mouth: Mucous membranes are moist.      Pharynx: Oropharynx is clear.   Eyes:      Conjunctiva/sclera: Conjunctivae normal.   Cardiovascular:      Rate and Rhythm: Regular rhythm. Tachycardia present.      Heart sounds: No murmur heard.  Pulmonary:      Effort: Pulmonary effort is normal. No respiratory distress.      Breath sounds: Normal breath sounds.   Abdominal:      General: Abdomen is flat. Bowel sounds are normal.      Palpations: Abdomen is soft.      Tenderness: There is no abdominal tenderness.   Musculoskeletal:         General: No swelling.      Cervical back: Neck supple.   Skin:     General: Skin is warm and dry.      Capillary Refill: Capillary refill takes less than 2 seconds.      Findings: Rash present.      Comments: Dispersed urticaria - elevated lesions.   Neurological:      Mental Status: He is alert and oriented to person, place, and time.   Psychiatric:         Mood and Affect: Mood normal.         Behavior: Behavior normal.         Thought Content: Thought content normal.         Judgment: Judgment normal.       Medications have been reviewed by provider in current encounter  No Known Allergies   Social History      Current Outpatient Medications:     acetaminophen (TYLENOL) 325 mg tablet, Take 3 tablets (975 mg total) by mouth every 8 (eight) hours, Disp: 30 tablet, Rfl: 0    cetirizine (ZyrTEC) 10 mg tablet, Take 1 tablet (10 mg total) by mouth daily, Disp: 30 tablet, Rfl: 0    magnesium gluconate (MAGONATE) 500 mg tablet, Take 1 tablet (500 mg total) by mouth in the " morning, Disp: 30 tablet, Rfl: 3    predniSONE 10 mg tablet, #4 po day one, #3 po day 2 and 3, #2 po day 4, 5, and 6 than one daily., Disp: 20 tablet, Rfl: 0    rivaroxaban (XARELTO) 20 mg tablet, Take 1 tablet (20 mg total) by mouth daily with breakfast, Disp: 90 tablet, Rfl: 3    oxyCODONE (ROXICODONE) 5 immediate release tablet, Take 1 tablet (5 mg total) by mouth every 6 (six) hours as needed for moderate pain or severe pain for up to 10 days Max Daily Amount: 20 mg (Patient not taking: Reported on 2/9/2024), Disp: 20 tablet, Rfl: 0   Tushar Mahoney DO

## 2024-02-12 ENCOUNTER — TELEPHONE (OUTPATIENT)
Age: 49
End: 2024-02-12

## 2024-02-14 ENCOUNTER — APPOINTMENT (OUTPATIENT)
Dept: LAB | Facility: CLINIC | Age: 49
End: 2024-02-14
Payer: COMMERCIAL

## 2024-02-14 DIAGNOSIS — Z12.5 PROSTATE CANCER SCREENING: ICD-10-CM

## 2024-02-14 DIAGNOSIS — E78.00 HYPERCHOLESTEREMIA: ICD-10-CM

## 2024-02-14 LAB
CHOLEST SERPL-MCNC: 145 MG/DL
PSA SERPL-MCNC: 0.47 NG/ML (ref 0–4)

## 2024-02-14 PROCEDURE — G0103 PSA SCREENING: HCPCS

## 2024-02-14 PROCEDURE — 36415 COLL VENOUS BLD VENIPUNCTURE: CPT

## 2024-02-14 PROCEDURE — 82465 ASSAY BLD/SERUM CHOLESTEROL: CPT

## 2024-02-15 ENCOUNTER — OFFICE VISIT (OUTPATIENT)
Dept: SURGERY | Facility: CLINIC | Age: 49
End: 2024-02-15

## 2024-02-15 VITALS
TEMPERATURE: 98.2 F | SYSTOLIC BLOOD PRESSURE: 133 MMHG | BODY MASS INDEX: 34.19 KG/M2 | WEIGHT: 199.2 LBS | DIASTOLIC BLOOD PRESSURE: 76 MMHG | HEART RATE: 71 BPM

## 2024-02-15 DIAGNOSIS — Z09 POSTOP CHECK: Primary | ICD-10-CM

## 2024-02-15 PROCEDURE — 99024 POSTOP FOLLOW-UP VISIT: CPT | Performed by: SURGERY

## 2024-02-15 NOTE — PROGRESS NOTES
GENERAL SURGERY POST-OP NOTE  Cricket Reyes   MRN: [id number]   : 1975  2/15/2024  Chief Complaint   Patient presents with    Post-op     Post op lap fawn patient has some concerns with diet      Assessment/Plan   Diagnoses and all orders for this visit:    Postop check    Patient is doing well status post-operative laparoscopic cholecystectomy.  Post-operative care restrictions discussed. May return to work in 2 weeks as he is required to lift more than 50 pounds.  Pathology reviewed  he is progressing nicely.  I will see him back on an as needed basis.    Subjective   The patient is a 48 y.o. male who presents for routine post-operative follow up status post open cholecystectomy.  He denies any present complaints. Activity level has been appropriate. He is tolerating a regular diet. Pain is well-controlled with current therapy.    The following portions of the patient's history were reviewed by a provider in this encounter and updated as appropriate:    No text in SmartText           Objective   Physical Exam:  /76   Pulse 71   Temp 98.2 °F (36.8 °C)   Wt 90.4 kg (199 lb 3.2 oz)   BMI 34.19 kg/m²   Constitutional: not in acute distress, well developed and well nourished  Abdomen: soft, bowel sounds active, non-tender, no abnormal masses  Incision: healing well, no drainage, no erythema, well approximated  Tenderness at incision site: mild    Current Outpatient Medications   Medication Sig Dispense Refill    acetaminophen (TYLENOL) 325 mg tablet Take 3 tablets (975 mg total) by mouth every 8 (eight) hours 30 tablet 0    cetirizine (ZyrTEC) 10 mg tablet Take 1 tablet (10 mg total) by mouth daily 30 tablet 0    magnesium gluconate (MAGONATE) 500 mg tablet Take 1 tablet (500 mg total) by mouth in the morning 30 tablet 3    oxyCODONE (ROXICODONE) 5 immediate release tablet Take 1 tablet (5 mg total) by mouth every 6 (six) hours as needed for moderate pain or severe pain for up to 10 days Max Daily  Amount: 20 mg (Patient not taking: Reported on 2/9/2024) 20 tablet 0    predniSONE 10 mg tablet #4 po day one, #3 po day 2 and 3, #2 po day 4, 5, and 6 than one daily. 20 tablet 0    rivaroxaban (XARELTO) 20 mg tablet Take 1 tablet (20 mg total) by mouth daily with breakfast 90 tablet 3     No current facility-administered medications for this visit.     Patient has no known allergies.   Past Medical History:   Diagnosis Date    Asthma     Heart murmur        Carlitos Peters DO

## 2024-02-25 LAB — COLOGUARD RESULT REPORTABLE: NEGATIVE

## 2024-03-23 PROBLEM — I48.92 PAROXYSMAL ATRIAL FLUTTER (HCC): Status: ACTIVE | Noted: 2024-03-23

## 2024-03-23 PROBLEM — Z51.81 ANTICOAGULATION MANAGEMENT ENCOUNTER: Status: ACTIVE | Noted: 2024-03-23

## 2024-03-23 PROBLEM — Z87.2: Status: ACTIVE | Noted: 2024-03-23

## 2024-03-23 PROBLEM — Z79.01 ANTICOAGULATION MANAGEMENT ENCOUNTER: Status: ACTIVE | Noted: 2024-03-23

## 2024-03-23 PROBLEM — K81.0 ACUTE CHOLECYSTITIS: Status: RESOLVED | Noted: 2024-02-05 | Resolved: 2024-03-23

## 2024-04-14 ENCOUNTER — APPOINTMENT (OUTPATIENT)
Dept: URGENT CARE | Age: 49
End: 2024-04-14

## 2024-10-01 ENCOUNTER — OFFICE VISIT (OUTPATIENT)
Dept: URGENT CARE | Age: 49
End: 2024-10-01
Payer: COMMERCIAL

## 2024-10-01 VITALS
HEART RATE: 99 BPM | RESPIRATION RATE: 18 BRPM | SYSTOLIC BLOOD PRESSURE: 150 MMHG | WEIGHT: 205 LBS | DIASTOLIC BLOOD PRESSURE: 81 MMHG | HEIGHT: 64 IN | BODY MASS INDEX: 35 KG/M2 | OXYGEN SATURATION: 98 % | TEMPERATURE: 97.9 F

## 2024-10-01 DIAGNOSIS — R07.89 CHEST WALL PAIN: Primary | ICD-10-CM

## 2024-10-01 PROCEDURE — 99213 OFFICE O/P EST LOW 20 MIN: CPT

## 2024-10-01 PROCEDURE — 93005 ELECTROCARDIOGRAM TRACING: CPT

## 2024-10-01 RX ORDER — METHOCARBAMOL 500 MG/1
500 TABLET, FILM COATED ORAL 3 TIMES DAILY
Qty: 21 TABLET | Refills: 0 | Status: SHIPPED | OUTPATIENT
Start: 2024-10-01 | End: 2024-10-02 | Stop reason: ALTCHOICE

## 2024-10-01 NOTE — PROGRESS NOTES
[facility]        NAME: Cricket Reyes is a 49 y.o. male  : 1975    MRN: [id number]  DATE: 2024  TIME: 7:00 PM    Assessment and Plan   Chest wall pain [R07.89]  1. Chest wall pain  methocarbamol (ROBAXIN) 500 mg tablet      EKG reviewed and compared to prior EKGs, occasional ectopic beats and PVCs noted which have been present previously. Patient strongly advised to continue to monitor symptoms and to go to emergency department or for any cardiac symptoms such as dizziness, palpitations, etc.   Chest x-ray offered in office, declined at this time.   Please begin muscle relaxer as directed.   May continue heat, lidocaine patches, Tylenol, Ibuprofen, etc.   Follow up with PCP if no relief within one week.       Patient Instructions   Patient Education     Muscle Spasm ED   General Information   You came to the Emergency Department (ED) for muscle spasms. A muscle spasm is a sudden, often painful, tightening of a muscle. This can involve part of a muscle, the whole muscle, or even a group of muscles. A muscle spasm is also called a muscle cramp and it can last for a few seconds or a few minutes. Most of the time, muscle spasms will go away without treatment.  What care is needed at home?   Call your regular doctor to let them know you were in the ED. Make a follow-up appointment if you were told to.  Gentle stretching should help stop a spasm. Often, you can ease the spasm just by stretching the muscle. Stretching exercises keep your muscles flexible. They also stop them from getting too tight. Do stretches slowly and hold each stretch for 20 to 30 seconds. Try to do the stretches you were shown 2 to 3 times each day.  Ice or heat may help ease your pain. Either one may help stop a spasm, but most people find that heat is more helpful.  Soak the sore area in warm water or using a heating pad can help stop the spasm and lower pain. Heat also helps muscles stretch easier. Do not leave a  heating pad on more than 20 minutes at a time. Be sure to check your skin while the heating pad is on to avoid burns. Never go to sleep with a heating pad on.  Putting ice on a muscle that is in spasm can help ease the spasm and reduce pain. Use an ice pack or bag of frozen vegetables wrapped in a towel over the painful part. Never put ice right on the skin. Do not leave the ice on more than 10 to 15 minutes at a time. Do not try to stretch the muscle right after icing.  Massaging the cramping muscle with firm pressure may help ease the spasm.  Drinking extra fluids can help muscle spasms if they are caused by a loss of body fluids. Avoid intense exercise in hot and humid weather to lower the chance of getting muscle spasms.  Sometimes, you may get muscle spasms if you don’t get enough of certain nutrients in your diet, like potassium, magnesium, or carbohydrates. If this is the case, changing your diet can help you to avoid muscle cramps. Talk to your doctor about what to eat and drink before and after exercise.  You may want to take medicine like ibuprofen, naproxen, or acetaminophen to help with pain.  When do I need to call the doctor?   You are having a lot of muscle spasms.  You continue to have muscle spasms and you feel extremely tired or have weakness in your arm or leg.  You are not able to get relief from a muscle spasm.  You have new or worsening symptoms.  Last Reviewed Date   2020-09-22  Consumer Information Use and Disclaimer   This generalized information is a limited summary of diagnosis, treatment, and/or medication information. It is not meant to be comprehensive and should be used as a tool to help the user understand and/or assess potential diagnostic and treatment options. It does NOT include all information about conditions, treatments, medications, side effects, or risks that may apply to a specific patient. It is not intended to be medical advice or a substitute for the medical advice,  diagnosis, or treatment of a health care provider based on the health care provider's examination and assessment of a patient’s specific and unique circumstances. Patients must speak with a health care provider for complete information about their health, medical questions, and treatment options, including any risks or benefits regarding use of medications. This information does not endorse any treatments or medications as safe, effective, or approved for treating a specific patient. [employer] and its affiliates disclaim any warranty or liability relating to this information or the use thereof. The use of this information is governed by the Terms of Use, available at https://www.CellPly.MyLorry/en/know/clinical-effectiveness-terms   Copyright   Copyright © 2024 [employer] and its affiliates and/or licensors. All rights reserved.       Follow up with PCP in 3-5 days.  Proceed to  ER if symptoms worsen.    Chief Complaint     Chief Complaint   Patient presents with    Spasms     Feels like a pulled muscle in rib cage. Sensitive to touch. Struggling to take a deep breath bc of the pain. Feeling the muscle twitching at times. Taking OTC medication. Pain present since this morning.          History of Present Illness       Patient is a 49 year old male with PMH significant for ventricular tachycardia, paroxysmal a.flutter, asthma, who presents for evaluation of left-sided chest wall pain which he describes as a muscle spasm. He reports that the pain began today and radiates from his sternum under his left breast. The pain is aggravated by deep inspiration and palpation. He does note that he recently recovered from a URI over the past week and thinks it may be from coughing. He denies radiation of pain into neck or down arm, palpitations, dizziness, nausea/vomiting, fever or back pain.         Review of Systems   Review of Systems   Constitutional:  Negative for fatigue and fever.   HENT:  Negative for  congestion, ear discharge, ear pain, postnasal drip, rhinorrhea, sinus pressure, sinus pain, sneezing and sore throat.    Eyes: Negative.  Negative for pain, discharge, redness and itching.   Respiratory: Negative.  Negative for apnea, cough, choking, chest tightness, shortness of breath, wheezing and stridor.    Cardiovascular:  Positive for chest pain. Negative for palpitations and leg swelling.   Gastrointestinal: Negative.  Negative for diarrhea, nausea and vomiting.   Endocrine: Negative.  Negative for polydipsia, polyphagia and polyuria.   Genitourinary: Negative.  Negative for decreased urine volume and flank pain.   Musculoskeletal: Negative.  Negative for arthralgias, back pain, myalgias, neck pain and neck stiffness.   Skin: Negative.  Negative for color change and rash.   Allergic/Immunologic: Negative.  Negative for environmental allergies.   Neurological: Negative.  Negative for dizziness, facial asymmetry, light-headedness, numbness and headaches.   Hematological: Negative.  Negative for adenopathy.   Psychiatric/Behavioral: Negative.           Current Medications       Current Outpatient Medications:     acetaminophen (TYLENOL) 325 mg tablet, Take 3 tablets (975 mg total) by mouth every 8 (eight) hours, Disp: 30 tablet, Rfl: 0    cetirizine (ZyrTEC) 10 mg tablet, Take 1 tablet (10 mg total) by mouth daily, Disp: 30 tablet, Rfl: 0    methocarbamol (ROBAXIN) 500 mg tablet, Take 1 tablet (500 mg total) by mouth 3 (three) times a day for 7 days, Disp: 21 tablet, Rfl: 0    magnesium gluconate (MAGONATE) 500 mg tablet, Take 1 tablet (500 mg total) by mouth in the morning (Patient not taking: Reported on 10/1/2024), Disp: 30 tablet, Rfl: 3    predniSONE 10 mg tablet, #4 po day one, #3 po day 2 and 3, #2 po day 4, 5, and 6 than one daily. (Patient not taking: Reported on 10/1/2024), Disp: 20 tablet, Rfl: 0    rivaroxaban (XARELTO) 20 mg tablet, Take 1 tablet (20 mg total) by mouth daily with breakfast  "(Patient not taking: Reported on 10/1/2024), Disp: 90 tablet, Rfl: 3    Current Allergies     Allergies as of 10/01/2024    (No Known Allergies)            The following portions of the patient's history were reviewed and updated as appropriate: allergies, current medications, past family history, past medical history, past social history, past surgical history and problem list.     Past Medical History:   Diagnosis Date    Asthma     Heart murmur        Past Surgical History:   Procedure Laterality Date    ARTHROSCOPIC REPAIR ACL Right     CHOLECYSTECTOMY LAPAROSCOPIC N/A 2/4/2024    Procedure: CHOLECYSTECTOMY LAPAROSCOPIC, intra op cholangiogram;  Surgeon: Siddharth White DO;  Location: [facility];  Service: General    HERNIA REPAIR         Family History   Problem Relation Age of Onset    Arthritis Father     LUDA disease Father     Cancer Sister     Epididymitis Daughter     Diabetes Paternal Aunt     Diabetes Paternal Uncle          Medications have been verified.        Objective   /81   Pulse 99   Temp 97.9 °F (36.6 °C)   Resp 18   Ht 5' 4\" (1.626 m)   Wt 93 kg (205 lb)   SpO2 98%   BMI 35.19 kg/m²        Physical Exam     Physical Exam  Vitals reviewed.   Constitutional:       General: He is not in acute distress.     Appearance: Normal appearance. He is not ill-appearing, toxic-appearing or diaphoretic.      Interventions: He is not intubated.  HENT:      Head: Normocephalic and atraumatic.      Right Ear: Tympanic membrane, ear canal and external ear normal. There is no impacted cerumen.      Left Ear: Tympanic membrane, ear canal and external ear normal. There is no impacted cerumen.      Nose: Nose normal. No congestion or rhinorrhea.      Mouth/Throat:      Mouth: Mucous membranes are moist.      Pharynx: Oropharynx is clear. Uvula midline. No pharyngeal swelling, oropharyngeal exudate, posterior oropharyngeal erythema or uvula swelling.      Tonsils: No tonsillar exudate or " tonsillar abscesses. 1+ on the right. 1+ on the left.   Eyes:      Extraocular Movements: Extraocular movements intact.      Conjunctiva/sclera: Conjunctivae normal.      Pupils: Pupils are equal, round, and reactive to light.   Cardiovascular:      Rate and Rhythm: Normal rate and regular rhythm.      Pulses: Normal pulses.      Heart sounds: Normal heart sounds, S1 normal and S2 normal. Heart sounds not distant. No murmur heard.     No friction rub. No gallop.   Pulmonary:      Effort: Pulmonary effort is normal. No tachypnea, bradypnea, accessory muscle usage, prolonged expiration, respiratory distress or retractions. He is not intubated.      Breath sounds: Normal breath sounds. No stridor, decreased air movement or transmitted upper airway sounds. No decreased breath sounds, wheezing, rhonchi or rales.   Chest:      Chest wall: Tenderness present. No mass, lacerations, deformity, swelling, crepitus or edema. There is no dullness to percussion.          Comments: (+) Reproducible chest wall tenderness along sternum and under left breast. No bony abnormality or step-off.   Musculoskeletal:         General: Normal range of motion.      Cervical back: Normal range of motion and neck supple. No rigidity or tenderness.   Lymphadenopathy:      Cervical: No cervical adenopathy.   Skin:     General: Skin is warm and dry.      Capillary Refill: Capillary refill takes less than 2 seconds.      Findings: No erythema.   Neurological:      General: No focal deficit present.      Mental Status: He is alert.   Psychiatric:         Mood and Affect: Mood normal.

## 2024-10-02 ENCOUNTER — OFFICE VISIT (OUTPATIENT)
Dept: FAMILY MEDICINE CLINIC | Facility: CLINIC | Age: 49
End: 2024-10-02
Payer: COMMERCIAL

## 2024-10-02 VITALS
WEIGHT: 208.2 LBS | HEIGHT: 64 IN | DIASTOLIC BLOOD PRESSURE: 72 MMHG | SYSTOLIC BLOOD PRESSURE: 136 MMHG | HEART RATE: 87 BPM | BODY MASS INDEX: 35.55 KG/M2 | OXYGEN SATURATION: 97 % | TEMPERATURE: 97.8 F

## 2024-10-02 DIAGNOSIS — S23.41XA RIB SPRAIN, INITIAL ENCOUNTER: ICD-10-CM

## 2024-10-02 DIAGNOSIS — R21 SKIN RASH: ICD-10-CM

## 2024-10-02 DIAGNOSIS — Z87.2 HISTORY OF INGROWN HAIR: ICD-10-CM

## 2024-10-02 DIAGNOSIS — Z86.79 HISTORY OF CARDIAC ARRHYTHMIA: ICD-10-CM

## 2024-10-02 DIAGNOSIS — M99.08 SEGMENTAL AND SOMATIC DYSFUNCTION OF RIB CAGE: ICD-10-CM

## 2024-10-02 DIAGNOSIS — R07.1 CHEST PAIN ON BREATHING: Primary | ICD-10-CM

## 2024-10-02 DIAGNOSIS — Z86.79 HISTORY OF ATRIAL FLUTTER: ICD-10-CM

## 2024-10-02 LAB
ATRIAL RATE: 76 BPM
P AXIS: 40 DEGREES
PR INTERVAL: 140 MS
QRS AXIS: 55 DEGREES
QRSD INTERVAL: 134 MS
QT INTERVAL: 404 MS
QTC INTERVAL: 454 MS
T WAVE AXIS: 214 DEGREES
VENTRICULAR RATE: 76 BPM

## 2024-10-02 PROCEDURE — 98925 OSTEOPATH MANJ 1-2 REGIONS: CPT | Performed by: FAMILY MEDICINE

## 2024-10-02 PROCEDURE — 93010 ELECTROCARDIOGRAM REPORT: CPT

## 2024-10-02 PROCEDURE — 99214 OFFICE O/P EST MOD 30 MIN: CPT | Performed by: FAMILY MEDICINE

## 2024-10-02 RX ORDER — DIPHENOXYLATE HYDROCHLORIDE AND ATROPINE SULFATE 2.5; .025 MG/1; MG/1
1 TABLET ORAL DAILY
COMMUNITY

## 2024-10-02 NOTE — PROGRESS NOTES
[facility]    Performed by: Tushar Mahoney DO  Authorized by: Tushar Mahoney DO  Universal Protocol:  Consent: Verbal consent obtained.  Consent given by: patient      Procedure Details:     Region evaluated and treated:  Ribs    Ribs details:     Examination Method:  Range of Motion, Contracture and Tenderness, Pain    Severity:  Mild    Treatment Method:  Muscle Energy Treatment and Myofascial Release Treatment    Response:  Improved - The somatic dysfunction is improved but not completely resolved.    Total Regions Treated:  1

## 2024-10-02 NOTE — PROGRESS NOTES
"Ambulatory Visit  Name: Cricket Reyes      : 1975      MRN: [id number]  Encounter Provider: Tushar Mahoney DO  Encounter Date: 10/2/2024   Encounter department: [facility]    Assessment & Plan  Chest pain on breathing         History of cardiac arrhythmia    Orders:    Holter monitor; Future    Ambulatory Referral to Cardiology; Future    Skin rash    Orders:    Ambulatory Referral to Dermatology; Future    History of atrial flutter    Orders:    Holter monitor; Future    Ambulatory Referral to Cardiology; Future    History of ingrown hair    Orders:    Ambulatory Referral to Dermatology; Future    Rib sprain, initial encounter         Segmental and somatic dysfunction of rib cage            History of Present Illness     Left anterior lower rib cage pain from coughing, deep breath hurts.  Weight  - problem getting end inspiration.  Hx of arrhythmia.          Review of Systems   Constitutional: Negative.    HENT: Negative.     Eyes: Negative.    Respiratory:  Positive for chest tightness and shortness of breath.    Cardiovascular:  Positive for chest pain.   Gastrointestinal: Negative.    Genitourinary: Negative.    Musculoskeletal: Negative.    Skin: Negative.    Neurological: Negative.    Psychiatric/Behavioral: Negative.             Objective     /72 (BP Location: Left arm, Patient Position: Sitting, Cuff Size: Large)   Pulse 87   Temp 97.8 °F (36.6 °C) (Temporal)   Ht 5' 4\" (1.626 m)   Wt 94.4 kg (208 lb 3.2 oz)   SpO2 97%   BMI 35.74 kg/m²     Physical Exam  Constitutional:       Appearance: He is well-developed.   HENT:      Head: Normocephalic and atraumatic.      Right Ear: Tympanic membrane, ear canal and external ear normal.      Left Ear: Tympanic membrane, ear canal and external ear normal.      Nose: Nose normal.      Mouth/Throat:      Mouth: Mucous membranes are moist.      Pharynx: Oropharynx is clear.   Eyes:      Conjunctiva/sclera: Conjunctivae normal.      " Pupils: Pupils are equal, round, and reactive to light.   Cardiovascular:      Rate and Rhythm: Normal rate and regular rhythm.      Heart sounds: Normal heart sounds.   Pulmonary:      Effort: Pulmonary effort is normal.      Breath sounds: Normal breath sounds.   Abdominal:      General: Bowel sounds are normal.      Palpations: Abdomen is soft.   Musculoskeletal:      Cervical back: Normal range of motion and neck supple.      Comments: Rib IS just below left breast - point tenderness.   Skin:     General: Skin is warm and dry.   Neurological:      Mental Status: He is alert and oriented to person, place, and time.      Deep Tendon Reflexes: Reflexes are normal and symmetric.   Psychiatric:         Mood and Affect: Mood normal.         Behavior: Behavior normal.         Thought Content: Thought content normal.         Judgment: Judgment normal.

## 2024-10-16 ENCOUNTER — OFFICE VISIT (OUTPATIENT)
Dept: CARDIOLOGY CLINIC | Facility: CLINIC | Age: 49
End: 2024-10-16
Payer: COMMERCIAL

## 2024-10-16 VITALS
SYSTOLIC BLOOD PRESSURE: 120 MMHG | HEART RATE: 80 BPM | BODY MASS INDEX: 36.01 KG/M2 | WEIGHT: 209.8 LBS | DIASTOLIC BLOOD PRESSURE: 60 MMHG

## 2024-10-16 DIAGNOSIS — I77.810 DILATED AORTIC ROOT (HCC): ICD-10-CM

## 2024-10-16 DIAGNOSIS — I51.7 BIATRIAL ENLARGEMENT: ICD-10-CM

## 2024-10-16 DIAGNOSIS — I42.9 CARDIOMYOPATHY, UNSPECIFIED TYPE (HCC): ICD-10-CM

## 2024-10-16 DIAGNOSIS — I08.0 MITRAL AND AORTIC VALVE DISEASE: ICD-10-CM

## 2024-10-16 DIAGNOSIS — I48.92 PAROXYSMAL ATRIAL FLUTTER (HCC): Primary | ICD-10-CM

## 2024-10-16 PROCEDURE — 99214 OFFICE O/P EST MOD 30 MIN: CPT | Performed by: INTERNAL MEDICINE

## 2024-10-16 NOTE — ASSESSMENT & PLAN NOTE
Mr. Cricket Reyes was diagnosed with atrial flutter with 2 1 conduction when he presented to emergency room with symptoms of acute GI illness and January2024.  He underwent cardioversion.  His echocardiogram following cardioversion was significant for dilated chambers and moderately reduced left ventricular systolic function  with valvular regurgitations.  He is currently not on antihypertensive or other cardiac specific medications.  He gives no recent symptoms that suggest angina or decompensated heart failure.  He does have atypical chest pain related to recent chest wall sprain.  The symptoms have resolved.  He is EKG is abnormal possibly due to left ventricular hypertrophy and secondary abnormalities.    Today we reviewed findings of the testing done previously when he was hospitalized.  We discussed the possible etiologies of his cardiomyopathy that is the weakness of the heart muscle.  I explained that it could be related to him being in atrial flutter rhythm for a few days before presentation.  However other causes will also have to be ruled out including coronary artery disease, hypertensive heart disease, viral illness, thyroid disease or alcohol related cardiomyopathy.  We discussed the need for further evaluation and testing.    At this time we are doing the following:  -- An echocardiogram is being requested to reassess left ventricular and right ventricular function and evaluate for any structural cardiac abnormality.  -- Also CT chest without contrast has been requested to evaluate for any thoracic aorta aneurysm.  -- I am withholding from ordering blood work at this time.  Will consider this after review of the echocardiogram and CT of the chest.  -- I am advising him to continue normal activities but avoid lifting objects greater than 50 pounds.  -- Advising him to cut down on alcohol use if he is drinking significantly as this is a big factor in causing cardiomyopathy.  -- We will review advising  him to keep a diary of symptoms and report to us if he is experiencing any palpitation events.  -- Dietary and medical compliance are reinforced.  -- He is advised  to report any concerning symptoms such as chest pain, shortness of breath, decline in exercise tolerance or presyncope/syncope.

## 2024-10-16 NOTE — PROGRESS NOTES
[facility]  [facility]  Primary Office: [address] [place] [id number]  Phone: [phone]; Fax: [phone]  *-*-*-*-*-*-*-*-*-*-*-*-*-*-*-*-*-*-*-*-*-*-*-*-*-*-*-*-*-*-*-*-*-*-*-*-*-*-*-*-*-*-*-*-*-*-*-*-*-*-*-*-*-*  ENCOUNTER DATE: 10/16/24 10:11 AM  PATIENT NAME: Cricket Reyes   1975    [id number]  Age: 49 y.o.      Sex: male  ENCOUNTER PROVIDER:  Raymundo Graff MD, A, [facility]  PRIMARYCARE PHYSICIAN: Tushar Mahoney DO  REFERRING PHYSICIAN: No referring provider defined for this encounter. No ref. provider found   *-*-*-*-*-*-*-*-*-*-*-*-*-*-*-*-*-*-*-*-*-*-*-*-*-*-*-*-*-*-*-*-*-*-*-*-*-*-*-*-*-*-*-*-*-*-*-*-*-*-*-*-*-*-  REASON FOR REFERRAL: Establishment of cardiac care for history of atrial flutter.    *-*-*-*-*-*-*-*-*-*-*-*-*-*-*-*-*-*-*-*-*-*-*-*-*-*-*-*-*-*-*-*-*-*-*-*-*-*-*-*-*-*-*-*-*-*-*-*-*-*-*-*-*-*-  CARDIOLOGY ASSESSMENT & PLAN:   Diagnosis ICD-10-CM Associated Orders   1. Paroxysmal atrial flutter (HCC)  I48.92 Echo complete w/ contrast if indicated     CT chest without contrast      2. Mitral and aortic valve disease  I08.0 Echo complete w/ contrast if indicated     CT chest without contrast      3. Dilated aortic root (HCC)  I77.810 CT chest without contrast      4. Cardiomyopathy, unspecified type (HCC)  I42.9 Echo complete w/ contrast if indicated      5. Biatrial enlargement  I51.7 Echo complete w/ contrast if indicated        1. Paroxysmal atrial flutter (HCC)  Assessment & Plan:  Mr. Cricket Reyes was diagnosed with atrial flutter with 2 1 conduction when he presented to emergency room with symptoms of acute GI illness and January2024.  He underwent cardioversion.  His echocardiogram following cardioversion was significant for dilated chambers and moderately reduced left ventricular systolic function  with valvular regurgitations.  He is currently not on antihypertensive or other cardiac specific medications.  He gives no recent  symptoms that suggest angina or decompensated heart failure.  He does have atypical chest pain related to recent chest wall sprain.  The symptoms have resolved.  He is EKG is abnormal possibly due to left ventricular hypertrophy and secondary abnormalities.    Today we reviewed findings of the testing done previously when he was hospitalized.  We discussed the possible etiologies of his cardiomyopathy that is the weakness of the heart muscle.  I explained that it could be related to him being in atrial flutter rhythm for a few days before presentation.  However other causes will also have to be ruled out including coronary artery disease, hypertensive heart disease, viral illness, thyroid disease or alcohol related cardiomyopathy.  We discussed the need for further evaluation and testing.    At this time we are doing the following:  -- An echocardiogram is being requested to reassess left ventricular and right ventricular function and evaluate for any structural cardiac abnormality.  -- Also CT chest without contrast has been requested to evaluate for any thoracic aorta aneurysm.  -- I am withholding from ordering blood work at this time.  Will consider this after review of the echocardiogram and CT of the chest.  -- I am advising him to continue normal activities but avoid lifting objects greater than 50 pounds.  -- Advising him to cut down on alcohol use if he is drinking significantly as this is a big factor in causing cardiomyopathy.  -- We will review advising him to keep a diary of symptoms and report to us if he is experiencing any palpitation events.  -- Dietary and medical compliance are reinforced.  -- He is advised  to report any concerning symptoms such as chest pain, shortness of breath, decline in exercise tolerance or presyncope/syncope.  Orders:  -     Echo complete w/ contrast if indicated; Future; Expected date: 10/16/2024  -     CT chest without contrast; Future; Expected date: 10/16/2024  2.  Mitral and aortic valve disease  -     Echo complete w/ contrast if indicated; Future; Expected date: 10/16/2024  -     CT chest without contrast; Future; Expected date: 10/16/2024  3. Dilated aortic root (HCC)  -     CT chest without contrast; Future; Expected date: 10/16/2024  4. Cardiomyopathy, unspecified type (HCC)  -     Echo complete w/ contrast if indicated; Future; Expected date: 10/16/2024  5. Biatrial enlargement  -     Echo complete w/ contrast if indicated; Future; Expected date: 10/16/2024     *-*-*-*-*-*-*-*-*-*-*-*-*-*-*-*-*-*-*-*-*-*-*-*-*-*-*-*-*-*-*-*-*-*-*-*-*-*-*-*-*-*-*-*-*-*-*-*-*-*-*-*-*-*-  CURRENT ECG:  No results found for this visit on 10/16/24.    ECG 10/3/2024:      ECG 1/29/2024:    ECG 2007:      *-*-*-*-*-*-*-*-*-*-*-*-*-*-*-*-*-*-*-*-*-*-*-*-*-*-*-*-*-*-*-*-*-*-*-*-*-*-*-*-*-*-*-*-*-*-*-*-*-*-*-*-*-*-  HISTORY OF PRESENT ILLNESS:  Mr. Cricket Reyes is a 49-year-old gentleman with prior medical history significant for:  1.  History of childhood asthma, now resolved  2.  History of cardiac murmur as a child  3.  History of cholecystitis in January 2024, status post cholecystectomy February 2024.  4.  Paroxysmal atrial flutter noted as incidental finding when he presented with cholecystitis, status post cardioversion in the emergency room on 1/29/2024  5.  History of torn ACL as a child when he was 12 years, status post surgical repair    He was diagnosed with atrial flutter rhythm 1 he went to the emergency room with symptoms consistent with acute cholecystitis.  He was cardioverted in the emergency room.  He was seen by cardiology team at that time.  He underwent echocardiogram on 2/5/2024 that demonstrated mild left ventricular hypertrophy moderately reduced left ventricular systolic function with global hypokinesis with biatrial enlargement and mild aortic valve moderate mitral valve regurgitation.  There was no obvious pulmonary hypertension there was aortic root enlargement with  moderate enlargement reported.  He is now establishing care with us.    He mentions that he recently pulled his muscle in the left anterior chest wall while working.  He is a .  He went to primary physician's office on 10/1/2024.  An ECG was performed and was apparently normal.  He was advised conservative measures and muscle relaxant and was discharged home.  This incident prompted him to consider reestablishing care with cardiologist so he is here.  He mentions that besides the recent chest wall incident he has had no unusual chest pain or shortness of breath or palpitations or dizziness or passing out or near passing out or swelling in the feet.  He reports being physically very active and has not experienced any recent decline in his exercise tolerance.  He denies any symptoms of obstructive sleep apnea including excessive snoring or daytime fatigue or morning headaches.    He has never been a smoker.  Reports drinking over the weekend maybe a couple of drinks.  Denies any marijuana use or any other drug use.    Denies any family history of premature coronary artery disease or congestive heart failure or arrhythmias or TIA/CVA.    Reports that he was placed on Xarelto following his atrial flutter event but stopped taking this medication because of having developed rash to eat.    He is currently not taking any specific medications.  He does take multivitamin.  He does use whey protein and other protein building products.    Previous blood work: Last comprehensive blood work is from 2/5/2024 sodium 137 potassium 4.6 chloride 103 bicarb 22 BUN 12 creatinine 1.14 GFR 75  AST was slightly increased at 42 otherwise normal LFTs  Lipid profile 2/4/2024 total cholesterol 133 triglyceride 117 HDL 27 calculated LDL 83  TSH 2.613 on 1/29/2024  Hemoglobin A1c 5.4 on 2/4/2024    Previous cardiac studies: Echocardiogram 2/5/2024:  Dilated left ventricular cavity, mildly increased wall thickness, mild  concentric left ventricular hypertrophy, moderately reduced left ventricular systolic function with global hypokinesis.  EF 40%.  Grade 2 diastolic dysfunction.  Normal right ventricular size and systolic function.  Mild biatrial enlargement.  Normal aortic valve, mild aortic valve regurgitation.  Normal mitral valve, moderate mitral valve regurgitation.  No tricuspid or pulmonic valve regurgitation.  Moderately dilated aortic root.  Ascending aorta was not commented upon.  Normal Favini given  No pericardial effusion.    The 10-year ASCVD risk score (Milind VARGAS, et al., 2019) is: 3.8%    Values used to calculate the score:      Age: 49 years      Sex: Male      Is Non- : No      Diabetic: No      Tobacco smoker: No      Systolic Blood Pressure: 120 mmHg      Is BP treated: No      HDL Cholesterol: 27 mg/dL      Total Cholesterol: 145 mg/dL  (Low risk: Less than <5%; borderline risk: >=5% to <7.5%; intermediate risk: >=7.5% to <20%; high risk:>=20%)  Patient's ASCVD risk category: Low risk    Major cardiac risk factors: -- (Tobacco use, Hypertension, Family history of CHD, Primary severe hypercholesterolemia, DM, multiple major and risk enhancing factors)     Any cardiac clinical risk enhancers from available data: History of atrial flutter (Family history of premature CAD, patient's history of chronic kidney disease, primary hypercholesterolemia, metabolic syndrome, abnormal EZEQUIEL, inflammatory condition such as RA-HIV-psoriasis, RA-HIV-Psoriasis,, pregnancy related complications such as preeclampsia or premature delivery, early menopause, high risk ethnicity such as [place] , social deprivation, physical inactivity, nonalcoholic fatty liver disease psychosocial stress, major psychiatric illness, atrial fibrillation, left ventricular hypertrophy, obstructive sleep apnea, nonalcoholic fatty liver  disease).    *-*-*-*-*-*-*-*-*-*-*-*-*-*-*-*-*-*-*-*-*-*-*-*-*-*-*-*-*-*-*-*-*-*-*-*-*-*-*-*-*-*-*-*-*-*-*-*-*-*-*-*-*-*  PAST MEDICAL HISTORY:  Past Medical History:   Diagnosis Date    Asthma     Heart murmur     PAST SURGICAL HISTORY:   Past Surgical History:   Procedure Laterality Date    ARTHROSCOPIC REPAIR ACL Right     CHOLECYSTECTOMY LAPAROSCOPIC N/A 2/4/2024    Procedure: CHOLECYSTECTOMY LAPAROSCOPIC, intra op cholangiogram;  Surgeon: Siddharth White DO;  Location: [facility];  Service: General    HERNIA REPAIR           FAMILY HISTORY:  Family History   Problem Relation Age of Onset    Arthritis Father     LUDA disease Father     Cancer Sister     Epididymitis Daughter     Diabetes Paternal Aunt     Diabetes Paternal Uncle     SOCIAL HISTORY:  Social History     Tobacco Use   Smoking Status Never    Passive exposure: Never   Smokeless Tobacco Never      Social History     Substance and Sexual Activity   Alcohol Use Yes    Comment: social     Social History     Substance and Sexual Activity   Drug Use Not Currently    @[facility]@     *-*-*-*-*-*-*-*-*-*-*-*-*-*-*-*-*-*-*-*-*-*-*-*-*-*-*-*-*-*-*-*-*-*-*-*-*-*-*-*-*-*-*-*-*-*-*-*-*-*-*-*-*-*  ALLERGIES:  Allergies   Allergen Reactions    Xarelto [Rivaroxaban] Rash    CURRENT SCHEDULED MEDICATIONS:    Current Outpatient Medications:     multivitamin (THERAGRAN) TABS, Take 1 tablet by mouth daily, Disp: , Rfl:      *-*-*-*-*-*-*-*-*-*-*-*-*-*-*-*-*-*-*-*-*-*-*-*-*-*-*-*-*-*-*-*-*-*-*-*-*-*-*-*-*-*-*-*-*-*-*-*-*-*-*-*-*-*  REVIEW OF SYMPTOMS:    Positive for: As noted above in HPI  Negative for: All remaining as reviewed below and in HPI. SYSTEM SYMPTOMS REVIEWED:  General--weight change, fever, night sweats  Respiratoryl-- Wheezing, shortness of breath, cough, URI symptoms, sputum, blood  Cardiovascular--chest pain, syncope, dyspnea on exertion, edema, decline in exercise tolerance, claudication   Gastrointestinal--persistent vomiting, diarrhea, abdominal  distention, blood in stool   Muscular or skeletal--joint pain or swelling   Neurologic--headaches, syncope, abnormal movement  Hematologic--history of easy bruising and bleeding   Endocrine--thyroid enlargement, heat or cold intolerance, polyuria   Psychiatric--anxiety, depression      *-*-*-*-*-*-*-*-*-*-*-*-*-*-*-*-*-*-*-*-*-*-*-*-*-*-*-*-*-*-*-*-*-*-*-*-*-*-*-*-*-*-*-*-*-*-*-*-*-*-*-*-*-*  CURRENT OUTPATIENT MEDICATIONS:     Current Outpatient Medications:     multivitamin (THERAGRAN) TABS, Take 1 tablet by mouth daily, Disp: , Rfl:     *-*-*-*-*-*-*-*-*-*-*-*-*-*-*-*-*-*-*-*-*-*-*-*-*-*-*-*-*-*-*-*-*-*-*-*-*-*-*-*-*-*-*-*-*-*-*-*-*-*-*-*-*-*  VITAL SIGNS:  Vitals:    10/16/24 0914   BP: 120/60   BP Location: Left arm   Patient Position: Sitting   Cuff Size: Adult   Pulse: 80   Weight: 95.2 kg (209 lb 12.8 oz)       BMI: Body mass index is 36.01 kg/m².    WEIGHTS:   Wt Readings from Last 25 Encounters:   10/16/24 95.2 kg (209 lb 12.8 oz)   10/02/24 94.4 kg (208 lb 3.2 oz)   10/01/24 93 kg (205 lb)   02/15/24 90.4 kg (199 lb 3.2 oz)   02/09/24 92 kg (202 lb 12.8 oz)   02/05/24 96.2 kg (212 lb)   01/30/24 96.4 kg (212 lb 9.6 oz)   01/29/24 92.8 kg (204 lb 9.4 oz)   02/18/14 93 kg (205 lb)   01/18/13 88.5 kg (195 lb)   01/04/13 89.4 kg (197 lb)        *-*-*-*-*-*-*-*-*-*-*-*-*-*-*-*-*-*-*-*-*-*-*-*-*-*-*-*-*-*-*-*-*-*-*-*-*-*-*-*-*-*-*-*-*-*-*-*-*-*-*-*-*-*-  PHYSICAL EXAM:  General Appearance:    Alert, cooperative, no distress, appears stated age, increased BMI   Head, Eyes, ENT:    No obvious abnormality, moist mucous mebranes.   Neck:   Supple, no carotid bruit. No JVD, no obvious lymphadenoapthy   Back:     Symmetric, no curvature.   Lungs:     Respirations unlabored. Clear to auscultation bilaterally,    Chest wall:    No tenderness or deformity   Heart:    Regular rate and rhythm, normal intensity heart sounds, no murmur, rub  or gallop.   Abdomen:     Soft, non-tender.   Extremities:   Extremities warm, no  cyanosis or edema    Skin:   No venostatic changes in lower extremities.   Normal skin color, texture, and turgor. No rashes or lesions   Neuro: Pt is alert and oriented time 3 with no gross motor deficits.   Psychiatric/Behavioral: Mood is normal. Behavior is normal.   *-*-*-*-*-*-*-*-*-*-*-*-*-*-*-*-*-*-*-*-*-*-*-*-*-*-*-*-*-*-*-*-*-*-*-*-*-*-*-*-*-*-*-*-*-*-*-*-*-*-*-*-*-*-  LABORATORY DATA: I have personally reviewed the available laboratory data.        Potassium   Date Value Ref Range Status   02/05/2024 4.6 3.5 - 5.3 mmol/L Final   02/04/2024 4.0 3.5 - 5.3 mmol/L Final   02/03/2024 4.0 3.5 - 5.3 mmol/L Final     Chloride   Date Value Ref Range Status   02/05/2024 103 96 - 108 mmol/L Final   02/04/2024 107 96 - 108 mmol/L Final   02/03/2024 103 96 - 108 mmol/L Final     CO2   Date Value Ref Range Status   02/05/2024 22 21 - 32 mmol/L Final   02/04/2024 22 21 - 32 mmol/L Final   02/03/2024 22 21 - 32 mmol/L Final     BUN   Date Value Ref Range Status   02/05/2024 12 5 - 25 mg/dL Final   02/04/2024 9 5 - 25 mg/dL Final   02/03/2024 15 5 - 25 mg/dL Final     Creatinine   Date Value Ref Range Status   02/05/2024 1.14 0.60 - 1.30 mg/dL Final     Comment:     Standardized to IDMS reference method   02/04/2024 1.02 0.60 - 1.30 mg/dL Final     Comment:     Standardized to IDMS reference method   02/03/2024 1.07 0.60 - 1.30 mg/dL Final     Comment:     Standardized to IDMS reference method     eGFR   Date Value Ref Range Status   02/05/2024 75 ml/min/1.73sq m Final   02/04/2024 85 ml/min/1.73sq m Final   02/03/2024 81 ml/min/1.73sq m Final     Calcium   Date Value Ref Range Status   02/05/2024 8.6 8.4 - 10.2 mg/dL Final   02/04/2024 8.5 8.4 - 10.2 mg/dL Final   02/03/2024 8.7 8.4 - 10.2 mg/dL Final     AST   Date Value Ref Range Status   02/05/2024 42 (H) 13 - 39 U/L Final   02/04/2024 25 13 - 39 U/L Final   02/03/2024 20 13 - 39 U/L Final     ALT   Date Value Ref Range Status   02/05/2024 52 7 - 52 U/L Final      "Comment:     Specimen collection should occur prior to Sulfasalazine administration due to the potential for falsely depressed results.    02/04/2024 32 7 - 52 U/L Final     Comment:     Specimen collection should occur prior to Sulfasalazine administration due to the potential for falsely depressed results.    02/03/2024 32 7 - 52 U/L Final     Comment:     Specimen collection should occur prior to Sulfasalazine administration due to the potential for falsely depressed results.      Alkaline Phosphatase   Date Value Ref Range Status   02/05/2024 54 34 - 104 U/L Final   02/04/2024 52 34 - 104 U/L Final   02/03/2024 52 34 - 104 U/L Final     Magnesium   Date Value Ref Range Status   02/04/2024 2.2 1.9 - 2.7 mg/dL Final   01/29/2024 1.7 (L) 1.9 - 2.7 mg/dL Final     WBC   Date Value Ref Range Status   02/05/2024 8.98 4.31 - 10.16 Thousand/uL Final   02/04/2024 6.33 4.31 - 10.16 Thousand/uL Final   02/03/2024 7.89 4.31 - 10.16 Thousand/uL Final     Hemoglobin   Date Value Ref Range Status   02/05/2024 13.5 12.0 - 17.0 g/dL Final   02/04/2024 13.2 12.0 - 17.0 g/dL Final   02/03/2024 13.8 12.0 - 17.0 g/dL Final     Platelets   Date Value Ref Range Status   02/05/2024 333 149 - 390 Thousands/uL Final   02/04/2024 283 149 - 390 Thousands/uL Final   02/04/2024 277 149 - 390 Thousands/uL Final     No results found for: \"PT\", \"PTT\", \"INR\"  No results found for: \"CK\", \"CKMB\", \"DIGOXIN\"  No results found for: \"TSH\"  HDL, Direct   Date Value Ref Range Status   02/04/2024 27 (L) >=40 mg/dL Final     Triglycerides   Date Value Ref Range Status   02/04/2024 117 See Comment mg/dL Final     Comment:     Triglyceride:     0-9Y            <75mg/dL     10Y-17Y         <90 mg/dL       >=18Y     Normal          <150 mg/dL     Borderline High 150-199 mg/dL     High            200-499 mg/dL        Very High       >499 mg/dL    Specimen collection should occur prior to Metamizole administration due to the potential for falsely depressed " "results.      Hemoglobin A1C   Date Value Ref Range Status   02/04/2024 5.4 Normal 4.0-5.6%; PreDiabetic 5.7-6.4%; Diabetic >=6.5%; Glycemic control for adults with diabetes <7.0% % Final     No results found for: \"BLOODCX\", \"SPUTUMCULTUR\", \"GRAMSTAIN\", \"URINECX\", \"WOUNDCULT\", \"BODYFLUIDCUL\", \"MRSACULTURE\", \"INFLUAPCR\", \"INFLUBPCR\", \"RSVPCR\", \"LEGIONELLAUR\", \"CDIFFTOXINB\"    *-*-*-*-*-*-*-*-*-*-*-*-*-*-*-*-*-*-*-*-*-*-*-*-*-*-*-*-*-*-*-*-*-*-*-*-*-*-*-*-*-*-*-*-*-*-*-*-*-*-*-*-*-*-  RADIOLOGY RESULTS:  XR cholangiogram intraoperative    Result Date: 2/5/2024  Impression: Gallstones. No common bile duct filling defects. Please see procedure report for further details. Workstation performed: [id number]     XR chest 2 views    Result Date: 2/4/2024  Impression: No acute cardiopulmonary disease. Workstation performed: [id number]     US right upper quadrant    Result Date: 2/3/2024  Impression: Gallbladder wall thickening with stones and pericholecystic fluid. Findings are suspicious for cholecystitis. This could be acute or chronic although haziness of the surrounding fat on CT favors acute.. This should be correlated with the acuity of the symptomatology. Of note, a sonographic Truong's was not elicited and the white blood cell count does appear normal. Hepatobiliary study may be useful in differentiating these possibilities. The study was marked in EPIC for immediate notification. Workstation performed: [id number]     CT abdomen pelvis with contrast    Result Date: 2/3/2024  Impression: Cholelithiasis with associated wall thickening and mild pericholecystic fluid concerning for acute cholecystitis. Ultrasound correlation can be considered for further assessment. New hypodense lesion involving the inferior pancreatic head and uncinate process compared to the prior 7/6/2009 examination. MRI of the abdomen with contrast is recommended on a nonemergent basis. Mild to moderate bladder wall thickening likely in part " related to under distention. Correlate with clinical and laboratory parameters to exclude cystitis. I personally discussed this study with RUDOLPH FLORES on 2/3/2024 9:29 AM. Workstation performed: [id number]       *-*-*-*-*-*-*-*-*-*-*-*-*-*-*-*-*-*-*-*-*-*-*-*-*-*-*-*-*-*-*-*-*-*-*-*-*-*-*-*-*-*-*-*-*-*-*-*-*-*-*-*-*-*-  LAST ECHOCARDIOGRAM AND OTHER CARDIOLOGY RESULTS:  No results found for this or any previous visit.    No results found for this or any previous visit.    No results found for this or any previous visit.    No results found for this or any previous visit.       *-*-*-*-*-*-*-*-*-*-*-*-*-*-*-*-*-*-*-*-*-*-*-*-*-*-*-*-*-*-*-*-*-*-*-*-*-*-*-*-*-*-*-*-*-*-*-*-*-*-*-*-*-*-  SIGNATURES:   @SIG@   Raymundo Graff MD     CC:   Tushar Mahoney DO   No ref. provider found

## 2024-10-16 NOTE — PATIENT INSTRUCTIONS
CARDIOLOGY ASSESSMENT & PLAN:   Diagnosis ICD-10-CM Associated Orders   1. Paroxysmal atrial flutter (HCC)  I48.92 Echo complete w/ contrast if indicated     CT chest without contrast      2. Mitral and aortic valve disease  I08.0 Echo complete w/ contrast if indicated     CT chest without contrast      3. Dilated aortic root (HCC)  I77.810 CT chest without contrast      4. Cardiomyopathy, unspecified type (HCC)  I42.9 Echo complete w/ contrast if indicated      5. Biatrial enlargement  I51.7 Echo complete w/ contrast if indicated        1. Paroxysmal atrial flutter (HCC)  Assessment & Plan:  Mr. Cricket Reyes was diagnosed with atrial flutter with 2 1 conduction when he presented to emergency room with symptoms of acute GI illness and January2024.  He underwent cardioversion.  His echocardiogram following cardioversion was significant for dilated chambers and moderately reduced left ventricular systolic function  with valvular regurgitations.  He is currently not on antihypertensive or other cardiac specific medications.  He gives no recent symptoms that suggest angina or decompensated heart failure.  He does have atypical chest pain related to recent chest wall sprain.  The symptoms have resolved.  He is EKG is abnormal possibly due to left ventricular hypertrophy and secondary abnormalities.    Today we reviewed findings of the testing done previously when he was hospitalized.  We discussed the possible etiologies of his cardiomyopathy that is the weakness of the heart muscle.  I explained that it could be related to him being in atrial flutter rhythm for a few days before presentation.  However other causes will also have to be ruled out including coronary artery disease, hypertensive heart disease, viral illness, thyroid disease or alcohol related cardiomyopathy.  We discussed the need for further evaluation and testing.    At this time we are doing the following:  -- An echocardiogram is being requested to  reassess left ventricular and right ventricular function and evaluate for any structural cardiac abnormality.  -- Also CT chest without contrast has been requested to evaluate for any thoracic aorta aneurysm.  -- I am withholding from ordering blood work at this time.  Will consider this after review of the echocardiogram and CT of the chest.  -- I am advising him to continue normal activities but avoid lifting objects greater than 50 pounds.  -- Advising him to cut down on alcohol use if he is drinking significantly as this is a big factor in causing cardiomyopathy.  -- We will review advising him to keep a diary of symptoms and report to us if he is experiencing any palpitation events.  -- Dietary and medical compliance are reinforced.  -- He is advised  to report any concerning symptoms such as chest pain, shortness of breath, decline in exercise tolerance or presyncope/syncope.  Orders:  -     Echo complete w/ contrast if indicated; Future; Expected date: 10/16/2024  -     CT chest without contrast; Future; Expected date: 10/16/2024  2. Mitral and aortic valve disease  -     Echo complete w/ contrast if indicated; Future; Expected date: 10/16/2024  -     CT chest without contrast; Future; Expected date: 10/16/2024  3. Dilated aortic root (HCC)  -     CT chest without contrast; Future; Expected date: 10/16/2024  4. Cardiomyopathy, unspecified type (HCC)  -     Echo complete w/ contrast if indicated; Future; Expected date: 10/16/2024  5. Biatrial enlargement  -     Echo complete w/ contrast if indicated; Future; Expected date: 10/16/2024     *-*-*-*-*-*-*-*-*-*-*-*-*-*-*-*-*-*-*-*-*-*-*-*-*-*-*-*-*-*-*-*-*-*-*-*-*-*-*-*-*-*-*-*-*-*-*-*-*-*-*-*-*-*-  CURRENT ECG:  No results found for this visit on 10/16/24.    ECG 10/3/2024:      ECG 1/29/2024:    ECG 2007:

## 2024-10-22 ENCOUNTER — OFFICE VISIT (OUTPATIENT)
Dept: FAMILY MEDICINE CLINIC | Facility: CLINIC | Age: 49
End: 2024-10-22
Payer: COMMERCIAL

## 2024-10-22 VITALS
TEMPERATURE: 98 F | DIASTOLIC BLOOD PRESSURE: 82 MMHG | WEIGHT: 207.2 LBS | BODY MASS INDEX: 35.37 KG/M2 | OXYGEN SATURATION: 99 % | HEIGHT: 64 IN | SYSTOLIC BLOOD PRESSURE: 122 MMHG | RESPIRATION RATE: 18 BRPM | HEART RATE: 77 BPM

## 2024-10-22 DIAGNOSIS — Z00.00 ANNUAL PHYSICAL EXAM: Primary | ICD-10-CM

## 2024-10-22 DIAGNOSIS — M79.675 PAIN AND SWELLING OF TOE OF LEFT FOOT: ICD-10-CM

## 2024-10-22 DIAGNOSIS — M79.89 PAIN AND SWELLING OF TOE OF LEFT FOOT: ICD-10-CM

## 2024-10-22 DIAGNOSIS — L23.9 ALLERGIC CONTACT DERMATITIS, UNSPECIFIED TRIGGER: ICD-10-CM

## 2024-10-22 DIAGNOSIS — Z13.6 SCREENING FOR CARDIOVASCULAR CONDITION: ICD-10-CM

## 2024-10-22 PROBLEM — Z11.59 NEED FOR HEPATITIS C SCREENING TEST: Status: ACTIVE | Noted: 2024-10-22

## 2024-10-22 PROCEDURE — 99396 PREV VISIT EST AGE 40-64: CPT | Performed by: FAMILY MEDICINE

## 2024-10-22 PROCEDURE — 99214 OFFICE O/P EST MOD 30 MIN: CPT | Performed by: FAMILY MEDICINE

## 2024-10-22 RX ORDER — HYDROCORTISONE 25 MG/G
OINTMENT TOPICAL 3 TIMES DAILY
Qty: 28.35 G | Refills: 0 | Status: SHIPPED | OUTPATIENT
Start: 2024-10-22

## 2024-10-22 RX ORDER — NAPROXEN 500 MG/1
500 TABLET ORAL 2 TIMES DAILY WITH MEALS
Qty: 30 TABLET | Refills: 0 | Status: SHIPPED | OUTPATIENT
Start: 2024-10-22

## 2024-10-22 NOTE — ASSESSMENT & PLAN NOTE
On left forearm.  Orders:    hydrocortisone 2.5 % ointment; Apply topically 3 (three) times a day

## 2024-10-22 NOTE — ASSESSMENT & PLAN NOTE
Orders:    Comprehensive metabolic panel; Future    PSA, Total Screen; Future    Lipid Panel with Direct LDL reflex; Future

## 2024-10-22 NOTE — PROGRESS NOTES
Adult Annual Physical  Name: Cricket Reyes      : 1975      MRN: [id number]  Encounter Provider: Shira Montes DO  Encounter Date: 10/22/2024   Encounter department: [facility]    Assessment & Plan  Annual physical exam         Screening for cardiovascular condition    Orders:    Comprehensive metabolic panel; Future    PSA, Total Screen; Future    Lipid Panel with Direct LDL reflex; Future    Pain and swelling of toe of left foot  Likely OA, worse with activity  Orders:    naproxen (Naprosyn) 500 mg tablet; Take 1 tablet (500 mg total) by mouth 2 (two) times a day with meals    XR toe left fourth min 2 views; Future    Allergic contact dermatitis, unspecified trigger  On left forearm.  Orders:    hydrocortisone 2.5 % ointment; Apply topically 3 (three) times a day    Immunizations and preventive care screenings were discussed with patient today. Appropriate education was printed on patient's after visit summary.    Discussed risks and benefits of prostate cancer screening. We discussed the controversial history of PSA screening for prostate cancer in the [place] as well as the risk of over detection and over treatment of prostate cancer by way of PSA screening.  The patient understands that PSA blood testing is an imperfect way to screen for prostate cancer and that elevated PSA levels in the blood may also be caused by infection, inflammation, prostatic trauma or manipulation, urological procedures, or by benign prostatic enlargement.    The role of the digital rectal examination in prostate cancer screening was also discussed and I discussed with him that there is large interobserver variability in the findings of digital rectal examination.    Counseling:  Alcohol/drug use: discussed moderation in alcohol intake, the recommendations for healthy alcohol use, and avoidance of illicit drug use.  Dental Health: discussed importance of regular tooth brushing, flossing, and dental  visits.  Injury prevention: discussed safety/seat belts, safety helmets, smoke detectors, carbon monoxide detectors, and smoking near bedding or upholstery.  Sexual health: discussed sexually transmitted diseases, partner selection, use of condoms, avoidance of unintended pregnancy, and contraceptive alternatives.  Exercise: the importance of regular exercise/physical activity was discussed. Recommend exercise 3-5 times per week for at least 30 minutes.       Depression Screening and Follow-up Plan: Patient was screened for depression during today's encounter. They screened negative with a PHQ-2 score of 0.      History of Present Illness     Adult Annual Physical:  Patient presents for annual physical. Left forearm rash, contact derm.    Left 4th toe pain, likely OA..     Diet and Physical Activity:  - Diet/Nutrition: well balanced diet.  - Exercise: 3-4 times a week on average, moderate cardiovascular exercise and 1-2 hours on average.    Depression Screening:  - PHQ-2 Score: 0    General Health:  - Sleep: sleeps well and 4-6 hours of sleep on average.  - Hearing: normal hearing bilateral ears.  - Vision: no vision problems, most recent eye exam < 1 year ago and goes for regular eye exams.  - Dental: regular dental visits and brushes teeth twice daily.     Health:  - History of STDs: no.   - Urinary symptoms: none.     Advanced Care Planning:  - Has an advanced directive?: no    - Has a durable medical POA?: no    - ACP document given to patient?: no      Review of Systems   Constitutional:  Negative for chills and fever.   HENT:  Negative for ear pain and sore throat.    Eyes:  Negative for pain and visual disturbance.   Respiratory:  Negative for cough and shortness of breath.    Cardiovascular:  Negative for chest pain and palpitations.   Gastrointestinal:  Negative for abdominal pain and vomiting.   Genitourinary:  Negative for dysuria and hematuria.   Musculoskeletal:  Positive for arthralgias. Negative for  "back pain.   Skin:  Positive for rash. Negative for color change.   Neurological:  Negative for seizures and syncope.   All other systems reviewed and are negative.    Medical History Reviewed by provider this encounter:  Tobacco  Allergies  Meds  Problems  Med Hx  Surg Hx  Fam Hx       Current Outpatient Medications on File Prior to Visit   Medication Sig Dispense Refill    multivitamin (THERAGRAN) TABS Take 1 tablet by mouth daily       No current facility-administered medications on file prior to visit.      Social History     Tobacco Use    Smoking status: Never     Passive exposure: Never    Smokeless tobacco: Never   Vaping Use    Vaping status: Never Used   Substance and Sexual Activity    Alcohol use: Yes     Comment: social    Drug use: Not Currently    Sexual activity: Not on file       Objective     /82 (BP Location: Left arm, Patient Position: Sitting, Cuff Size: Large)   Pulse 77   Temp 98 °F (36.7 °C) (Temporal)   Resp 18   Ht 5' 4\" (1.626 m)   Wt 94 kg (207 lb 3.2 oz)   SpO2 99%   BMI 35.57 kg/m²     Physical Exam  Vitals reviewed.   Constitutional:       General: He is not in acute distress.     Appearance: Normal appearance. He is well-developed.   HENT:      Head: Normocephalic and atraumatic.   Eyes:      Conjunctiva/sclera: Conjunctivae normal.   Cardiovascular:      Rate and Rhythm: Normal rate and regular rhythm.      Pulses: Normal pulses.      Heart sounds: Normal heart sounds. No murmur heard.  Pulmonary:      Effort: Pulmonary effort is normal. No respiratory distress.      Breath sounds: Normal breath sounds.   Abdominal:      Palpations: Abdomen is soft.      Tenderness: There is no abdominal tenderness.   Musculoskeletal:         General: Tenderness present. No swelling.      Cervical back: Neck supple.      Comments: Left fourth toe, mildly swollen, nontender joint.   Skin:     General: Skin is warm and dry.      Capillary Refill: Capillary refill takes less than 2 " seconds.      Findings: Rash present.      Comments: Left forearm rash likely contact derm   Neurological:      Mental Status: He is alert.   Psychiatric:         Mood and Affect: Mood normal.     Administrative Statements   I have spent a total time of 47 minutes in caring for this patient on the day of the visit/encounter including Diagnostic results, Prognosis, Risks and benefits of tx options, Instructions for management, Impressions, Counseling / Coordination of care, Documenting in the medical record, Reviewing / ordering tests, medicine, procedures  , and Obtaining or reviewing history  .

## 2024-10-22 NOTE — ASSESSMENT & PLAN NOTE
Likely OA, worse with activity  Orders:    naproxen (Naprosyn) 500 mg tablet; Take 1 tablet (500 mg total) by mouth 2 (two) times a day with meals    XR toe left fourth min 2 views; Future

## 2024-10-22 NOTE — PATIENT INSTRUCTIONS
"Patient Education     Routine physical for adults   The Basics   Written by the doctors and editors at [facility]   What is a physical? -- A physical is a routine visit, or \"check-up,\" with your doctor. You might also hear it called a \"wellness visit\" or \"preventive visit.\"  During each visit, the doctor will:   Ask about your physical and mental health   Ask about your habits, behaviors, and lifestyle   Do an exam   Give you vaccines if needed   Talk to you about any medicines you take   Give advice about your health   Answer your questions  Getting regular check-ups is an important part of taking care of your health. It can help your doctor find and treat any problems you have. But it's also important for preventing health problems.  A routine physical is different from a \"sick visit.\" A sick visit is when you see a doctor because of a health concern or problem. Since physicals are scheduled ahead of time, you can think about what you want to ask the doctor.  How often should I get a physical? -- It depends on your age and health. In general, for people age 21 years and older:   If you are younger than 50 years, you might be able to get a physical every 3 years.   If you are 50 years or older, your doctor might recommend a physical every year.  If you have an ongoing health condition, like diabetes or high blood pressure, your doctor will probably want to see you more often.  What happens during a physical? -- In general, each visit will include:   Physical exam - The doctor or nurse will check your height, weight, heart rate, and blood pressure. They will also look at your eyes and ears. They will ask about how you are feeling and whether you have any symptoms that bother you.   Medicines - It's a good idea to bring a list of all the medicines you take to each doctor visit. Your doctor will talk to you about your medicines and answer any questions. Tell them if you are having any side effects that bother you. You " "should also tell them if you are having trouble paying for any of your medicines.   Habits and behaviors - This includes:   Your diet   Your exercise habits   Whether you smoke, drink alcohol, or use drugs   Whether you are sexually active   Whether you feel safe at home  Your doctor will talk to you about things you can do to improve your health and lower your risk of health problems. They will also offer help and support. For example, if you want to quit smoking, they can give you advice and might prescribe medicines. If you want to improve your diet or get more physical activity, they can help you with this, too.   Lab tests, if needed - The tests you get will depend on your age and situation. For example, your doctor might want to check your:   Cholesterol   Blood sugar   Iron level   Vaccines - The recommended vaccines will depend on your age, health, and what vaccines you already had. Vaccines are very important because they can prevent certain serious or deadly infections.   Discussion of screening - \"Screening\" means checking for diseases or other health problems before they cause symptoms. Your doctor can recommend screening based on your age, risk, and preferences. This might include tests to check for:   Cancer, such as breast, prostate, cervical, ovarian, colorectal, prostate, lung, or skin cancer   Sexually transmitted infections, such as chlamydia and gonorrhea   Mental health conditions like depression and anxiety  Your doctor will talk to you about the different types of screening tests. They can help you decide which screenings to have. They can also explain what the results might mean.   Answering questions - The physical is a good time to ask the doctor or nurse questions about your health. If needed, they can refer you to other doctors or specialists, too.  Adults older than 65 years often need other care, too. As you get older, your doctor will talk to you about:   How to prevent falling at " home   Hearing or vision tests   Memory testing   How to take your medicines safely   Making sure that you have the help and support you need at home  All topics are updated as new evidence becomes available and our peer review process is complete.  This topic retrieved from [employer] on: May 02, 2024.  Topic 093026 Version 1.0  Release: 32.4.3 - C32.122  © 2024 UpToDate, Inc. and/or its affiliates. All rights reserved.  Consumer Information Use and Disclaimer   Disclaimer: This generalized information is a limited summary of diagnosis, treatment, and/or medication information. It is not meant to be comprehensive and should be used as a tool to help the user understand and/or assess potential diagnostic and treatment options. It does NOT include all information about conditions, treatments, medications, side effects, or risks that may apply to a specific patient. It is not intended to be medical advice or a substitute for the medical advice, diagnosis, or treatment of a health care provider based on the health care provider's examination and assessment of a patient's specific and unique circumstances. Patients must speak with a health care provider for complete information about their health, medical questions, and treatment options, including any risks or benefits regarding use of medications. This information does not endorse any treatments or medications as safe, effective, or approved for treating a specific patient. UpToDate, Inc. and its affiliates disclaim any warranty or liability relating to this information or the use thereof.The use of this information is governed by the Terms of Use, available at https://www.[employer].com/en/know/clinical-effectiveness-terms. 2024© UpToDate, Inc. and its affiliates and/or licensors. All rights reserved.  Copyright   © 2024 UpToDate, Inc. and/or its affiliates. All rights reserved.

## 2024-10-25 ENCOUNTER — TELEPHONE (OUTPATIENT)
Dept: FAMILY MEDICINE CLINIC | Facility: CLINIC | Age: 49
End: 2024-10-25

## 2024-10-25 ENCOUNTER — APPOINTMENT (OUTPATIENT)
Dept: RADIOLOGY | Age: 49
End: 2024-10-25
Payer: COMMERCIAL

## 2024-10-25 DIAGNOSIS — M79.89 PAIN AND SWELLING OF TOE OF LEFT FOOT: ICD-10-CM

## 2024-10-25 DIAGNOSIS — M79.675 PAIN AND SWELLING OF TOE OF LEFT FOOT: ICD-10-CM

## 2024-10-25 PROCEDURE — 73660 X-RAY EXAM OF TOE(S): CPT

## 2024-10-25 NOTE — TELEPHONE ENCOUNTER
----- Message from Shira Montes DO sent at 10/25/2024 10:55 AM EDT -----  Please let patient know that his toe has some fluid related to rubbing/friction. Likely from shoe wear, but no major signs of arthritis or bone degeneration, which is good news. Use tylenol for pain control, can use ibuprofen or aleve if needed.  ----- Message -----  From: Michael, Radiology Results In  Sent: 10/25/2024  10:18 AM EDT  To: Shira Montes DO

## 2024-11-21 PROBLEM — Z13.6 SCREENING FOR CARDIOVASCULAR CONDITION: Status: RESOLVED | Noted: 2024-10-22 | Resolved: 2024-11-21

## 2024-11-21 PROBLEM — Z11.59 NEED FOR HEPATITIS C SCREENING TEST: Status: RESOLVED | Noted: 2024-10-22 | Resolved: 2024-11-21

## 2024-12-05 ENCOUNTER — TELEPHONE (OUTPATIENT)
Age: 49
End: 2024-12-05

## 2024-12-05 NOTE — TELEPHONE ENCOUNTER
[facility]    Doctor: Dajuan    Reason for call: Jeanie called to let  the office know that the machines are down and they had to cancel Cricket' CT scan. They did leave him a message. They do not know when they will be back up and running.  Is there somewhere else he can go.  Please call her if you have any questions.    Thank you    Call back#: [phone]      Problem: Impaired Functional Mobility  Goal: Achieve highest/safest level of mobility/gait  Description  Interventions:  - Assess patient's functional ability and stability  - Promote increasing activity/tolerance for mobility and gait  - Educate and eng

## 2024-12-12 ENCOUNTER — HOSPITAL ENCOUNTER (OUTPATIENT)
Dept: NON INVASIVE DIAGNOSTICS | Facility: HOSPITAL | Age: 49
Discharge: HOME | End: 2024-12-12
Attending: INTERNAL MEDICINE
Payer: COMMERCIAL

## 2024-12-12 ENCOUNTER — APPOINTMENT (OUTPATIENT)
Dept: CT IMAGING | Facility: HOSPITAL | Age: 49
End: 2024-12-12
Attending: INTERNAL MEDICINE
Payer: COMMERCIAL

## 2024-12-12 VITALS
HEIGHT: 64 IN | SYSTOLIC BLOOD PRESSURE: 122 MMHG | HEART RATE: 69 BPM | WEIGHT: 207 LBS | DIASTOLIC BLOOD PRESSURE: 82 MMHG | BODY MASS INDEX: 35.34 KG/M2

## 2024-12-12 DIAGNOSIS — I51.7 BIATRIAL ENLARGEMENT: ICD-10-CM

## 2024-12-12 DIAGNOSIS — I08.0 MITRAL AND AORTIC VALVE DISEASE: ICD-10-CM

## 2024-12-12 DIAGNOSIS — I48.92 PAROXYSMAL ATRIAL FLUTTER (HCC): ICD-10-CM

## 2024-12-12 DIAGNOSIS — I42.9 CARDIOMYOPATHY, UNSPECIFIED TYPE (HCC): ICD-10-CM

## 2024-12-12 PROCEDURE — 93306 TTE W/DOPPLER COMPLETE: CPT | Performed by: INTERNAL MEDICINE

## 2024-12-12 PROCEDURE — 93306 TTE W/DOPPLER COMPLETE: CPT

## 2024-12-15 LAB
AORTIC ROOT: 4.4 CM
AORTIC VALVE MEAN VELOCITY: 12 M/S
APICAL FOUR CHAMBER EJECTION FRACTION: 41 %
ASCENDING AORTA: 4.2 CM
AV AREA BY CONTINUOUS VTI: 2.7 CM2
AV AREA PEAK VELOCITY: 2.9 CM2
AV LVOT MEAN GRADIENT: 2 MMHG
AV LVOT PEAK GRADIENT: 4 MMHG
AV MEAN GRADIENT: 7 MMHG
AV PEAK GRADIENT: 12 MMHG
AV REGURGITATION PRESSURE HALF TIME: 325 MS
AV VALVE AREA: 2.74 CM2
AV VELOCITY RATIO: 0.59
BSA FOR ECHO PROCEDURE: 1.98 M2
DOP CALC AO PEAK VEL: 1.75 M/S
DOP CALC AO VTI: 37.39 CM
DOP CALC LVOT AREA: 4.91 CM2
DOP CALC LVOT CARDIAC INDEX: 3.85 L/MIN/M2
DOP CALC LVOT CARDIAC OUTPUT: 7.66 L/MIN
DOP CALC LVOT DIAMETER: 2.5 CM
DOP CALC LVOT PEAK VEL VTI: 20.9 CM
DOP CALC LVOT PEAK VEL: 1.03 M/S
DOP CALC LVOT STROKE INDEX: 49.7 ML/M2
DOP CALC LVOT STROKE VOLUME: 102.54
FRACTIONAL SHORTENING: 11 (ref 28–44)
INTERVENTRICULAR SEPTUM IN DIASTOLE (PARASTERNAL SHORT AXIS VIEW): 1.3 CM
INTERVENTRICULAR SEPTUM: 1.3 CM (ref 0.6–1.1)
LAAS-AP2: 27.3 CM2
LAAS-AP4: 25.4 CM2
LEFT ATRIUM SIZE: 4.9 CM
LEFT ATRIUM VOLUME (MOD BIPLANE): 95 ML
LEFT ATRIUM VOLUME INDEX (MOD BIPLANE): 47.7 ML/M2
LEFT INTERNAL DIMENSION IN SYSTOLE: 6.3 CM (ref 2.1–4)
LEFT VENTRICLE DIASTOLIC VOLUME (MOD BIPLANE): 302 ML
LEFT VENTRICLE DIASTOLIC VOLUME INDEX (MOD BIPLANE): 152.5 ML/M2
LEFT VENTRICLE SYSTOLIC VOLUME (MOD BIPLANE): 197 ML
LEFT VENTRICLE SYSTOLIC VOLUME INDEX (MOD BIPLANE): 99.5 ML/M2
LEFT VENTRICULAR INTERNAL DIMENSION IN DIASTOLE: 7.1 CM (ref 3.5–6)
LEFT VENTRICULAR POSTERIOR WALL IN END DIASTOLE: 1.3 CM
LEFT VENTRICULAR STROKE VOLUME: 61 ML
LV EF: 35 %
LVSV (TEICH): 61 ML
RIGHT ATRIUM AREA SYSTOLE A4C: 18.6 CM2
RIGHT VENTRICLE ID DIMENSION: 3.3 CM
SINOTUBULAR JUNCTION: 4 CM
SL CV AV DECELERATION TIME RETROGRADE: 1122 MS
SL CV AV PEAK GRADIENT RETROGRADE: 83 MMHG
SL CV LEFT ATRIUM LENGTH A2C: 5.9 CM
SL CV PED ECHO LEFT VENTRICLE DIASTOLIC VOLUME (MOD BIPLANE) 2D: 264 ML
SL CV PED ECHO LEFT VENTRICLE SYSTOLIC VOLUME (MOD BIPLANE) 2D: 204 ML
SL CV SINUS OF VALSALVA 2D: 4.4 CM
STJ: 4 CM
TRICUSPID ANNULAR PLANE SYSTOLIC EXCURSION: 2.2 CM

## 2025-01-22 PROBLEM — I50.22 CHRONIC HFREF (HEART FAILURE WITH REDUCED EJECTION FRACTION) (HCC): Status: ACTIVE | Noted: 2025-01-22

## 2025-01-23 ENCOUNTER — OFFICE VISIT (OUTPATIENT)
Dept: CARDIOLOGY CLINIC | Facility: CLINIC | Age: 50
End: 2025-01-23
Payer: COMMERCIAL

## 2025-01-23 VITALS
HEART RATE: 62 BPM | OXYGEN SATURATION: 98 % | HEIGHT: 64 IN | WEIGHT: 208.6 LBS | BODY MASS INDEX: 35.61 KG/M2 | DIASTOLIC BLOOD PRESSURE: 70 MMHG | SYSTOLIC BLOOD PRESSURE: 138 MMHG

## 2025-01-23 DIAGNOSIS — I48.92 PAROXYSMAL ATRIAL FLUTTER (HCC): Primary | ICD-10-CM

## 2025-01-23 DIAGNOSIS — I77.810 DILATED AORTIC ROOT (HCC): ICD-10-CM

## 2025-01-23 DIAGNOSIS — I50.22 CHRONIC HFREF (HEART FAILURE WITH REDUCED EJECTION FRACTION) (HCC): ICD-10-CM

## 2025-01-23 DIAGNOSIS — I08.0 MITRAL AND AORTIC VALVE DISEASE: ICD-10-CM

## 2025-01-23 PROCEDURE — 99214 OFFICE O/P EST MOD 30 MIN: CPT

## 2025-01-23 NOTE — PROGRESS NOTES
Cricket Reyes  1975  [id number]  [facility] CARDIOLOGY [address] [address] [phone]    1. Paroxysmal atrial flutter (HCC)        2. Dilated aortic root (HCC)        3. Mitral and aortic valve disease        4. Chronic HFrEF (heart failure with reduced ejection fraction) (HCC)  NM myocardial perfusion spect (stress and/or rest)    B-Type Natriuretic Peptide(BNP)    REMBERTO Screen w/Reflex Cascade    TSH w/Reflex        Assessment/Plan  TODAY (01/23/25):   TTE shows further reduction in EF to 32%. Will check a stress test, BNP, REMBERTO, and TSH. If NM stress negative, would do cardiac MRI.   Advised to take it easy at the gym and work until we work up this new cardiomyopathy.  He appears overall euvolemic on exam.   RTO 04/02/25 with Dr. Graff to review results.   Chronic HFrEf, etiology unknown possibly tachy mediated  - TTE 12/12/24; LVEF 32%, LVIDD 7.1 cm with global hypokinesis, grade II DD, moderate to severe LAE, aortic valve sclerosis, mitral leaflet sclerosis, ascending aortic root 4.2 cm   - TTE 02/05/24: LVEF 40%, LVIDD 6.9 cm  - office weight 10/16/24: 209 lbs  - office weight today: 208 lbs   - Neurohormonal Blockade:   -- not on any GDMT  Paroxysmal atrial flutter  - diagnosed January 2024 in the setting of GI illness   - s/p cardioversion in the ER room   - not on rate control or AC   - [id number]: 1 (CHF)  Dilated aortic root  - TTE 12/12/24 with ascending aortic root 4.2 cm      Interval History:   This is a 48 y/o male with a PMH of chronic HFrEF (possibly tachy mediated), paroxsymal atrial flutter, and dilated aortic root who is presenting today for follow up. He was last seen in office 10/16/24 by Dr. Graff. At this visit, he was feeling overall well well and had no cardiac concerns. Repeat TTE ordered which showed worsening ejection fraction.    Pt states since the last time he saw Dr. Graff he has been doing well. He has been back in the gym  on his full workout without doing cardio. He has not felt any CHF symptoms including weight gain, edema, PND, CLAYTON/SOB, chest discomfort, or abdominal bloating. He has had other family members cardiac issues but more related to drug use. He himself denies any heavy alcohol, drug, tobacco, testosterone, or steroid use. He denies history of HIV or Lyme's disease, or viral illness. No family history of rheumatological diseases. 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    Past Medical History:   Diagnosis Date   • Asthma    • Heart murmur      Social History     Socioeconomic History   • Marital status: Single     Spouse name: Not on file   • Number of children: Not on file   • Years of education: Not on file   • Highest education level: Not on file   Occupational History   • Not on file   Tobacco Use   • Smoking status: Never     Passive exposure: Never   • Smokeless tobacco: Never   Vaping Use   • Vaping status: Never Used   Substance and Sexual Activity   • Alcohol use: Yes     Comment: social   • Drug use: Not Currently   • Sexual activity: Not on file   Other Topics Concern   • Not on file   Social History Narrative   • Not on file     Social Drivers of Health     Financial Resource Strain: Not on file   Food Insecurity: No Food Insecurity (2/5/2024)    Nursing - Inadequate Food Risk Classification    • Worried About Running Out of Food in the Last Year: Never true    • Ran Out of Food in the Last Year: Never true    • Ran Out of Food in the Last Year: Not on file   Transportation Needs: No Transportation Needs (2/5/2024)    PRAPARE - Transportation    • Lack of Transportation (Medical): No    • Lack of Transportation (Non-Medical): No   Physical Activity: Not on file   Stress: Not on file   Social Connections: Not on file   Intimate Partner Violence: Not on file   Housing Stability: Low Risk  (2/5/2024)    Housing Stability  Vital Sign    • Unable to Pay for Housing in the Last Year: No    • Number of Times Moved in the Last Year: 1    • Homeless in the Last Year: No      Family History   Problem Relation Age of Onset   • Arthritis Father    • LUDA disease Father    • Cancer Sister    • Epididymitis Daughter    • Diabetes Paternal Aunt    • Diabetes Paternal Uncle      Past Surgical History:   Procedure Laterality Date   • ARTHROSCOPIC REPAIR ACL Right    • CHOLECYSTECTOMY LAPAROSCOPIC N/A 2/4/2024    Procedure: CHOLECYSTECTOMY LAPAROSCOPIC, intra op cholangiogram;  Surgeon: Siddharth White DO;  [facility];  Service: General   • HERNIA REPAIR         Current Outpatient Medications:   •  multivitamin (THERAGRAN) TABS, Take 1 tablet by mouth daily, Disp: , Rfl:   •  hydrocortisone 2.5 % ointment, Apply topically 3 (three) times a day (Patient not taking: Reported on 1/23/2025), Disp: 28.35 g, Rfl: 0  •  naproxen (Naprosyn) 500 mg tablet, Take 1 tablet (500 mg total) by mouth 2 (two) times a day with meals (Patient not taking: Reported on 1/23/2025), Disp: 30 tablet, Rfl: 0  Allergies   Allergen Reactions   • Xarelto [Rivaroxaban] Rash       Labs:     Chemistry        Component Value Date/Time    K 4.6 02/05/2024 0550     02/05/2024 0550    CO2 22 02/05/2024 0550    BUN 12 02/05/2024 0550    CREATININE 1.14 02/05/2024 0550        Component Value Date/Time    CALCIUM 8.6 02/05/2024 0550    ALKPHOS 54 02/05/2024 0550    AST 42 (H) 02/05/2024 0550    ALT 52 02/05/2024 0550        Lab Results   Component Value Date    HDL 27 (L) 02/04/2024     Lab Results   Component Value Date    LDLCALC 83 02/04/2024     Lab Results   Component Value Date    TRIG 117 02/04/2024       Imaging: No results found.    Review of Systems   Constitutional: Positive for malaise/fatigue. Negative for chills, diaphoresis, fever and weight gain.   Cardiovascular:  Negative for chest pain, dyspnea on exertion, irregular heartbeat, leg swelling,  "near-syncope, orthopnea, palpitations, paroxysmal nocturnal dyspnea and syncope.   Respiratory:  Negative for cough, shortness of breath, sleep disturbances due to breathing, snoring and wheezing.    Skin:  Negative for rash.   Gastrointestinal:  Negative for bloating, abdominal pain and nausea.   Neurological:  Negative for dizziness and light-headedness.       Vitals:    01/23/25 1402   BP: 138/70   Pulse: 62   SpO2: 98%     Vitals:    01/23/25 1402   Weight: 94.6 kg (208 lb 9.6 oz)     Height: 5' 4\" (162.6 cm)   Body mass index is 35.81 kg/m².    Physical Exam  Vitals and nursing note reviewed.   Constitutional:       General: He is not in acute distress.     Appearance: Normal appearance. He is not ill-appearing.   HENT:      Head: Normocephalic and atraumatic.      Nose: Nose normal.      Mouth/Throat:      Mouth: Mucous membranes are moist.   Eyes:      Conjunctiva/sclera: Conjunctivae normal.   Cardiovascular:      Rate and Rhythm: Normal rate and regular rhythm.      Pulses:           Radial pulses are 2+ on the right side and 2+ on the left side.      Heart sounds: S1 normal and S2 normal. No murmur heard.  Pulmonary:      Effort: Pulmonary effort is normal. No respiratory distress.      Breath sounds: Normal breath sounds. No stridor. No wheezing, rhonchi or rales.   Abdominal:      General: There is no distension.   Musculoskeletal:      Cervical back: Neck supple.      Right lower leg: No edema.      Left lower leg: No edema.   Skin:     General: Skin is warm.      Capillary Refill: Capillary refill takes less than 2 seconds.   Neurological:      General: No focal deficit present.      Mental Status: He is alert.   Psychiatric:         Thought Content: Thought content normal.        "

## 2025-01-23 NOTE — H&P (VIEW-ONLY)
Cricket Reyes  1975  [id number]  [facility] CARDIOLOGY [address] [address] [phone]    1. Paroxysmal atrial flutter (HCC)        2. Dilated aortic root (HCC)        3. Mitral and aortic valve disease        4. Chronic HFrEF (heart failure with reduced ejection fraction) (HCC)  NM myocardial perfusion spect (stress and/or rest)    B-Type Natriuretic Peptide(BNP)    REMBERTO Screen w/Reflex Cascade    TSH w/Reflex        Assessment/Plan  TODAY (01/23/25):   TTE shows further reduction in EF to 32%. Will check a stress test, BNP, REMBERTO, and TSH. If NM stress negative, would do cardiac MRI.   Advised to take it easy at the gym and work until we work up this new cardiomyopathy.  He appears overall euvolemic on exam.   RTO 04/02/25 with Dr. Graff to review results.   Chronic HFrEf, etiology unknown possibly tachy mediated  - TTE 12/12/24; LVEF 32%, LVIDD 7.1 cm with global hypokinesis, grade II DD, moderate to severe LAE, aortic valve sclerosis, mitral leaflet sclerosis, ascending aortic root 4.2 cm   - TTE 02/05/24: LVEF 40%, LVIDD 6.9 cm  - office weight 10/16/24: 209 lbs  - office weight today: 208 lbs   - Neurohormonal Blockade:   -- not on any GDMT  Paroxysmal atrial flutter  - diagnosed January 2024 in the setting of GI illness   - s/p cardioversion in the ER room   - not on rate control or AC   - [id number]: 1 (CHF)  Dilated aortic root  - TTE 12/12/24 with ascending aortic root 4.2 cm      Interval History:   This is a 50 y/o male with a PMH of chronic HFrEF (possibly tachy mediated), paroxsymal atrial flutter, and dilated aortic root who is presenting today for follow up. He was last seen in office 10/16/24 by Dr. Graff. At this visit, he was feeling overall well well and had no cardiac concerns. Repeat TTE ordered which showed worsening ejection fraction.    Pt states since the last time he saw Dr. Graff he has been doing well. He has been back in the gym  on his full workout without doing cardio. He has not felt any CHF symptoms including weight gain, edema, PND, CLAYTON/SOB, chest discomfort, or abdominal bloating. He has had other family members cardiac issues but more related to drug use. He himself denies any heavy alcohol, drug, tobacco, testosterone, or steroid use. He denies history of HIV or Lyme's disease, or viral illness. No family history of rheumatological diseases. 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    Past Medical History:   Diagnosis Date   • Asthma    • Heart murmur      Social History     Socioeconomic History   • Marital status: Single     Spouse name: Not on file   • Number of children: Not on file   • Years of education: Not on file   • Highest education level: Not on file   Occupational History   • Not on file   Tobacco Use   • Smoking status: Never     Passive exposure: Never   • Smokeless tobacco: Never   Vaping Use   • Vaping status: Never Used   Substance and Sexual Activity   • Alcohol use: Yes     Comment: social   • Drug use: Not Currently   • Sexual activity: Not on file   Other Topics Concern   • Not on file   Social History Narrative   • Not on file     Social Drivers of Health     Financial Resource Strain: Not on file   Food Insecurity: No Food Insecurity (2/5/2024)    Nursing - Inadequate Food Risk Classification    • Worried About Running Out of Food in the Last Year: Never true    • Ran Out of Food in the Last Year: Never true    • Ran Out of Food in the Last Year: Not on file   Transportation Needs: No Transportation Needs (2/5/2024)    PRAPARE - Transportation    • Lack of Transportation (Medical): No    • Lack of Transportation (Non-Medical): No   Physical Activity: Not on file   Stress: Not on file   Social Connections: Not on file   Intimate Partner Violence: Not on file   Housing Stability: Low Risk  (2/5/2024)    Housing Stability  Vital Sign    • Unable to Pay for Housing in the Last Year: No    • Number of Times Moved in the Last Year: 1    • Homeless in the Last Year: No      Family History   Problem Relation Age of Onset   • Arthritis Father    • LUDA disease Father    • Cancer Sister    • Epididymitis Daughter    • Diabetes Paternal Aunt    • Diabetes Paternal Uncle      Past Surgical History:   Procedure Laterality Date   • ARTHROSCOPIC REPAIR ACL Right    • CHOLECYSTECTOMY LAPAROSCOPIC N/A 2/4/2024    Procedure: CHOLECYSTECTOMY LAPAROSCOPIC, intra op cholangiogram;  Surgeon: Siddharth White DO;  Location: [facility];  Service: General   • HERNIA REPAIR         Current Outpatient Medications:   •  multivitamin (THERAGRAN) TABS, Take 1 tablet by mouth daily, Disp: , Rfl:   •  hydrocortisone 2.5 % ointment, Apply topically 3 (three) times a day (Patient not taking: Reported on 1/23/2025), Disp: 28.35 g, Rfl: 0  •  naproxen (Naprosyn) 500 mg tablet, Take 1 tablet (500 mg total) by mouth 2 (two) times a day with meals (Patient not taking: Reported on 1/23/2025), Disp: 30 tablet, Rfl: 0  Allergies   Allergen Reactions   • Xarelto [Rivaroxaban] Rash       Labs:     Chemistry        Component Value Date/Time    K 4.6 02/05/2024 0550     02/05/2024 0550    CO2 22 02/05/2024 0550    BUN 12 02/05/2024 0550    CREATININE 1.14 02/05/2024 0550        Component Value Date/Time    CALCIUM 8.6 02/05/2024 0550    ALKPHOS 54 02/05/2024 0550    AST 42 (H) 02/05/2024 0550    ALT 52 02/05/2024 0550        Lab Results   Component Value Date    HDL 27 (L) 02/04/2024     Lab Results   Component Value Date    LDLCALC 83 02/04/2024     Lab Results   Component Value Date    TRIG 117 02/04/2024       Imaging: No results found.    Review of Systems   Constitutional: Positive for malaise/fatigue. Negative for chills, diaphoresis, fever and weight gain.   Cardiovascular:  Negative for chest pain, dyspnea on exertion, irregular heartbeat, leg swelling,  "near-syncope, orthopnea, palpitations, paroxysmal nocturnal dyspnea and syncope.   Respiratory:  Negative for cough, shortness of breath, sleep disturbances due to breathing, snoring and wheezing.    Skin:  Negative for rash.   Gastrointestinal:  Negative for bloating, abdominal pain and nausea.   Neurological:  Negative for dizziness and light-headedness.       Vitals:    01/23/25 1402   BP: 138/70   Pulse: 62   SpO2: 98%     Vitals:    01/23/25 1402   Weight: 94.6 kg (208 lb 9.6 oz)     Height: 5' 4\" (162.6 cm)   Body mass index is 35.81 kg/m².    Physical Exam  Vitals and nursing note reviewed.   Constitutional:       General: He is not in acute distress.     Appearance: Normal appearance. He is not ill-appearing.   HENT:      Head: Normocephalic and atraumatic.      Nose: Nose normal.      Mouth/Throat:      Mouth: Mucous membranes are moist.   Eyes:      Conjunctiva/sclera: Conjunctivae normal.   Cardiovascular:      Rate and Rhythm: Normal rate and regular rhythm.      Pulses:           Radial pulses are 2+ on the right side and 2+ on the left side.      Heart sounds: S1 normal and S2 normal. No murmur heard.  Pulmonary:      Effort: Pulmonary effort is normal. No respiratory distress.      Breath sounds: Normal breath sounds. No stridor. No wheezing, rhonchi or rales.   Abdominal:      General: There is no distension.   Musculoskeletal:      Cervical back: Neck supple.      Right lower leg: No edema.      Left lower leg: No edema.   Skin:     General: Skin is warm.      Capillary Refill: Capillary refill takes less than 2 seconds.   Neurological:      General: No focal deficit present.      Mental Status: He is alert.   Psychiatric:         Thought Content: Thought content normal.        "

## 2025-01-27 ENCOUNTER — APPOINTMENT (OUTPATIENT)
Dept: LAB | Facility: MEDICAL CENTER | Age: 50
End: 2025-01-27
Payer: COMMERCIAL

## 2025-01-27 DIAGNOSIS — I50.22 CHRONIC HFREF (HEART FAILURE WITH REDUCED EJECTION FRACTION) (HCC): ICD-10-CM

## 2025-01-27 DIAGNOSIS — Z13.6 SCREENING FOR CARDIOVASCULAR CONDITION: ICD-10-CM

## 2025-01-27 LAB
BNP SERPL-MCNC: 332 PG/ML (ref 0–100)
TSH SERPL DL<=0.05 MIU/L-ACNC: 3.04 UIU/ML (ref 0.45–4.5)

## 2025-01-27 PROCEDURE — 84443 ASSAY THYROID STIM HORMONE: CPT

## 2025-01-27 PROCEDURE — 86038 ANTINUCLEAR ANTIBODIES: CPT

## 2025-01-27 PROCEDURE — 36415 COLL VENOUS BLD VENIPUNCTURE: CPT

## 2025-01-27 PROCEDURE — 83880 ASSAY OF NATRIURETIC PEPTIDE: CPT

## 2025-01-27 PROCEDURE — 86225 DNA ANTIBODY NATIVE: CPT

## 2025-01-28 ENCOUNTER — TELEPHONE (OUTPATIENT)
Dept: CARDIOLOGY CLINIC | Facility: CLINIC | Age: 50
End: 2025-01-28

## 2025-01-28 ENCOUNTER — TELEPHONE (OUTPATIENT)
Age: 50
End: 2025-01-28

## 2025-01-28 ENCOUNTER — RESULTS FOLLOW-UP (OUTPATIENT)
Dept: CARDIOLOGY CLINIC | Facility: CLINIC | Age: 50
End: 2025-01-28

## 2025-01-28 DIAGNOSIS — I50.22 CHRONIC HFREF (HEART FAILURE WITH REDUCED EJECTION FRACTION) (HCC): Primary | ICD-10-CM

## 2025-01-28 LAB
DSDNA IGG SERPL IA-ACNC: 1.8 IU/ML (ref ?–15)
NUCLEAR IGG SER IA-RTO: 0.2 RATIO (ref ?–1)

## 2025-01-28 RX ORDER — FUROSEMIDE 20 MG/1
20 TABLET ORAL DAILY
Qty: 30 TABLET | Refills: 3 | Status: ON HOLD | OUTPATIENT
Start: 2025-01-28 | End: 2025-02-14

## 2025-01-28 NOTE — TELEPHONE ENCOUNTER
Patient calling to ask if he should take his Furosemide before or after his scheduled stress test on Friday 1/31/25. Instructed patient if he is feeling well and not retaining fluid, he can wait until after stress test is completed to take furosemide on Friday.

## 2025-01-28 NOTE — TELEPHONE ENCOUNTER
PC to patient leaving a message on answering machine that Cardiology is calling and that  Diandra has his blood test results and comments for him.  He is to call back our direct line at [phone] aoption 4 and ask for Dayanna who will relay message.

## 2025-01-28 NOTE — TELEPHONE ENCOUNTER
PC to Cricket with Diandra's message about his lab work results.  Thyroid and REMBERTO was normal but BNP was elevated and asked him to  prescription at pharmacy for Lasix 20 mgs qd.  He is to get labs done for kidney function in one week.  Patient states he understands directions and will comply.

## 2025-01-31 ENCOUNTER — HOSPITAL ENCOUNTER (OUTPATIENT)
Dept: NON INVASIVE DIAGNOSTICS | Facility: CLINIC | Age: 50
Discharge: HOME | End: 2025-01-31
Payer: COMMERCIAL

## 2025-01-31 VITALS — HEIGHT: 64 IN | BODY MASS INDEX: 35.51 KG/M2 | WEIGHT: 208 LBS

## 2025-01-31 DIAGNOSIS — I50.22 CHRONIC HFREF (HEART FAILURE WITH REDUCED EJECTION FRACTION) (HCC): ICD-10-CM

## 2025-01-31 LAB
CHEST PAIN STATEMENT: NORMAL
MAX DIASTOLIC BP: 88 MMHG
MAX HR PERCENT: 91 %
MAX HR: 155 BPM
MAX PREDICTED HEART RATE: 170 BPM
PROTOCOL NAME: NORMAL
RATE PRESSURE PRODUCT: NORMAL
REASON FOR TERMINATION: NORMAL
SL CV REST NUCLEAR ISOTOPE DOSE: 10.1 MCI
SL CV STRESS NUCLEAR ISOTOPE DOSE: 32.2 MCI
SL CV STRESS RECOVERY BP: NORMAL MMHG
SL CV STRESS RECOVERY HR: 87 BPM
SL CV STRESS RECOVERY O2 SAT: 99 %
SL CV STRESS STAGE REACHED: 3
SPECT HRT GATED+EF W RNC IV: 25 %
STRESS ANGINA INDEX: 0
STRESS BASELINE BP: NORMAL MMHG
STRESS BASELINE HR: 67 BPM
STRESS O2 SAT REST: 99 %
STRESS PEAK HR: 155 BPM
STRESS POST ESTIMATED WORKLOAD: 10.1 METS
STRESS POST EXERCISE DUR MIN: 7 MIN
STRESS POST EXERCISE DUR MIN: 7 MIN
STRESS POST EXERCISE DUR SEC: 30 SEC
STRESS POST EXERCISE DUR SEC: 30 SEC
STRESS POST O2 SAT PEAK: 97 %
STRESS POST PEAK BP: 158 MMHG
STRESS POST PEAK HR: 155 BPM
STRESS POST PEAK SYSTOLIC BP: 162 MMHG
STRESS/REST PERFUSION RATIO: 1.12
TARGET HR FORMULA: NORMAL
TEST INDICATION: NORMAL

## 2025-01-31 PROCEDURE — A9502 TC99M TETROFOSMIN: HCPCS

## 2025-01-31 PROCEDURE — 93016 CV STRESS TEST SUPVJ ONLY: CPT | Performed by: STUDENT IN AN ORGANIZED HEALTH CARE EDUCATION/TRAINING PROGRAM

## 2025-01-31 PROCEDURE — 78452 HT MUSCLE IMAGE SPECT MULT: CPT

## 2025-01-31 PROCEDURE — 93017 CV STRESS TEST TRACING ONLY: CPT

## 2025-01-31 PROCEDURE — 93018 CV STRESS TEST I&R ONLY: CPT | Performed by: STUDENT IN AN ORGANIZED HEALTH CARE EDUCATION/TRAINING PROGRAM

## 2025-01-31 PROCEDURE — 78452 HT MUSCLE IMAGE SPECT MULT: CPT | Performed by: STUDENT IN AN ORGANIZED HEALTH CARE EDUCATION/TRAINING PROGRAM

## 2025-02-03 ENCOUNTER — TELEPHONE (OUTPATIENT)
Dept: CARDIOLOGY CLINIC | Facility: CLINIC | Age: 50
End: 2025-02-03

## 2025-02-03 DIAGNOSIS — R94.39 ABNORMAL STRESS TEST: Primary | ICD-10-CM

## 2025-02-03 LAB
CHEST PAIN STATEMENT: NORMAL
MAX DIASTOLIC BP: 88 MMHG
MAX PREDICTED HEART RATE: 170 BPM
PROTOCOL NAME: NORMAL
REASON FOR TERMINATION: NORMAL
STRESS POST EXERCISE DUR MIN: 7 MIN
STRESS POST EXERCISE DUR SEC: 30 SEC
STRESS POST PEAK HR: 155 BPM
STRESS POST PEAK SYSTOLIC BP: 162 MMHG
TARGET HR FORMULA: NORMAL
TEST INDICATION: NORMAL

## 2025-02-03 RX ORDER — ASPIRIN 81 MG/1
81 TABLET, CHEWABLE ORAL DAILY
Qty: 90 TABLET | Refills: 0 | Status: SHIPPED | OUTPATIENT
Start: 2025-02-03

## 2025-02-03 RX ORDER — ATORVASTATIN CALCIUM 40 MG/1
40 TABLET, FILM COATED ORAL DAILY
Qty: 90 TABLET | Refills: 0 | Status: SHIPPED | OUTPATIENT
Start: 2025-02-03

## 2025-02-03 NOTE — TELEPHONE ENCOUNTER
Attempted to call patient to discuss abnormal stress test but went to voicemail.     Clinical staff: If pt calls back please advise that is stress test is abnormal and next steps is cardiac catheterization. This is a minimally invasive, low risk procedure that involves a small incision in the wrist or groin and uses wires/catheters to look directly at the coronary arteries to assess for any blockages. Risks of the procedure include bleeding, infection, kidney damage from contrast dye, MI, and stoke. Please let me know if he is willing to proceed with this. If there is a blockage, it may be contributing to his low EF. In the meantime, please have him start ASA 81 mg daily and atorvastatin 40 mg daily. Prescriptions will be sent to the pharmacy.    Thanks,     Diandra

## 2025-02-05 ENCOUNTER — APPOINTMENT (OUTPATIENT)
Dept: LAB | Facility: MEDICAL CENTER | Age: 50
End: 2025-02-05
Payer: COMMERCIAL

## 2025-02-05 ENCOUNTER — PREP FOR PROCEDURE (OUTPATIENT)
Dept: CARDIOLOGY CLINIC | Facility: CLINIC | Age: 50
End: 2025-02-05

## 2025-02-05 DIAGNOSIS — I50.22 CHRONIC HFREF (HEART FAILURE WITH REDUCED EJECTION FRACTION) (HCC): ICD-10-CM

## 2025-02-05 DIAGNOSIS — R94.39 ABNORMAL STRESS TEST: Primary | ICD-10-CM

## 2025-02-05 LAB
ANION GAP SERPL CALCULATED.3IONS-SCNC: 9 MMOL/L (ref 4–13)
BUN SERPL-MCNC: 19 MG/DL (ref 5–25)
CALCIUM SERPL-MCNC: 9.3 MG/DL (ref 8.4–10.2)
CHLORIDE SERPL-SCNC: 107 MMOL/L (ref 96–108)
CO2 SERPL-SCNC: 27 MMOL/L (ref 21–32)
CREAT SERPL-MCNC: 1.3 MG/DL (ref 0.6–1.3)
GFR SERPL CREATININE-BSD FRML MDRD: 63 ML/MIN/1.73SQ M
GLUCOSE P FAST SERPL-MCNC: 96 MG/DL (ref 65–99)
POTASSIUM SERPL-SCNC: 4.7 MMOL/L (ref 3.5–5.3)
SODIUM SERPL-SCNC: 143 MMOL/L (ref 135–147)

## 2025-02-05 PROCEDURE — 36415 COLL VENOUS BLD VENIPUNCTURE: CPT

## 2025-02-05 PROCEDURE — 80048 BASIC METABOLIC PNL TOTAL CA: CPT

## 2025-02-05 RX ORDER — SODIUM CHLORIDE 9 MG/ML
100 INJECTION, SOLUTION INTRAVENOUS CONTINUOUS
Status: CANCELLED | OUTPATIENT
Start: 2025-02-05 | End: 2025-02-05

## 2025-02-05 RX ORDER — ASPIRIN 81 MG/1
324 TABLET, CHEWABLE ORAL ONCE
Status: CANCELLED | OUTPATIENT
Start: 2025-02-05 | End: 2025-02-05

## 2025-02-05 NOTE — TELEPHONE ENCOUNTER
Patient returned call states has been working and can not have phone at work.Pt states willing to have a cardiac cath and would like to schedule..To the best of my knowledge tried to answer the patients questions.  Pt states will be available all day tomorrow or Monday to discuss and schedule the procedure.  Pt will  rxs ordered.  Pt states will have work schedule available when called and would prefer a Friday to have procedure.  Orders need to be placed.for procedure.

## 2025-02-05 NOTE — TELEPHONE ENCOUNTER
CTS can Discuss.     Diandra asked if we could please try and contact patient again in regards to the next steps after having Abn. Stress test. Patient needs cardiac cath.     Attempted to reach Mr. Reyes and there was no answer. Left voicemail to have patient return office phone call.

## 2025-02-06 ENCOUNTER — TELEPHONE (OUTPATIENT)
Dept: CARDIOLOGY CLINIC | Facility: CLINIC | Age: 50
End: 2025-02-06

## 2025-02-06 ENCOUNTER — RESULTS FOLLOW-UP (OUTPATIENT)
Dept: CARDIOLOGY CLINIC | Facility: CLINIC | Age: 50
End: 2025-02-06

## 2025-02-06 DIAGNOSIS — I08.0 MITRAL AND AORTIC VALVE DISEASE: ICD-10-CM

## 2025-02-06 DIAGNOSIS — I42.9 CARDIOMYOPATHY, UNSPECIFIED TYPE (HCC): ICD-10-CM

## 2025-02-06 DIAGNOSIS — R94.39 ABNORMAL STRESS TEST: Primary | ICD-10-CM

## 2025-02-06 DIAGNOSIS — I51.7 BIATRIAL ENLARGEMENT: ICD-10-CM

## 2025-02-06 DIAGNOSIS — I48.92 PAROXYSMAL ATRIAL FLUTTER (HCC): ICD-10-CM

## 2025-02-06 DIAGNOSIS — I50.22 CHRONIC HFREF (HEART FAILURE WITH REDUCED EJECTION FRACTION) (HCC): ICD-10-CM

## 2025-02-06 DIAGNOSIS — I77.810 DILATED AORTIC ROOT (HCC): ICD-10-CM

## 2025-02-06 NOTE — LETTER
2025       Cricket Reyes              : 1975        MRN: [id number]  [address]   [address]  [facility] [address]       Procedure Name: LEFT HEART CATHETERIZATION    Procedure date: 25    Location: [facility]      The [facility] will contact you the day prior to your procedure, usually between 4PM - 6PM to instruct you on the time and place to report. If you do not hear from a [facility]  by 6PM the evening prior to your procedure, please contact the campus that you are scheduled at.      [facility]: [address] [place] [address] - Same Day Surgery [phone]       You may have NOTHING to eat or drink from midnight the night prior to your procedure. You may have a minimal amount of water with your morning medications.     DO NOT stop taking Plavix or Aspirin unless advised otherwise.    Arrange for a responsible person to drive you to and from the hospital.    Please shower/bathe the night before your procedure and do not use powders or lotions.    Please notify us if you have been admitted to the hospital within the past 30 days.    Bring a list of daily medications, vitamins, minerals, herbals and nutritional supplements you take. Include dosage and time you take them each day.    If packing an overnight bag, pack minimal clothing, you will be given hospital sleepwear. Do not bring money, valuables or jewelry. Wedding band is OK.    If you use CPAP machine, bring it to the hospital.      Have your Photo ID and Insurance cards with you.                DO NOT take any diabetic medication, including insulin, the morning of the procedure. Oral diabetic medications may include: Glucophage, Prandin, Glyburide, Micronase, Avandia, Glocovance, Precose, Glynase y Starlix.    You should continue to take your morning dose of heart and/or blood pressure medications with a sip of water UNLESS ADVISED OTHERWISE.    Special Instructions:    Medication hold:      Furosemide (Lasix) - Do not take morning of procedure.         Blood work to be done NO LATER THAN 2/10/25       Thank you,  Tracy Eduardo  Surgery Coordinator  [facility]   [address] [place] [address]  [place] [address]  Teams: [phone]

## 2025-02-06 NOTE — TELEPHONE ENCOUNTER
Patient scheduled for LEFT Heart Cath on 2/14/25 at [facility] [place] [place] with Dr. CLARK.        2/6/25 Mailed patient instructions.     Patient aware of all general instructions.    Medication holds:     Furosemide (Lasix) - Do not take morning of procedure.     Labs to be done BMP DONE ON 2/5/25   WILL HAVE CBC DONE NO LATER THAN 2/10/25  CMP / CBC (fasting 8 hours)        Carla, when you come into the office this afternoon can you please mail pt his instructions.     Thank you,   Tracy

## 2025-02-10 ENCOUNTER — APPOINTMENT (OUTPATIENT)
Dept: LAB | Facility: MEDICAL CENTER | Age: 50
End: 2025-02-10
Payer: COMMERCIAL

## 2025-02-10 ENCOUNTER — APPOINTMENT (OUTPATIENT)
Dept: LAB | Facility: HOSPITAL | Age: 50
End: 2025-02-10
Payer: COMMERCIAL

## 2025-02-10 DIAGNOSIS — I50.22 CHRONIC HFREF (HEART FAILURE WITH REDUCED EJECTION FRACTION) (HCC): ICD-10-CM

## 2025-02-10 DIAGNOSIS — R94.39 ABNORMAL STRESS TEST: ICD-10-CM

## 2025-02-10 DIAGNOSIS — I48.92 PAROXYSMAL ATRIAL FLUTTER (HCC): ICD-10-CM

## 2025-02-10 DIAGNOSIS — I77.810 DILATED AORTIC ROOT (HCC): ICD-10-CM

## 2025-02-10 DIAGNOSIS — I08.0 MITRAL AND AORTIC VALVE DISEASE: ICD-10-CM

## 2025-02-10 DIAGNOSIS — I51.7 BIATRIAL ENLARGEMENT: ICD-10-CM

## 2025-02-10 DIAGNOSIS — I42.9 CARDIOMYOPATHY, UNSPECIFIED TYPE (HCC): ICD-10-CM

## 2025-02-10 LAB
BASOPHILS # BLD AUTO: 0.03 THOUSANDS/ÂΜL (ref 0–0.1)
BASOPHILS NFR BLD AUTO: 1 % (ref 0–1)
EOSINOPHIL # BLD AUTO: 0.12 THOUSAND/ÂΜL (ref 0–0.61)
EOSINOPHIL NFR BLD AUTO: 2 % (ref 0–6)
ERYTHROCYTE [DISTWIDTH] IN BLOOD BY AUTOMATED COUNT: 13.4 % (ref 11.6–15.1)
HCT VFR BLD AUTO: 43.3 % (ref 36.5–49.3)
HGB BLD-MCNC: 14.8 G/DL (ref 12–17)
IMM GRANULOCYTES # BLD AUTO: 0.01 THOUSAND/UL (ref 0–0.2)
IMM GRANULOCYTES NFR BLD AUTO: 0 % (ref 0–2)
LYMPHOCYTES # BLD AUTO: 1.99 THOUSANDS/ÂΜL (ref 0.6–4.47)
LYMPHOCYTES NFR BLD AUTO: 31 % (ref 14–44)
MCH RBC QN AUTO: 31.2 PG (ref 26.8–34.3)
MCHC RBC AUTO-ENTMCNC: 34.2 G/DL (ref 31.4–37.4)
MCV RBC AUTO: 91 FL (ref 82–98)
MONOCYTES # BLD AUTO: 0.7 THOUSAND/ÂΜL (ref 0.17–1.22)
MONOCYTES NFR BLD AUTO: 11 % (ref 4–12)
NEUTROPHILS # BLD AUTO: 3.58 THOUSANDS/ÂΜL (ref 1.85–7.62)
NEUTS SEG NFR BLD AUTO: 55 % (ref 43–75)
NRBC BLD AUTO-RTO: 0 /100 WBCS
PLATELET # BLD AUTO: 241 THOUSANDS/UL (ref 149–390)
PMV BLD AUTO: 11.2 FL (ref 8.9–12.7)
PSA SERPL-MCNC: 0.59 NG/ML (ref 0–4)
RBC # BLD AUTO: 4.74 MILLION/UL (ref 3.88–5.62)
WBC # BLD AUTO: 6.43 THOUSAND/UL (ref 4.31–10.16)

## 2025-02-10 PROCEDURE — G0103 PSA SCREENING: HCPCS

## 2025-02-10 PROCEDURE — 85025 COMPLETE CBC W/AUTO DIFF WBC: CPT

## 2025-02-10 PROCEDURE — 80061 LIPID PANEL: CPT

## 2025-02-10 PROCEDURE — 80053 COMPREHEN METABOLIC PANEL: CPT

## 2025-02-11 ENCOUNTER — TELEPHONE (OUTPATIENT)
Age: 50
End: 2025-02-11

## 2025-02-11 LAB
ALBUMIN SERPL BCG-MCNC: 4.7 G/DL (ref 3.5–5)
ALP SERPL-CCNC: 57 U/L (ref 34–104)
ALT SERPL W P-5'-P-CCNC: 24 U/L (ref 7–52)
ANION GAP SERPL CALCULATED.3IONS-SCNC: 7 MMOL/L (ref 4–13)
AST SERPL W P-5'-P-CCNC: 20 U/L (ref 13–39)
BILIRUB SERPL-MCNC: 0.72 MG/DL (ref 0.2–1)
BUN SERPL-MCNC: 23 MG/DL (ref 5–25)
CALCIUM SERPL-MCNC: 9.5 MG/DL (ref 8.4–10.2)
CHLORIDE SERPL-SCNC: 106 MMOL/L (ref 96–108)
CHOLEST SERPL-MCNC: 159 MG/DL (ref ?–200)
CO2 SERPL-SCNC: 27 MMOL/L (ref 21–32)
CREAT SERPL-MCNC: 1.23 MG/DL (ref 0.6–1.3)
GFR SERPL CREATININE-BSD FRML MDRD: 68 ML/MIN/1.73SQ M
GLUCOSE P FAST SERPL-MCNC: 97 MG/DL (ref 65–99)
HDLC SERPL-MCNC: 42 MG/DL
LDLC SERPL CALC-MCNC: 93 MG/DL (ref 0–100)
POTASSIUM SERPL-SCNC: 4.7 MMOL/L (ref 3.5–5.3)
PROT SERPL-MCNC: 7.6 G/DL (ref 6.4–8.4)
SODIUM SERPL-SCNC: 140 MMOL/L (ref 135–147)
TRIGL SERPL-MCNC: 119 MG/DL (ref ?–150)

## 2025-02-11 NOTE — TELEPHONE ENCOUNTER
Patient is scheduled for cardiac cath on 2/14.    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  He asked if you think it is likely he will be able to work on Monday, 2/17.

## 2025-02-14 ENCOUNTER — HOSPITAL ENCOUNTER (OUTPATIENT)
Facility: HOSPITAL | Age: 50
Setting detail: OUTPATIENT SURGERY
Discharge: HOME | End: 2025-02-14
Attending: INTERNAL MEDICINE | Admitting: INTERNAL MEDICINE
Payer: COMMERCIAL

## 2025-02-14 VITALS
RESPIRATION RATE: 20 BRPM | BODY MASS INDEX: 35.76 KG/M2 | WEIGHT: 208.34 LBS | HEART RATE: 68 BPM | TEMPERATURE: 97.8 F | SYSTOLIC BLOOD PRESSURE: 121 MMHG | DIASTOLIC BLOOD PRESSURE: 61 MMHG | OXYGEN SATURATION: 97 %

## 2025-02-14 DIAGNOSIS — R94.39 ABNORMAL STRESS TEST: ICD-10-CM

## 2025-02-14 DIAGNOSIS — I50.22 CHRONIC HFREF (HEART FAILURE WITH REDUCED EJECTION FRACTION) (HCC): Primary | ICD-10-CM

## 2025-02-14 LAB
ANION GAP SERPL CALCULATED.3IONS-SCNC: 8 MMOL/L (ref 4–13)
ATRIAL RATE: 67 BPM
BUN SERPL-MCNC: 20 MG/DL (ref 5–25)
CALCIUM SERPL-MCNC: 9 MG/DL (ref 8.4–10.2)
CHLORIDE SERPL-SCNC: 107 MMOL/L (ref 96–108)
CO2 SERPL-SCNC: 23 MMOL/L (ref 21–32)
CREAT SERPL-MCNC: 1.2 MG/DL (ref 0.6–1.3)
GFR SERPL CREATININE-BSD FRML MDRD: 70 ML/MIN/1.73SQ M
GLUCOSE P FAST SERPL-MCNC: 104 MG/DL (ref 65–99)
GLUCOSE SERPL-MCNC: 104 MG/DL (ref 65–140)
P AXIS: 39 DEGREES
POTASSIUM SERPL-SCNC: 4 MMOL/L (ref 3.5–5.3)
PR INTERVAL: 136 MS
QRS AXIS: -58 DEGREES
QRSD INTERVAL: 138 MS
QT INTERVAL: 436 MS
QTC INTERVAL: 461 MS
SODIUM SERPL-SCNC: 138 MMOL/L (ref 135–147)
T WAVE AXIS: 97 DEGREES
VENTRICULAR RATE: 67 BPM

## 2025-02-14 PROCEDURE — 93005 ELECTROCARDIOGRAM TRACING: CPT

## 2025-02-14 PROCEDURE — 93458 L HRT ARTERY/VENTRICLE ANGIO: CPT | Performed by: INTERNAL MEDICINE

## 2025-02-14 PROCEDURE — C1894 INTRO/SHEATH, NON-LASER: HCPCS | Performed by: INTERNAL MEDICINE

## 2025-02-14 PROCEDURE — 99153 MOD SED SAME PHYS/QHP EA: CPT | Performed by: INTERNAL MEDICINE

## 2025-02-14 PROCEDURE — C1769 GUIDE WIRE: HCPCS | Performed by: INTERNAL MEDICINE

## 2025-02-14 PROCEDURE — 93010 ELECTROCARDIOGRAM REPORT: CPT

## 2025-02-14 PROCEDURE — 99152 MOD SED SAME PHYS/QHP 5/>YRS: CPT | Performed by: INTERNAL MEDICINE

## 2025-02-14 PROCEDURE — 80048 BASIC METABOLIC PNL TOTAL CA: CPT

## 2025-02-14 RX ORDER — ACETAMINOPHEN 325 MG/1
975 TABLET ORAL ONCE AS NEEDED
Status: DISCONTINUED | OUTPATIENT
Start: 2025-02-14 | End: 2025-02-14

## 2025-02-14 RX ORDER — ONDANSETRON 2 MG/ML
4 INJECTION INTRAMUSCULAR; INTRAVENOUS EVERY 6 HOURS PRN
Status: DISCONTINUED | OUTPATIENT
Start: 2025-02-14 | End: 2025-02-14

## 2025-02-14 RX ORDER — NITROGLYCERIN 20 MG/100ML
INJECTION INTRAVENOUS CODE/TRAUMA/SEDATION MEDICATION
Status: DISCONTINUED | OUTPATIENT
Start: 2025-02-14 | End: 2025-02-14 | Stop reason: HOSPADM

## 2025-02-14 RX ORDER — ONDANSETRON 2 MG/ML
4 INJECTION INTRAMUSCULAR; INTRAVENOUS ONCE AS NEEDED
Status: DISCONTINUED | OUTPATIENT
Start: 2025-02-14 | End: 2025-02-14 | Stop reason: HOSPADM

## 2025-02-14 RX ORDER — ACETAMINOPHEN 325 MG/1
650 TABLET ORAL EVERY 4 HOURS PRN
Status: DISCONTINUED | OUTPATIENT
Start: 2025-02-14 | End: 2025-02-14 | Stop reason: HOSPADM

## 2025-02-14 RX ORDER — ASPIRIN 81 MG/1
TABLET ORAL
Status: DISCONTINUED
Start: 2025-02-14 | End: 2025-02-14 | Stop reason: HOSPADM

## 2025-02-14 RX ORDER — SODIUM CHLORIDE 9 MG/ML
100 INJECTION, SOLUTION INTRAVENOUS CONTINUOUS
Status: DISCONTINUED | OUTPATIENT
Start: 2025-02-14 | End: 2025-02-14

## 2025-02-14 RX ORDER — MIDAZOLAM HYDROCHLORIDE 2 MG/2ML
INJECTION, SOLUTION INTRAMUSCULAR; INTRAVENOUS CODE/TRAUMA/SEDATION MEDICATION
Status: DISCONTINUED | OUTPATIENT
Start: 2025-02-14 | End: 2025-02-14 | Stop reason: HOSPADM

## 2025-02-14 RX ORDER — HEPARIN SODIUM 1000 [USP'U]/ML
INJECTION, SOLUTION INTRAVENOUS; SUBCUTANEOUS CODE/TRAUMA/SEDATION MEDICATION
Status: DISCONTINUED | OUTPATIENT
Start: 2025-02-14 | End: 2025-02-14 | Stop reason: HOSPADM

## 2025-02-14 RX ORDER — SODIUM CHLORIDE 9 MG/ML
50 INJECTION, SOLUTION INTRAVENOUS CONTINUOUS
Status: DISPENSED | OUTPATIENT
Start: 2025-02-14 | End: 2025-02-14

## 2025-02-14 RX ORDER — METOPROLOL TARTRATE 25 MG/1
25 TABLET, FILM COATED ORAL EVERY 12 HOURS SCHEDULED
Qty: 90 TABLET | Refills: 3 | Status: SHIPPED | OUTPATIENT
Start: 2025-02-14

## 2025-02-14 RX ORDER — FUROSEMIDE 20 MG/1
20 TABLET ORAL DAILY
Start: 2025-02-15 | End: 2025-02-14

## 2025-02-14 RX ORDER — FENTANYL CITRATE 50 UG/ML
INJECTION, SOLUTION INTRAMUSCULAR; INTRAVENOUS CODE/TRAUMA/SEDATION MEDICATION
Status: DISCONTINUED | OUTPATIENT
Start: 2025-02-14 | End: 2025-02-14 | Stop reason: HOSPADM

## 2025-02-14 RX ORDER — VERAPAMIL HYDROCHLORIDE 2.5 MG/ML
INJECTION, SOLUTION INTRAVENOUS CODE/TRAUMA/SEDATION MEDICATION
Status: DISCONTINUED | OUTPATIENT
Start: 2025-02-14 | End: 2025-02-14 | Stop reason: HOSPADM

## 2025-02-14 RX ORDER — FUROSEMIDE 10 MG/ML
INJECTION INTRAMUSCULAR; INTRAVENOUS CODE/TRAUMA/SEDATION MEDICATION
Status: DISCONTINUED | OUTPATIENT
Start: 2025-02-14 | End: 2025-02-14 | Stop reason: HOSPADM

## 2025-02-14 RX ORDER — ASPIRIN 81 MG/1
324 TABLET, CHEWABLE ORAL ONCE
Status: COMPLETED | OUTPATIENT
Start: 2025-02-14 | End: 2025-02-14

## 2025-02-14 RX ORDER — NITROGLYCERIN 0.4 MG/1
0.4 TABLET SUBLINGUAL ONCE AS NEEDED
Status: DISCONTINUED | OUTPATIENT
Start: 2025-02-14 | End: 2025-02-14 | Stop reason: HOSPADM

## 2025-02-14 RX ORDER — LIDOCAINE HYDROCHLORIDE 10 MG/ML
INJECTION, SOLUTION EPIDURAL; INFILTRATION; INTRACAUDAL; PERINEURAL CODE/TRAUMA/SEDATION MEDICATION
Status: DISCONTINUED | OUTPATIENT
Start: 2025-02-14 | End: 2025-02-14 | Stop reason: HOSPADM

## 2025-02-14 RX ADMIN — ASPIRIN 324 MG: 81 TABLET, CHEWABLE ORAL at 07:32

## 2025-02-14 RX ADMIN — SODIUM CHLORIDE 100 ML/HR: 0.9 INJECTION, SOLUTION INTRAVENOUS at 07:45

## 2025-02-14 NOTE — DISCHARGE INSTR - AVS FIRST PAGE
1. Please see the post cardiac catheterization dishcarge instructions.   No heavy lifting, greater than 10 lbs. or strenuous  activity for 48 hrs.    2.Remove band aid tomorrow.  Shower and wash area- wrist gently with soap and water- beginning tomorrow. Rinse and pat dry.  Apply new water seal band aid.  Repeat this process for 5 days. No powders, creams lotions or antibiotic ointments  for 5 days.  No tub baths, hot tubs or swimming for 5 days.     3. Please call our office ([phone]) if you have any fever, redness, swelling, discharge from your wrist access site.    4.No driving for 1 day

## 2025-02-14 NOTE — INTERVAL H&P NOTE
/67   Pulse 79   Temp 97.7 °F (36.5 °C) (Temporal)   Resp 15   Wt 94.5 kg (208 lb 5.4 oz)   SpO2 97%   BMI 35.76 kg/m²       H&P reviewed. After examining the patient, I find no changed to the H&P since it had been written.    Patient re-evaluated. Accept as history and physical.    Kingsley Zamora MD/February 14, 2025/9:39 AM

## 2025-02-19 ENCOUNTER — TELEPHONE (OUTPATIENT)
Age: 50
End: 2025-02-19

## 2025-02-19 NOTE — TELEPHONE ENCOUNTER
Caller: Cricket Reyes     Doctor: Dr. Graff     Reason for call: Patient was scheduled for a hospital follow up for tomorrow 2/20/25 that was rescheduled due to the provider being on vacation. Patient is asking if he is able to wait until his appointment that is scheduled with Dr. Graff on 3/11/25. Please call the patient to reschedule if necessary.     Call back#: [phone]

## 2025-03-11 ENCOUNTER — OFFICE VISIT (OUTPATIENT)
Dept: CARDIOLOGY CLINIC | Facility: CLINIC | Age: 50
End: 2025-03-11
Payer: COMMERCIAL

## 2025-03-11 VITALS
HEART RATE: 70 BPM | WEIGHT: 204.8 LBS | DIASTOLIC BLOOD PRESSURE: 72 MMHG | SYSTOLIC BLOOD PRESSURE: 124 MMHG | BODY MASS INDEX: 34.97 KG/M2 | OXYGEN SATURATION: 99 % | HEIGHT: 64 IN

## 2025-03-11 DIAGNOSIS — I50.22 CHRONIC HFREF (HEART FAILURE WITH REDUCED EJECTION FRACTION) (HCC): Primary | ICD-10-CM

## 2025-03-11 DIAGNOSIS — I48.92 PAROXYSMAL ATRIAL FLUTTER (HCC): ICD-10-CM

## 2025-03-11 DIAGNOSIS — I08.0 MITRAL AND AORTIC VALVE DISEASE: ICD-10-CM

## 2025-03-11 DIAGNOSIS — I77.810 DILATED AORTIC ROOT (HCC): ICD-10-CM

## 2025-03-11 DIAGNOSIS — I51.7 BIATRIAL ENLARGEMENT: ICD-10-CM

## 2025-03-11 DIAGNOSIS — I42.0 DILATED CARDIOMYOPATHY (HCC): ICD-10-CM

## 2025-03-11 PROCEDURE — 99214 OFFICE O/P EST MOD 30 MIN: CPT | Performed by: INTERNAL MEDICINE

## 2025-03-11 RX ORDER — METOPROLOL SUCCINATE 25 MG/1
25 TABLET, EXTENDED RELEASE ORAL 2 TIMES DAILY
Qty: 180 TABLET | Refills: 3 | Status: SHIPPED | OUTPATIENT
Start: 2025-03-11

## 2025-03-11 RX ORDER — LOSARTAN POTASSIUM 25 MG/1
25 TABLET ORAL DAILY
Qty: 90 TABLET | Refills: 3 | Status: SHIPPED | OUTPATIENT
Start: 2025-03-11

## 2025-03-11 NOTE — ASSESSMENT & PLAN NOTE
Wt Readings from Last 3 Encounters:   03/11/25 92.9 kg (204 lb 12.8 oz)   02/14/25 94.5 kg (208 lb 5.4 oz)   01/31/25 94.3 kg (208 lb)     Has nonischemic cardiomyopathy as suggested by cardiac catheterization.  Has AHA class C heart failure with NYHA class I clinical status.    Appears to be well compensated on examination without evidence of pulmonary or peripheral vascular congestion.  Renal function and electrolytes are stable.    Today we had detailed discussion about the diagnosis of nonischemic cardiomyopathy and heart failure with reduced ejection fraction.  We also discussed about possible etiologies of this condition.  Only etiology we can think of at this time is alcohol use.  We also discussed possible idiopathic cardiomyopathy without identifiable cause.  We talked about heart failure and risk for ventricular arrhythmias and risk mitigation with ICD device.      We also talked about guideline based medical therapy with medications including beta-blockers + ACE inhibitor/ARB's/Arni + aldosterone antagonist + nitrates and hydralazine +/-diuretic for symptom relief and other medications.  We advised that we would like to gradually place him on these medications to maximize benefit of medical therapy for heart failure.    We are doing the following:  Metoprolol tartrate is being discontinued.  He is starting metoprolol succinate at 25 mg twice daily.  He is being started on losartan 25 mg once daily.  I am advising him to start taking half a tablet for first 3 4 days and then take 1 full tablet daily.  I am requesting a follow-up basic metabolic panel and a BNP level to be checked in about 2 weeks after initiation of losartan.  We will arrange for him to be seen by nurse practitioner in about 6 weeks time.  At that visit we will consider adding spironolactone.  If he develops any heart failure symptoms then we will switch losartan to Entresto.  We will plan to have a limited echocardiogram in 3 months time  to reassess LV function.  We are requesting for a 2-week ZIO XT monitor to assess for any ectopy or recurrence of atrial flutter.    We discussed about heart failure exacerbation symptoms.  Some tips are provided below.    The following routine measures are being advised to prevent exacerbation of congestive heart failure:     - Daily or atleast weekly checking of weight.    Notify your clinicians if greater than 2 lb is gained in 1 day or greater than 5 lb is gained in 1 wk.    - Checking for lower extremity swelling.    Examine legs for swelling (new or increase in existing swelling) and describe if swelling reaches ankle, shin, or knee.    - Monitoring for decrease in exercise tolerance.    Check your self for unusual shortness of breath at rest, or with mild exertion, moderate exertion, or none at all.    - Monitoring for worsening shortness of breath on lying down.    Check for increase in number of pillows used at night and for increase in waking at night with shortness of breath.    - Salt/sodium restriction to less than 2 g a day.    Calculate total sodium intake in a day from nutrition labels and food charts. Understand hidden sources of salt intake.  Eliminate the salt shaker. (Remember, One teaspoon of table salt = 2,300 mg of sodium)   Avoid using garlic salt, onion salt, MSG, meat tenderizers, broth mixes, Chinese food, soy sauce, teriyaki sauce, barbeque sauce, sauerkraut, olives, pickles, pickle relish, viera bits, and croutons.   Use fresh ingredients and/or foods with no added salt.  Try orange, lemon, lime, pineapple juice, or vinegar as a base for meat marinades or to add tart flavor.  Avoid convenience foods such as canned soups, entrees, vegetables, pasta and rice mixes, frozen dinners, instant cereal and puddings, and gravy sauce mixes.   Select frozen meals that contain around 600 mg sodium or less.  Use fresh, frozen, no-added-salt canned vegetables, low-sodium soups, and low-sodium  lunchmeats.  Look for seasoning or spice blends with no salt, or try fresh herbs, onions, or garlic.    You are advised to inform us for any concerns regarding above measures.          Orders:    losartan (COZAAR) 25 mg tablet; Take 1 tablet (25 mg total) by mouth daily    metoprolol succinate (TOPROL-XL) 25 mg 24 hr tablet; Take 1 tablet (25 mg total) by mouth 2 (two) times a day    Zio Monitor; Future    Echo follow up/limited w/ contrast if indicated; Future    Basic metabolic panel; Future    B-Type Natriuretic Peptide(BNP); Future

## 2025-03-11 NOTE — PATIENT INSTRUCTIONS
Assessment & Plan  Chronic HFrEF (heart failure with reduced ejection fraction) ([facility])  Wt Readings from Last 3 Encounters:   03/11/25 92.9 kg (204 lb 12.8 oz)   02/14/25 94.5 kg (208 lb 5.4 oz)   01/31/25 94.3 kg (208 lb)     Has nonischemic cardiomyopathy as suggested by cardiac catheterization.  Has AHA class C heart failure with NYHA class I clinical status.    Appears to be well compensated on examination without evidence of pulmonary or peripheral vascular congestion.  Renal function and electrolytes are stable.    Today we had detailed discussion about the diagnosis of nonischemic cardiomyopathy and heart failure with reduced ejection fraction.  We also discussed about possible etiologies of this condition.  Only etiology we can think of at this time is alcohol use.  We also discussed possible idiopathic cardiomyopathy without identifiable cause.  We talked about heart failure and risk for ventricular arrhythmias and risk mitigation with ICD device.      We also talked about guideline based medical therapy with medications including beta-blockers + ACE inhibitor/ARB's/Arni + aldosterone antagonist + nitrates and hydralazine +/-diuretic for symptom relief and other medications.  We advised that we would like to gradually place him on these medications to maximize benefit of medical therapy for heart failure.    We are doing the following:  Metoprolol tartrate is being discontinued.  He is starting metoprolol succinate at 25 mg twice daily.  He is being started on losartan 25 mg once daily.  I am advising him to start taking half a tablet for first 3 4 days and then take 1 full tablet daily.  I am requesting a follow-up basic metabolic panel and a BNP level to be checked in about 2 weeks after initiation of losartan.  We will arrange for him to be seen by nurse practitioner in about 6 weeks time.  At that visit we will consider adding spironolactone.  If he develops any heart failure symptoms then we will  switch losartan to Entresto.  We will plan to have a limited echocardiogram in 3 months time to reassess LV function.  We are requesting for a 2-week ZIO XT monitor to assess for any ectopy or recurrence of atrial flutter.    We discussed about heart failure exacerbation symptoms.  Some tips are provided below.    The following routine measures are being advised to prevent exacerbation of congestive heart failure:     - Daily or atleast weekly checking of weight.    Notify your clinicians if greater than 2 lb is gained in 1 day or greater than 5 lb is gained in 1 wk.    - Checking for lower extremity swelling.    Examine legs for swelling (new or increase in existing swelling) and describe if swelling reaches ankle, shin, or knee.    - Monitoring for decrease in exercise tolerance.    Check your self for unusual shortness of breath at rest, or with mild exertion, moderate exertion, or none at all.    - Monitoring for worsening shortness of breath on lying down.    Check for increase in number of pillows used at night and for increase in waking at night with shortness of breath.    - Salt/sodium restriction to less than 2 g a day.    Calculate total sodium intake in a day from nutrition labels and food charts. Understand hidden sources of salt intake.  Eliminate the salt shaker. (Remember, One teaspoon of table salt = 2,300 mg of sodium)   Avoid using garlic salt, onion salt, MSG, meat tenderizers, broth mixes, Chinese food, soy sauce, teriyaki sauce, barbeque sauce, sauerkraut, olives, pickles, pickle relish, viera bits, and croutons.   Use fresh ingredients and/or foods with no added salt.  Try orange, lemon, lime, pineapple juice, or vinegar as a base for meat marinades or to add tart flavor.  Avoid convenience foods such as canned soups, entrees, vegetables, pasta and rice mixes, frozen dinners, instant cereal and puddings, and gravy sauce mixes.   Select frozen meals that contain around 600 mg sodium or  less.  Use fresh, frozen, no-added-salt canned vegetables, low-sodium soups, and low-sodium lunchmeats.  Look for seasoning or spice blends with no salt, or try fresh herbs, onions, or garlic.    You are advised to inform us for any concerns regarding above measures.          Orders:    losartan (COZAAR) 25 mg tablet; Take 1 tablet (25 mg total) by mouth daily    metoprolol succinate (TOPROL-XL) 25 mg 24 hr tablet; Take 1 tablet (25 mg total) by mouth 2 (two) times a day    Zio Monitor; Future    Dilated cardiomyopathy (HCC)  Dilated nonischemic cardiomyopathy.  Evaluation as noted above.  Orders:    losartan (COZAAR) 25 mg tablet; Take 1 tablet (25 mg total) by mouth daily    Zio Monitor; Future    Paroxysmal atrial flutter (HCC)  History of atrial flutter in 2024.  Has had no recurrence.  He is on aspirin therapy.  He is not on any anticoagulation.       Mitral and aortic valve disease  Mild mitral and aortic valve regurgitation noted on echocardiogram previously.  There has been no recent heart failure symptoms.  Evaluation and plan as noted above.       Dilated aortic root (HCC)  Dilated aortic root on echocardiogram last year.  Patient was supposed to have CT for screening for aorta involvement but this could not be done previously.  He will reorder this again.  Orders:    CT chest without contrast; Future    Biatrial enlargement  Has history of severe biatrial enlargement by echocardiogram.  Has had no recent heart failure symptoms.

## 2025-03-11 NOTE — ASSESSMENT & PLAN NOTE
Dilated nonischemic cardiomyopathy.  Evaluation as noted above.  Orders:    losartan (COZAAR) 25 mg tablet; Take 1 tablet (25 mg total) by mouth daily    Zio Monitor; Future    Echo follow up/limited w/ contrast if indicated; Future    Basic metabolic panel; Future    B-Type Natriuretic Peptide(BNP); Future

## 2025-03-11 NOTE — ASSESSMENT & PLAN NOTE
Dilated aortic root on echocardiogram last year.  Patient was supposed to have CT for screening for aorta involvement but this could not be done previously.  He will reorder this again.  Orders:    CT chest without contrast; Future

## 2025-03-11 NOTE — PROGRESS NOTES
Name: Cricket Reyes      : 1975      MRN: [id number]  Encounter Provider: Raymundo Graff MD  Encounter Date: 3/11/2025   Encounter department: [facility] CARDIOLOGY [place]    DIAGNOSES:  1. Paroxysmal atrial flutter (2024 In ED with SOB and was in Atrial flutter with 2:1 block. )  2.  Dilated nonischemic cardiomyopathy with chronic heart failure with reduced ejection fraction  3. Moderate to severe left atrial enlargement  4. Nonspecific mitral and aortic valve disease  5.  History of childhood asthma, now resolved  6. History of cholecystectomy 2024 (had acute cholecystitis 2024), history of hernia repair, history of ACL repair as a child.    TRANSTHORACIC ECHO 24; LVEF 32%, LVIDD 7.1 cm with global hypokinesis, grade II DD, moderate to severe LAE, aortic valve sclerosis, mitral leaflet sclerosis, ascending aortic root 4.2 cm    TRANSTHORACIC ECHO 24:  Dilated left ventricular cavity LVIDD 6.9 cm, mildly increased wall thickness, mild concentric left ventricular hypertrophy, moderately reduced left ventricular systolic function with global hypokinesis.  EF 40%.  Grade 2 diastolic dysfunction.  Normal right ventricular size and systolic function.  Mild biatrial enlargement.  Normal aortic valve, mild aortic valve regurgitation.  Normal mitral valve, moderate mitral valve regurgitation.  No tricuspid or pulmonic valve regurgitation.  Moderately dilated aortic root.  Ascending aorta was not commented upon.  Normal Favini given  No pericardial effusion      EXERCISE NUCLEAR STRESS TEST 2025:  ·  A Sang protocol stress test was performed. Overall, the patient's exercise capacity was normal for their age. The patient reached stage 3.0of the protocol after exercising for 7 min and 30 sec and had a maximal HR of 155 bpm (91% of MPHR) and 10.1 METS. The patient experienced no chest pain during the test.  ·  Stress ECG: No diagnostic ST deviation is noted compared to baseline.  Arrhythmias during stress: occasional PVCs. Arrhythmias during recovery: occasional PVCs. The ECG was not diagnostic due to resting ST-T abnormalities. The stress ECG is non-diagnostic for ischemia after maximal exercise, without reproduction of symptoms.  ·  Stress Function: Left ventricular function post-stress is abnormal. Global function is moderate to severely reduced. Stress ejection fraction is 25%.  ·  Perfusion Defect Conclusion: The rest end diastolic cavity size is severely enlarged. The stress end diastolic cavity size is severely dilated.  ·  Perfusion: There is a left ventricular perfusion defect that is medium in size with moderate reduction in uptake present in the entire inferior location(s) that is predominantly fixed. The defect appears to be an artifact caused by subdiaphragmatic activity. There is a left ventricular perfusion defect that is small in size with mild reduction in uptake present in the apical to mid anterior location(s) that is reversible.  ·  Stress Combined Conclusion: Left ventricular perfusion is abnormal. Findings are consistent with ischemia.    CARDIAC CATHETERIZATION 2/14/2025:  Left main: Large, angiographically normal.  LAD: Large, angiographically normal.  LCx: Large, angiographically normal.  RCA: Large angiographically normal.  Distal myocardial bridging present.  LVEDP was mildly increased.  There was no aortic valve disease by cath.    Negative screening test for thyroid dysfunction, negative REMBERTO screen.  Referral to Cardiology by Nestor Davila DO for atrial flutter, typical.    Assessment & Plan  Chronic HFrEF (heart failure with reduced ejection fraction) ([facility])  Wt Readings from Last 3 Encounters:   03/11/25 92.9 kg (204 lb 12.8 oz)   02/14/25 94.5 kg (208 lb 5.4 oz)   01/31/25 94.3 kg (208 lb)     Has nonischemic cardiomyopathy as suggested by cardiac catheterization.  Has AHA class C heart failure with NYHA class I clinical status.    Appears to be well  compensated on examination without evidence of pulmonary or peripheral vascular congestion.  Renal function and electrolytes are stable.    Today we had detailed discussion about the diagnosis of nonischemic cardiomyopathy and heart failure with reduced ejection fraction.  We also discussed about possible etiologies of this condition.  Only etiology we can think of at this time is alcohol use.  We also discussed possible idiopathic cardiomyopathy without identifiable cause.  We talked about heart failure and risk for ventricular arrhythmias and risk mitigation with ICD device.      We also talked about guideline based medical therapy with medications including beta-blockers + ACE inhibitor/ARB's/Arni + aldosterone antagonist + nitrates and hydralazine +/-diuretic for symptom relief and other medications.  We advised that we would like to gradually place him on these medications to maximize benefit of medical therapy for heart failure.    We are doing the following:  Metoprolol tartrate is being discontinued.  He is starting metoprolol succinate at 25 mg twice daily.  He is being started on losartan 25 mg once daily.  I am advising him to start taking half a tablet for first 3 4 days and then take 1 full tablet daily.  I am requesting a follow-up basic metabolic panel and a BNP level to be checked in about 2 weeks after initiation of losartan.  We will arrange for him to be seen by nurse practitioner in about 6 weeks time.  At that visit we will consider adding spironolactone.  If he develops any heart failure symptoms then we will switch losartan to Entresto.  We will plan to have a limited echocardiogram in 3 months time to reassess LV function.  We are requesting for a 2-week ZIO XT monitor to assess for any ectopy or recurrence of atrial flutter.    We discussed about heart failure exacerbation symptoms.  Some tips are provided below.    The following routine measures are being advised to prevent exacerbation of  congestive heart failure:     - Daily or atleast weekly checking of weight.    Notify your clinicians if greater than 2 lb is gained in 1 day or greater than 5 lb is gained in 1 wk.    - Checking for lower extremity swelling.    Examine legs for swelling (new or increase in existing swelling) and describe if swelling reaches ankle, shin, or knee.    - Monitoring for decrease in exercise tolerance.    Check your self for unusual shortness of breath at rest, or with mild exertion, moderate exertion, or none at all.    - Monitoring for worsening shortness of breath on lying down.    Check for increase in number of pillows used at night and for increase in waking at night with shortness of breath.    - Salt/sodium restriction to less than 2 g a day.    Calculate total sodium intake in a day from nutrition labels and food charts. Understand hidden sources of salt intake.  Eliminate the salt shaker. (Remember, One teaspoon of table salt = 2,300 mg of sodium)   Avoid using garlic salt, onion salt, MSG, meat tenderizers, broth mixes, Chinese food, soy sauce, teriyaki sauce, barbeque sauce, sauerkraut, olives, pickles, pickle relish, viera bits, and croutons.   Use fresh ingredients and/or foods with no added salt.  Try orange, lemon, lime, pineapple juice, or vinegar as a base for meat marinades or to add tart flavor.  Avoid convenience foods such as canned soups, entrees, vegetables, pasta and rice mixes, frozen dinners, instant cereal and puddings, and gravy sauce mixes.   Select frozen meals that contain around 600 mg sodium or less.  Use fresh, frozen, no-added-salt canned vegetables, low-sodium soups, and low-sodium lunchmeats.  Look for seasoning or spice blends with no salt, or try fresh herbs, onions, or garlic.    You are advised to inform us for any concerns regarding above measures.          Orders:    losartan (COZAAR) 25 mg tablet; Take 1 tablet (25 mg total) by mouth daily    metoprolol succinate (TOPROL-XL)  25 mg 24 hr tablet; Take 1 tablet (25 mg total) by mouth 2 (two) times a day    Zio Monitor; Future    Echo follow up/limited w/ contrast if indicated; Future    Basic metabolic panel; Future    B-Type Natriuretic Peptide(BNP); Future    Dilated cardiomyopathy (HCC)  Dilated nonischemic cardiomyopathy.  Evaluation as noted above.  Orders:    losartan (COZAAR) 25 mg tablet; Take 1 tablet (25 mg total) by mouth daily    Zio Monitor; Future    Echo follow up/limited w/ contrast if indicated; Future    Basic metabolic panel; Future    B-Type Natriuretic Peptide(BNP); Future    Paroxysmal atrial flutter (HCC)  History of atrial flutter in 2024.  Has had no recurrence.  He is on aspirin therapy.  He is not on any anticoagulation.       Mitral and aortic valve disease  Mild mitral and aortic valve regurgitation noted on echocardiogram previously.  There has been no recent heart failure symptoms.  Evaluation and plan as noted above.       Dilated aortic root (HCC)  Dilated aortic root on echocardiogram last year.  Patient was supposed to have CT for screening for aorta involvement but this could not be done previously.  He will reorder this again.  Orders:    CT chest without contrast; Future    Biatrial enlargement  Has history of severe biatrial enlargement by echocardiogram.  Has had no recent heart failure symptoms.           History of Present Illness   HPI  Cricket Reyes is a 49 y.o. male who presents regarding follow-up of his history of paroxysmal atrial flutter, dilated cardiomyopathy with chronic heart failure with reduced ejection fraction, dilated aortic root and mitral and aortic valve disease.  Was last seen in cardiology office on 1/23/2025.  Had an abnormal nuclear stress test earlier and was sent to undergo cardiac catheterization.    History obtained from: patient    He mentions that since last visit he has been overall doing well.  He reports that his previously reported palpitations have more or less  resolved.  He denies any unusual chest pain or shortness of breath with normal activities or palpitations or passing out or near passing out or swelling in the feet.  He denies any orthopnea or PND.  He mentions that since starting the metoprolol he feels better with respect to his palpitations.  He has returned back to gym.  He does get short of breath when he exerts himself but not with normal activities.  He remains active and continues to work as a .    Functional capacity status: Good   (Excellent- >10 METs; Good: (7-10 METs); Moderate (4-7 METs); Poor (<= 4 METs)    Any chronic stressors: None   (feeling of poor health, financial problems, and social isolation etc).    Tobacco or alcohol dependence: He has never been a smoker.  Reports that he drinks about 2-4 alcohol drinks usually rum in a week.  Denies any marijuana or any other drug use.    Current cardiac medications: Aspirin 81 mg daily + atorvastatin 40 mg daily + empagliflozin 10 mg daily + metoprolol tartrate 25 mg twice daily    Last recent comprehensive blood work available:   Blood work 2/5/2025 sodium 143 potassium 4.7 chloride 107 bicarb 27 BUN 19 creatinine 1.30 GFR 63  TSH 3.035 1/27/2025  Negative REMBERTO screen January 2025  Normal LFTs 2/10/2025   on 1/27/2025  Lipid profile 2/10/2025  HDL 42  Calc LDL 93  No iron studies    The 10-year ASCVD risk score (Milind VARGAS, et al., 2019) is: 2.9%    Values used to calculate the score:      Age: 49 years      Sex: Male      Is Non- : No      Diabetic: No      Tobacco smoker: No      Systolic Blood Pressure: 124 mmHg      Is BP treated: No      HDL Cholesterol: 42 mg/dL      Total Cholesterol: 159 mg/dL  (Low risk: Less than <5%; borderline risk: >=5% to <7.5%; intermediate risk: >=7.5% to <20%; high risk:>=20%)  Patient's ASCVD risk category: Low risk    Major cardiac risk factors: No family history of premature CAD or sudden cardiac death.   There is history related to drug use tests.  (Tobacco use, Hypertension, Family history of CHD, Primary severe hypercholesterolemia, DM, multiple major and risk enhancing factors)     Any cardiac clinical risk enhancers from available data: -- (Family history of premature CAD, patient's history of chronic kidney disease, primary hypercholesterolemia, metabolic syndrome, abnormal EZEQUIEL, inflammatory condition such as RA-HIV-psoriasis, RA-HIV-Psoriasis,, pregnancy related complications such as preeclampsia or premature delivery, early menopause, high risk ethnicity such as [place] , social deprivation, physical inactivity, nonalcoholic fatty liver disease psychosocial stress, major psychiatric illness, atrial fibrillation, left ventricular hypertrophy, obstructive sleep apnea, nonalcoholic fatty liver disease).    ECG: No results found for this visit on 03/11/25.    REVIEW OF SYSTEMS   Positive for: As noted above in HPI  Negative for: All remaining as reviewed below and in HPI.    SYSTEM SYMPTOMS REVIEWED:  General--weight change, fever, night sweats  Respiratory--cough, wheezing, shortness of breath, sputum production  Cardiovascular--chest pain, syncope, dyspnea on exertion, edema, decline in exercise tolerance, claudication   Gastrointestinal--persistent vomiting, diarrhea, abdominal distention, blood in stool   Muscular or skeletal--joint pain or swelling   Neurologic--headaches, syncope, abnormal movement  Hematologic--history of easy bruising and bleeding   Endocrine--thyroid enlargement, heat or cold intolerance, polyuria   Psychiatric--anxiety, depression     CURRENT MEDICATIONS LIST     Current Outpatient Medications:     aspirin 81 mg chewable tablet, Chew 1 tablet (81 mg total) daily, Disp: 90 tablet, Rfl: 0    atorvastatin (LIPITOR) 40 mg tablet, Take 1 tablet (40 mg total) by mouth daily, Disp: 90 tablet, Rfl: 0    Empagliflozin (Jardiance) 10 MG TABS tablet, Take 1 tablet (10 mg total) by mouth  "every morning, Disp: 30 tablet, Rfl: 10    losartan (COZAAR) 25 mg tablet, Take 1 tablet (25 mg total) by mouth daily, Disp: 90 tablet, Rfl: 3    metoprolol succinate (TOPROL-XL) 25 mg 24 hr tablet, Take 1 tablet (25 mg total) by mouth 2 (two) times a day, Disp: 180 tablet, Rfl: 3       ALLERGIES     Allergies   Allergen Reactions    Xarelto [Rivaroxaban] Rash       *-*-*-*-*-*-*-*-*-*-*-*-*-*-*-*-*-*-*-*-*-*-*-*-*-*-*-*-*-*-*-*-*-*-*-*-*-*-*-*-*-*-*-*-*-*-*-*-*-*-*-*-*-*-      Review of Systems   Constitutional: Negative.    HENT: Negative.     Respiratory: Negative.     Cardiovascular: Negative.    Gastrointestinal: Negative.    Musculoskeletal: Negative.    Skin: Negative.    Neurological: Negative.    Psychiatric/Behavioral: Negative.            Objective   /72   Pulse 70   Ht 5' 4\" (1.626 m)   Wt 92.9 kg (204 lb 12.8 oz)   SpO2 99%   BMI 35.15 kg/m²      Physical Exam  Constitutional:       Appearance: He is well-developed.   HENT:      Mouth/Throat:      Mouth: Mucous membranes are moist.   Cardiovascular:      Rate and Rhythm: Regular rhythm.      Heart sounds: Normal heart sounds. No murmur heard.  Pulmonary:      Effort: Pulmonary effort is normal.      Breath sounds: Normal breath sounds.   Abdominal:      Palpations: Abdomen is soft.   Musculoskeletal:      Cervical back: Neck supple.      Right lower leg: No edema.      Left lower leg: No edema.   Skin:     General: Skin is dry.   Neurological:      Mental Status: He is alert and oriented to person, place, and time. Mental status is at baseline.   Psychiatric:         Mood and Affect: Mood normal.         Behavior: Behavior normal.           "

## 2025-03-11 NOTE — ASSESSMENT & PLAN NOTE
History of atrial flutter in 2024.  Has had no recurrence.  He is on aspirin therapy.  He is not on any anticoagulation.

## 2025-03-11 NOTE — ASSESSMENT & PLAN NOTE
Mild mitral and aortic valve regurgitation noted on echocardiogram previously.  There has been no recent heart failure symptoms.  Evaluation and plan as noted above.

## 2025-03-11 NOTE — ASSESSMENT & PLAN NOTE
Has history of severe biatrial enlargement by echocardiogram.  Has had no recent heart failure symptoms.

## 2025-03-18 ENCOUNTER — TELEPHONE (OUTPATIENT)
Age: 50
End: 2025-03-18

## 2025-03-18 NOTE — TELEPHONE ENCOUNTER
Caller: Navdeep from [facility]    Doctor/Office: Dr Graff     CB#: [phone]      What needs to be faxed: CT chest order     ATTN to:     Fax#: [phone]

## 2025-03-19 ENCOUNTER — TELEPHONE (OUTPATIENT)
Age: 50
End: 2025-03-19

## 2025-03-19 NOTE — TELEPHONE ENCOUNTER
Patient called to reschedule his follow up appointment. Appointment was rescheduled and patient asked about lab orders. Patient states he was advised at last appointment that he needed to have labs drawn prior to upcoming appointment.    Per last OVN, the following orders should eb in the system:    Comprehensive metabolic panel; Future    PSA, Total Screen; Future    Lipid Panel with Direct LDL reflex; Future    Please place orders and advise patient when they are placed so he can have labs drawn.

## 2025-03-19 NOTE — TELEPHONE ENCOUNTER
Patient given advised that no blood test is required prior to appointment since he had test done 2/2025

## 2025-03-24 ENCOUNTER — APPOINTMENT (OUTPATIENT)
Dept: LAB | Facility: MEDICAL CENTER | Age: 50
End: 2025-03-24
Payer: COMMERCIAL

## 2025-03-24 DIAGNOSIS — I50.22 CHRONIC HFREF (HEART FAILURE WITH REDUCED EJECTION FRACTION) (HCC): ICD-10-CM

## 2025-03-24 DIAGNOSIS — I42.0 DILATED CARDIOMYOPATHY (HCC): ICD-10-CM

## 2025-03-24 LAB
ANION GAP SERPL CALCULATED.3IONS-SCNC: 7 MMOL/L (ref 4–13)
BNP SERPL-MCNC: 323 PG/ML (ref 0–100)
BUN SERPL-MCNC: 17 MG/DL (ref 5–25)
CALCIUM SERPL-MCNC: 9.3 MG/DL (ref 8.4–10.2)
CHLORIDE SERPL-SCNC: 106 MMOL/L (ref 96–108)
CO2 SERPL-SCNC: 26 MMOL/L (ref 21–32)
CREAT SERPL-MCNC: 1.18 MG/DL (ref 0.6–1.3)
GFR SERPL CREATININE-BSD FRML MDRD: 71 ML/MIN/1.73SQ M
GLUCOSE P FAST SERPL-MCNC: 102 MG/DL (ref 65–99)
POTASSIUM SERPL-SCNC: 4.8 MMOL/L (ref 3.5–5.3)
SODIUM SERPL-SCNC: 139 MMOL/L (ref 135–147)

## 2025-03-24 PROCEDURE — 80048 BASIC METABOLIC PNL TOTAL CA: CPT

## 2025-03-24 PROCEDURE — 83880 ASSAY OF NATRIURETIC PEPTIDE: CPT

## 2025-03-24 PROCEDURE — 36415 COLL VENOUS BLD VENIPUNCTURE: CPT

## 2025-03-26 ENCOUNTER — TELEPHONE (OUTPATIENT)
Age: 50
End: 2025-03-26

## 2025-03-26 NOTE — TELEPHONE ENCOUNTER
Received call from Del with [facility] regaridng a CT scan ordered by Dr. Graff. She is requesting a call from our prior auth team as the authorization for the CT scan was not made out to them. Phone number: [phone].

## 2025-03-27 NOTE — TELEPHONE ENCOUNTER
Approved [place] [address]- Patient scheduled for 4/3/25  Auth# [id number]  03/17/25-09/23/25    Called [facility] with approved authorization.

## 2025-03-27 NOTE — TELEPHONE ENCOUNTER
Returned Del call @ [phone] and left a VM for a call back in regards to authorization for CT. Gave her my number of [phone].

## 2025-04-02 ENCOUNTER — OFFICE VISIT (OUTPATIENT)
Dept: FAMILY MEDICINE CLINIC | Facility: CLINIC | Age: 50
End: 2025-04-02
Payer: COMMERCIAL

## 2025-04-02 VITALS
SYSTOLIC BLOOD PRESSURE: 119 MMHG | HEIGHT: 64 IN | HEART RATE: 65 BPM | WEIGHT: 210.2 LBS | BODY MASS INDEX: 35.89 KG/M2 | OXYGEN SATURATION: 99 % | DIASTOLIC BLOOD PRESSURE: 61 MMHG | TEMPERATURE: 97.7 F

## 2025-04-02 DIAGNOSIS — G89.29 CHRONIC PAIN OF TOE OF LEFT FOOT: Primary | ICD-10-CM

## 2025-04-02 DIAGNOSIS — I50.22 CHRONIC HFREF (HEART FAILURE WITH REDUCED EJECTION FRACTION) (HCC): ICD-10-CM

## 2025-04-02 DIAGNOSIS — M79.675 CHRONIC PAIN OF TOE OF LEFT FOOT: Primary | ICD-10-CM

## 2025-04-02 PROCEDURE — 99213 OFFICE O/P EST LOW 20 MIN: CPT | Performed by: FAMILY MEDICINE

## 2025-04-02 RX ORDER — DIPHENOXYLATE HYDROCHLORIDE AND ATROPINE SULFATE 2.5; .025 MG/1; MG/1
1 TABLET ORAL DAILY
COMMUNITY

## 2025-04-02 NOTE — ASSESSMENT & PLAN NOTE
Wt Readings from Last 3 Encounters:   04/02/25 95.3 kg (210 lb 3.2 oz)   03/11/25 92.9 kg (204 lb 12.8 oz)   02/14/25 94.5 kg (208 lb 5.4 oz)

## 2025-04-02 NOTE — PROGRESS NOTES
"Name: Cricket Reyes      : 1975      MRN: [id number]  Encounter Provider: Tushar Mahoney DO  Encounter Date: 2025   [facility]  :  Chief Complaint   Patient presents with    Follow-up    Hypertension    Foot Pain     Talk about podiatry      BMI Counseling: Body mass index is 36.08 kg/m². The BMI is above normal. Nutrition recommendations include reducing portion sizes, consuming healthier snacks, and moderation in carbohydrate intake.  Assessment & Plan  Chronic pain of toe of left foot    Orders:    Ambulatory Referral to Podiatry; Future    Chronic HFrEF (heart failure with reduced ejection fraction) ([facility])  Wt Readings from Last 3 Encounters:   25 95.3 kg (210 lb 3.2 oz)   25 92.9 kg (204 lb 12.8 oz)   25 94.5 kg (208 lb 5.4 oz)                           History of Present Illness   Review labs.  Left foot 4 th toe hurts.  Reviewed toe XR.      Review of Systems   Constitutional: Negative.    HENT: Negative.     Eyes: Negative.    Respiratory: Negative.     Cardiovascular: Negative.    Gastrointestinal: Negative.    Genitourinary: Negative.    Musculoskeletal:         Toe pain   Skin: Negative.    Neurological: Negative.    Psychiatric/Behavioral: Negative.         Objective   /61 (BP Location: Left arm, Patient Position: Sitting, Cuff Size: Standard)   Pulse 65   Temp 97.7 °F (36.5 °C) (Temporal)   Ht 5' 4\" (1.626 m)   Wt 95.3 kg (210 lb 3.2 oz)   SpO2 99%   BMI 36.08 kg/m²      Physical Exam  Constitutional:       Appearance: He is well-developed.   HENT:      Head: Normocephalic and atraumatic.      Right Ear: Tympanic membrane, ear canal and external ear normal.      Left Ear: Tympanic membrane, ear canal and external ear normal.      Nose: Nose normal.      Mouth/Throat:      Mouth: Mucous membranes are moist.      Pharynx: Oropharynx is clear.   Eyes:      Conjunctiva/sclera: Conjunctivae normal.      Pupils: Pupils are equal, " round, and reactive to light.   Cardiovascular:      Rate and Rhythm: Normal rate and regular rhythm.      Heart sounds: Normal heart sounds.   Pulmonary:      Effort: Pulmonary effort is normal.      Breath sounds: Normal breath sounds.   Abdominal:      General: Abdomen is flat. Bowel sounds are normal.      Palpations: Abdomen is soft.   Musculoskeletal:      Cervical back: Normal range of motion and neck supple.   Skin:     General: Skin is warm and dry.   Neurological:      Mental Status: He is alert and oriented to person, place, and time.      Deep Tendon Reflexes: Reflexes are normal and symmetric.   Psychiatric:         Mood and Affect: Mood normal.         Behavior: Behavior normal.         Thought Content: Thought content normal.         Judgment: Judgment normal.

## 2025-04-17 ENCOUNTER — OFFICE VISIT (OUTPATIENT)
Dept: FAMILY MEDICINE CLINIC | Facility: CLINIC | Age: 50
End: 2025-04-17
Payer: COMMERCIAL

## 2025-04-17 VITALS
HEART RATE: 81 BPM | DIASTOLIC BLOOD PRESSURE: 60 MMHG | TEMPERATURE: 98.7 F | OXYGEN SATURATION: 97 % | SYSTOLIC BLOOD PRESSURE: 130 MMHG | HEIGHT: 64 IN | WEIGHT: 212.8 LBS | BODY MASS INDEX: 36.33 KG/M2

## 2025-04-17 DIAGNOSIS — J40 BRONCHITIS: Primary | ICD-10-CM

## 2025-04-17 DIAGNOSIS — J30.2 SEASONAL ALLERGIES: ICD-10-CM

## 2025-04-17 LAB
SARS-COV-2 AG UPPER RESP QL IA: NEGATIVE
SL AMB POCT RAPID FLU A: NEGATIVE
SL AMB POCT RAPID FLU B: NEGATIVE
VALID CONTROL: NORMAL

## 2025-04-17 PROCEDURE — 99214 OFFICE O/P EST MOD 30 MIN: CPT | Performed by: FAMILY MEDICINE

## 2025-04-17 PROCEDURE — 87804 INFLUENZA ASSAY W/OPTIC: CPT | Performed by: FAMILY MEDICINE

## 2025-04-17 PROCEDURE — 87811 SARS-COV-2 COVID19 W/OPTIC: CPT | Performed by: FAMILY MEDICINE

## 2025-04-17 RX ORDER — FLUTICASONE PROPIONATE 50 MCG
1 SPRAY, SUSPENSION (ML) NASAL DAILY
Qty: 15.8 ML | Refills: 2 | Status: SHIPPED | OUTPATIENT
Start: 2025-04-17

## 2025-04-17 RX ORDER — ALBUTEROL SULFATE 90 UG/1
2 INHALANT RESPIRATORY (INHALATION) EVERY 6 HOURS PRN
Qty: 6.7 G | Refills: 5 | Status: SHIPPED | OUTPATIENT
Start: 2025-04-17 | End: 2025-04-22

## 2025-04-17 RX ORDER — PREDNISONE 20 MG/1
40 TABLET ORAL DAILY
Qty: 6 TABLET | Refills: 0 | Status: SHIPPED | OUTPATIENT
Start: 2025-04-17 | End: 2025-04-20

## 2025-04-17 RX ORDER — BROMPHENIRAMINE MALEATE, PSEUDOEPHEDRINE HYDROCHLORIDE, AND DEXTROMETHORPHAN HYDROBROMIDE 2; 30; 10 MG/5ML; MG/5ML; MG/5ML
5 SYRUP ORAL 4 TIMES DAILY PRN
Qty: 120 ML | Refills: 0 | Status: SHIPPED | OUTPATIENT
Start: 2025-04-17

## 2025-04-17 NOTE — ASSESSMENT & PLAN NOTE
1 day history of wheezing, congestion, rhinorrhea and sore throat.  Likely seasonal allergy exacerbation with concurrent bronchitis.  Patient has had these symptoms in the past  - Will use albuterol for wheezing  - Brompheniramine-pseudoephedrine-DM 4 times daily as needed for congestion, cough for allergies  - And prednisone 40 mg for 3 days to assist in anti-inflammation.  Orders:  •  albuterol (Proventil HFA) 90 mcg/act inhaler; Inhale 2 puffs every 6 (six) hours as needed for wheezing  •  brompheniramine-pseudoephedrine-DM 30-2-10 MG/5ML syrup; Take 5 mL by mouth 4 (four) times a day as needed for congestion, cough or allergies  •  predniSONE 20 mg tablet; Take 2 tablets (40 mg total) by mouth daily for 3 days  •  POCT Rapid Covid Ag  •  POCT rapid flu A and B

## 2025-04-17 NOTE — ASSESSMENT & PLAN NOTE
Recommending patient use Flonase daily during this time.  He may add on a antihistamine over-the-counter if needed.  Orders:  •  albuterol (Proventil HFA) 90 mcg/act inhaler; Inhale 2 puffs every 6 (six) hours as needed for wheezing  •  fluticasone (FLONASE) 50 mcg/act nasal spray; 1 spray into each nostril daily

## 2025-04-17 NOTE — PROGRESS NOTES
Name: Cricket Reyes      : 1975      MRN: [id number]  Encounter Provider: Shira Montes DO  Encounter Date: 2025   Encounter department: [facility]  :  Assessment & Plan  Bronchitis  1 day history of wheezing, congestion, rhinorrhea and sore throat.  Likely seasonal allergy exacerbation with concurrent bronchitis.  Patient has had these symptoms in the past  - Will use albuterol for wheezing  - Brompheniramine-pseudoephedrine-DM 4 times daily as needed for congestion, cough for allergies  - And prednisone 40 mg for 3 days to assist in anti-inflammation.  Orders:  •  albuterol (Proventil HFA) 90 mcg/act inhaler; Inhale 2 puffs every 6 (six) hours as needed for wheezing  •  brompheniramine-pseudoephedrine-DM 30-2-10 MG/5ML syrup; Take 5 mL by mouth 4 (four) times a day as needed for congestion, cough or allergies  •  predniSONE 20 mg tablet; Take 2 tablets (40 mg total) by mouth daily for 3 days  •  POCT Rapid Covid Ag  •  POCT rapid flu A and B    Seasonal allergies  Recommending patient use Flonase daily during this time.  He may add on a antihistamine over-the-counter if needed.  Orders:  •  albuterol (Proventil HFA) 90 mcg/act inhaler; Inhale 2 puffs every 6 (six) hours as needed for wheezing  •  fluticasone (FLONASE) 50 mcg/act nasal spray; 1 spray into each nostril daily           History of Present Illness   Patient coming in with URI symptoms that started last night.  Having some congestion, rhinorrhea, sore throat and wheezing.  No sick contacts.  Likely seasonal allergy exacerbation per history.  No fevers.    Cough  Associated symptoms include postnasal drip, a sore throat and wheezing. Pertinent negatives include no chest pain, chills, ear pain, fever, rash or shortness of breath.   Wheezing  Associated symptoms include coughing, a sore throat and wheezing. Pertinent negatives include no chest pain or palpitations.   Sore Throat   Associated symptoms include congestion and  "coughing. Pertinent negatives include no abdominal pain, ear pain, shortness of breath or vomiting.     Review of Systems   Constitutional:  Negative for chills and fever.   HENT:  Positive for congestion, postnasal drip, sinus pressure and sore throat. Negative for ear pain.    Eyes:  Negative for pain and visual disturbance.   Respiratory:  Positive for cough and wheezing. Negative for shortness of breath.    Cardiovascular:  Negative for chest pain and palpitations.   Gastrointestinal:  Negative for abdominal pain and vomiting.   Genitourinary:  Negative for dysuria and hematuria.   Musculoskeletal:  Negative for arthralgias and back pain.   Skin:  Negative for color change and rash.   Neurological:  Negative for seizures and syncope.   All other systems reviewed and are negative.      Objective   /60 (BP Location: Left arm, Patient Position: Sitting, Cuff Size: Standard)   Pulse 81   Temp 98.7 °F (37.1 °C) (Temporal)   Ht 5' 4\" (1.626 m)   Wt 96.5 kg (212 lb 12.8 oz)   SpO2 97%   BMI 36.53 kg/m²      Physical Exam  Vitals reviewed.   Constitutional:       General: He is not in acute distress.     Appearance: He is well-developed.   HENT:      Head: Normocephalic and atraumatic.      Right Ear: There is no impacted cerumen.      Left Ear: There is no impacted cerumen.      Nose: Congestion and rhinorrhea present.      Mouth/Throat:      Mouth: Mucous membranes are dry.      Pharynx: Posterior oropharyngeal erythema present.   Eyes:      Extraocular Movements: Extraocular movements intact.      Conjunctiva/sclera: Conjunctivae normal.      Pupils: Pupils are equal, round, and reactive to light.   Cardiovascular:      Rate and Rhythm: Normal rate and regular rhythm.      Pulses: Normal pulses.      Heart sounds: Normal heart sounds. No murmur heard.  Pulmonary:      Effort: Pulmonary effort is normal. No respiratory distress.      Breath sounds: Wheezing present.   Abdominal:      Palpations: Abdomen is " soft.      Tenderness: There is no abdominal tenderness.   Musculoskeletal:         General: No swelling.      Cervical back: Neck supple.   Skin:     General: Skin is warm and dry.      Capillary Refill: Capillary refill takes less than 2 seconds.   Neurological:      Mental Status: He is alert.   Psychiatric:         Mood and Affect: Mood normal.       Administrative Statements   I have spent a total time of 37 minutes in caring for this patient on the day of the visit/encounter including Diagnostic results, Prognosis, Risks and benefits of tx options, Instructions for management, Patient and family education, Importance of tx compliance, Risk factor reductions, Impressions, Counseling / Coordination of care, Documenting in the medical record, Reviewing/placing orders in the medical record (including tests, medications, and/or procedures), and Obtaining or reviewing history  .

## 2025-04-21 ENCOUNTER — TELEPHONE (OUTPATIENT)
Age: 50
End: 2025-04-21

## 2025-04-21 DIAGNOSIS — J40 BRONCHITIS: Primary | ICD-10-CM

## 2025-04-21 RX ORDER — AZITHROMYCIN 250 MG/1
TABLET, FILM COATED ORAL
Qty: 6 TABLET | Refills: 0 | Status: SHIPPED | OUTPATIENT
Start: 2025-04-21 | End: 2025-04-26

## 2025-04-22 ENCOUNTER — OFFICE VISIT (OUTPATIENT)
Dept: CARDIOLOGY CLINIC | Facility: CLINIC | Age: 50
End: 2025-04-22
Payer: COMMERCIAL

## 2025-04-22 VITALS
DIASTOLIC BLOOD PRESSURE: 58 MMHG | HEIGHT: 64 IN | WEIGHT: 210.4 LBS | BODY MASS INDEX: 35.92 KG/M2 | HEART RATE: 68 BPM | SYSTOLIC BLOOD PRESSURE: 115 MMHG

## 2025-04-22 DIAGNOSIS — I47.29 NONSUSTAINED VENTRICULAR TACHYCARDIA (HCC): ICD-10-CM

## 2025-04-22 DIAGNOSIS — I42.0 DILATED CARDIOMYOPATHY (HCC): Primary | ICD-10-CM

## 2025-04-22 DIAGNOSIS — I51.7 BIATRIAL ENLARGEMENT: ICD-10-CM

## 2025-04-22 DIAGNOSIS — I50.22 CHRONIC HFREF (HEART FAILURE WITH REDUCED EJECTION FRACTION) (HCC): ICD-10-CM

## 2025-04-22 DIAGNOSIS — I48.92 PAROXYSMAL ATRIAL FLUTTER (HCC): ICD-10-CM

## 2025-04-22 PROCEDURE — 99214 OFFICE O/P EST MOD 30 MIN: CPT | Performed by: INTERNAL MEDICINE

## 2025-04-22 NOTE — PATIENT INSTRUCTIONS
Assessment & Plan  Dilated cardiomyopathy (HCC)  Has dilated nonischemic cardiomyopathy with severely reduced left ventricular systolic function.  Last noted EF was 32% in December 2024.  And it was 25% by nuclear stress test.  Has normal coronary arteries by cardiac catheterization in February.  Appears well compensated on exam.  Blood pressure is stable.  Current heart failure with metoprolol tartrate 25 mg twice daily + empagliflozin 10 mg daily.  Plan was to begin ACE inhibitor or ARB repeat eventually.    Current cardiac medications: Losartan 25 g daily + empagliflozin 10 mg daily + metoprolol succinate 25 mg twice daily.    AHA class C NYHA class I-II heart failure.    Advising to continue current medications.  Will get a cardiac MRI to evaluate for scar given significant cardiac conduction abnormality and presence of scarring.  Advising to further cut down and quit alcohol as this may be the contributing cause for his cardiomyopathy and leads to arrhythmias through electrolyte imbalances.  We discussed today about defibrillator placement for primary prevention.  He currently is not open to it but will consider it in the future if recommended.  We will follow-up on the results of the MRI and then rediscuss it.  We will plan for a follow-up visit in 3 months but if he is feeling well and there is no change  and we are able to discuss the results of the MRI on the phone then appointment can be postponed to 6 months.  If there are abnormal findings on the cardiac MRI then we may need follow-up arranged with heart failure specialist or with cardiac electrophysiologist.  Dietary and medical compliance are reinforced.  He is advised  to report any concerning symptoms such as chest pain, shortness of breath, decline in exercise tolerance or presyncope/syncope.  Orders:    MRI cardiac  w wo contrast; Future    Chronic HFrEF (heart failure with reduced ejection fraction) ([facility])  Wt Readings from Last 3 Encounters:    04/22/25 95.4 kg (210 lb 6.4 oz)   04/17/25 96.5 kg (212 lb 12.8 oz)   04/02/25 95.3 kg (210 lb 3.2 oz)     Overall stable from heart failure perspective with no recent symptoms that suggest decompensated heart failure.  Blood pressure is borderline but stable.  Weight is stable.  On examination there is no pulmonary or peripheral vascular congestion.  Renal function is borderline but stable overall.    Medications as outlined above.  Advising to continue current medications.  Plan as outlined above.  Orders:    MRI cardiac  w wo contrast; Future    Paroxysmal atrial flutter (HCC)  Recent extended Holter monitoring did not identify recurrence of atrial flutter or atrial fibrillation.  There were several occurrences of nonsustained supraventricular tachycardia and ventricular tachycardia events.  Is on beta-blocker therapy.  Is asymptomatic.  Will continue current medications.  Advising him to call and let us know if he experiences any sustained palpitations or any presyncope/syncope events.  Orders:    MRI cardiac  w wo contrast; Future    Biatrial enlargement  Last echocardiogram in December 2024 showed moderate to severe left atrial enlargement.  Likely related to dilated nonischemic cardiomyopathy.  Will follow-up on the structural assessment of the cardiac MRI.  Plan as outlined above.

## 2025-04-22 NOTE — PROGRESS NOTES
Name: Cricket Reyes      : 1975      MRN: [id number]  Encounter Provider: Raymundo Graff MD  Encounter Date: 2025   Encounter department: [facility] CARDIOLOG[facility] [place]    DIAGNOSES:  1. Paroxysmal atrial flutter (2024 In ED with SOB and was in Atrial flutter with 2:1 block. )  2.  Dilated nonischemic cardiomyopathy with chronic heart failure with reduced ejection fraction  3. Moderate to severe left atrial enlargement  4. Nonspecific mitral and aortic valve disease  5.  History of childhood asthma, now resolved  6. History of cholecystectomy 2024 (had acute cholecystitis 2024), history of hernia repair, history of ACL repair as a child.    ZIO XT EXTENDED HOLTER MONITORING REVIEW REPORT   · Patient monitored for 13 days between 3/20/2025 and 2025.  Indication for monitoring was for detection of arrhythmias and dilated cardiomyopathy.  · Patient had a min HR of 45 bpm, max HR of 179 bpm, and avg HR of 67 bpm.  · Predominant underlying rhythm was Sinus Rhythm. Bundle Branch Block/IVCD was present.  · 4 Ventricular Tachycardia runs occurred, the run with the fastest interval lasting 6 beats with a max rate of 171 bpm, the longest lasting 12 beats with an avg rate of 119 bpm.  · 31 Supraventricular Tachycardia runs occurred, the run with the fastest interval lasting 11.5 secs with a max rate of 179 bpm, the longest lasting 40.7 secs with an avg rate of 121 bpm.  · Idioventricular Rhythm was present.  · Isolated SVEs were rare (<1.0%), SVE Couplets were rare (<1.0%), and SVE Triplets were rare (<1.0%). Isolated VEs were occasional (3.5%, 14076), VE Triplets were rare (<1.0%, 1), and no VE Couplets were present. Ventricular Bigeminy and Trigeminy were present.  · There were 0 triggered events and 1 diary entry.  Diary entry included feeling of skipped heartbeats while sleeping.  Rhythm correlation could not be performed as event occurred outside the duration of  monitoring.    TRANSTHORACIC ECHO 12/12/24; LVEF 32%, LVIDD 7.1 cm with global hypokinesis, grade II DD, moderate to severe LAE, aortic valve sclerosis, mitral leaflet sclerosis, ascending aortic root 4.2 cm    TRANSTHORACIC ECHO 02/05/24:  Dilated left ventricular cavity LVIDD 6.9 cm, mildly increased wall thickness, mild concentric left ventricular hypertrophy, moderately reduced left ventricular systolic function with global hypokinesis.  EF 40%.  Grade 2 diastolic dysfunction.  Normal right ventricular size and systolic function.  Mild biatrial enlargement.  Normal aortic valve, mild aortic valve regurgitation.  Normal mitral valve, moderate mitral valve regurgitation.  No tricuspid or pulmonic valve regurgitation.  Moderately dilated aortic root.  Ascending aorta was not commented upon.  Normal Favini given  No pericardial effusion    EXERCISE NUCLEAR STRESS TEST 1/31/2025:  ·  Stress Function: Left ventricular function post-stress is abnormal. Global function is moderate to severely reduced. Stress ejection fraction is 25%.  ·  Perfusion Defect Conclusion: The rest end diastolic cavity size is severely enlarged. The stress end diastolic cavity size is severely dilated.  ·  Perfusion: There is a left ventricular perfusion defect that is medium in size with moderate reduction in uptake present in the entire inferior location(s) that is predominantly fixed. The defect appears to be an artifact caused by subdiaphragmatic activity. There is a left ventricular perfusion defect that is small in size with mild reduction in uptake present in the apical to mid anterior location(s) that is reversible.  ·  Stress Combined Conclusion: Left ventricular perfusion is abnormal. Findings are consistent with ischemia.    CARDIAC CATHETERIZATION 2/14/2025:  Left main: Large, angiographically normal.  LAD: Large, angiographically normal.  LCx: Large, angiographically normal.  RCA: Large angiographically normal.  Distal myocardial  bridging present.  LVEDP was mildly increased.  There was no aortic valve disease by cath.    Negative screening test for thyroid dysfunction, negative REMBERTO screen.  Referral to Cardiology by Nestor Davila DO for atrial flutter, typical.      Assessment & Plan  Dilated cardiomyopathy (HCC)  Has dilated nonischemic cardiomyopathy with severely reduced left ventricular systolic function.  Last noted EF was 32% in December 2024.  And it was 25% by nuclear stress test.  Has normal coronary arteries by cardiac catheterization in February.  Appears well compensated on exam.  Blood pressure is stable.  Current heart failure with metoprolol tartrate 25 mg twice daily + empagliflozin 10 mg daily.  Plan was to begin ACE inhibitor or ARB repeat eventually.    Current cardiac medications: Losartan 25 g daily + empagliflozin 10 mg daily + metoprolol succinate 25 mg twice daily.    AHA class C NYHA class I-II heart failure.    Advising to continue current medications.  Will get a cardiac MRI to evaluate for scar given significant cardiac conduction abnormality and presence of scarring.  Advising to further cut down and quit alcohol as this may be the contributing cause for his cardiomyopathy and leads to arrhythmias through electrolyte imbalances.  We discussed today about defibrillator placement for primary prevention.  He currently is not open to it but will consider it in the future if recommended.  We will follow-up on the results of the MRI and then rediscuss it.  We will plan for a follow-up visit in 3 months but if he is feeling well and there is no change  and we are able to discuss the results of the MRI on the phone then appointment can be postponed to 6 months.  If there are abnormal findings on the cardiac MRI then we may need follow-up arranged with heart failure specialist or with cardiac electrophysiologist.  Dietary and medical compliance are reinforced.  He is advised  to report any concerning symptoms such  as chest pain, shortness of breath, decline in exercise tolerance or presyncope/syncope.  Orders:    MRI cardiac  w wo contrast; Future    Chronic HFrEF (heart failure with reduced ejection fraction) (HCC)  Wt Readings from Last 3 Encounters:   04/22/25 95.4 kg (210 lb 6.4 oz)   04/17/25 96.5 kg (212 lb 12.8 oz)   04/02/25 95.3 kg (210 lb 3.2 oz)     Overall stable from heart failure perspective with no recent symptoms that suggest decompensated heart failure.  Blood pressure is borderline but stable.  Weight is stable.  On examination there is no pulmonary or peripheral vascular congestion.  Renal function is borderline but stable overall.    Medications as outlined above.  Advising to continue current medications.  Plan as outlined above.  Orders:    MRI cardiac  w wo contrast; Future    Paroxysmal atrial flutter (HCC)  Recent extended Holter monitoring did not identify recurrence of atrial flutter or atrial fibrillation.  There were several occurrences of nonsustained supraventricular tachycardia and ventricular tachycardia events.  Is on beta-blocker therapy.  Is asymptomatic.  Will continue current medications.  Advising him to call and let us know if he experiences any sustained palpitations or any presyncope/syncope events.  Orders:    MRI cardiac  w wo contrast; Future    Biatrial enlargement  Last echocardiogram in December 2024 showed moderate to severe left atrial enlargement.  Likely related to dilated nonischemic cardiomyopathy.  Will follow-up on the structural assessment of the cardiac MRI.  Plan as outlined above.           History of Present Illness   HPI  Cricket Reyes is a 49 y.o. male who presents regarding follow-up of his history of paroxysmal atrial flutter, dilated cardiomyopathy with chronic heart failure with reduced ejection fraction, dilated aortic root and mitral and aortic valve disease.  He was recently seen in office on 3/11/2025.  A Discourse[employer] XT extended Holter monitor was  ordered.    History obtained from: patient    Since last visit he mentioned that he was dealing with symptoms suggestive of bronchitis.  He was prescribed antibiotic now and he has been feeling better.  From a cardiac perspective he has had no typical anginal-like chest pain.  He does get palpitations which occur randomly and occasionally.  Felt as transient fast heartbeat for a few seconds.  There is no associated chest pain or shortness of breath or lightheadedness or passing out or near passing out.  Other than that he does feel fatigued at times with exertion.  He continues to work as a .  He tried going back to gym but was not motivated so has not been doing it.  Denies any orthopnea or PND or worsening pedal edema.  Reports being compliant with medications.    Functional capacity status: Good   (Excellent- >10 METs; Good: (7-10 METs); Moderate (4-7 METs); Poor (<= 4 METs)    Any chronic stressors: None   (feeling of poor health, financial problems, and social isolation etc).    Tobacco or alcohol dependence: He has never been a smoker.  Reports that he drinks about 2-4 alcohol drinks usually rum in a week.  Denies any marijuana or any other drug use.    Current cardiac medications: Losartan 25 g daily + empagliflozin 10 mg daily + metoprolol succinate 25 mg twice daily    Last recent comprehensive blood work available:   Blood work 3/24/2025 sodium 139 potassium 4.8 chloride 106 bicarb 26 BUN 17 creatinine 1.1 EGFR 71    Normal LFTs on 2/10/2025  TSH 3.035 1/27/2025  Negative REMBERTO screen January 2025   on 1/27/2025  Lipid profile 2/10/2025  HDL 42  Calc LDL 93  No iron studies    The 10-year ASCVD risk score (Milind VARGAS, et al., 2019) is: 2.5%    Values used to calculate the score:      Age: 49 years      Sex: Male      Is Non- : No      Diabetic: No      Tobacco smoker: No      Systolic Blood Pressure: 115 mmHg      Is BP treated: No      HDL  Cholesterol: 42 mg/dL      Total Cholesterol: 159 mg/dL  (Low risk: Less than <5%; borderline risk: >=5% to <7.5%; intermediate risk: >=7.5% to <20%; high risk:>=20%)  Patient's ASCVD risk category: Low risk    Major cardiac risk factors: No family history of premature CAD or sudden cardiac death.  There is history related to drug use tests.  (Tobacco use, Hypertension, Family history of CHD, Primary severe hypercholesterolemia, DM, multiple major and risk enhancing factors)     Any cardiac clinical risk enhancers from available data: -- (Family history of premature CAD, patient's history of chronic kidney disease, primary hypercholesterolemia, metabolic syndrome, abnormal EZEQUIEL, inflammatory condition such as RA-HIV-psoriasis, RA-HIV-Psoriasis,, pregnancy related complications such as preeclampsia or premature delivery, early menopause, high risk ethnicity such as [place] , social deprivation, physical inactivity, nonalcoholic fatty liver disease psychosocial stress, major psychiatric illness, atrial fibrillation, left ventricular hypertrophy, obstructive sleep apnea, nonalcoholic fatty liver disease).    ECG: No results found for this visit on 04/22/25.    CURRENT MEDICATIONS LIST     Current Outpatient Medications:     azithromycin (Zithromax) 250 mg tablet, Take 2 tablets (500 mg total) by mouth daily for 1 day, THEN 1 tablet (250 mg total) daily for 4 days., Disp: 6 tablet, Rfl: 0    brompheniramine-pseudoephedrine-DM 30-2-10 MG/5ML syrup, Take 5 mL by mouth 4 (four) times a day as needed for congestion, cough or allergies, Disp: 120 mL, Rfl: 0    Cholecalciferol (VITAMIN D-3 PO), Take by mouth, Disp: , Rfl:     Empagliflozin (Jardiance) 10 MG TABS tablet, Take 1 tablet (10 mg total) by mouth every morning, Disp: 30 tablet, Rfl: 10    levOCARNitine (L-CARNITINE PO), Take by mouth, Disp: , Rfl:     losartan (COZAAR) 25 mg tablet, Take 1 tablet (25 mg total) by mouth daily, Disp: 90 tablet, Rfl: 3     "MAGNESIUM PO, Take by mouth, Disp: , Rfl:     Menaquinone-7 (VITAMIN K2 PO), Take by mouth, Disp: , Rfl:     metoprolol succinate (TOPROL-XL) 25 mg 24 hr tablet, Take 1 tablet (25 mg total) by mouth 2 (two) times a day, Disp: 180 tablet, Rfl: 3    multivitamin (THERAGRAN) TABS, Take 1 tablet by mouth daily, Disp: , Rfl:     Omega-3 Fatty Acids (FISH OIL PO), Take by mouth, Disp: , Rfl:     Riboflavin (VITAMIN B-2 PO), Take by mouth, Disp: , Rfl:     VITAMIN E PO, Take by mouth, Disp: , Rfl:     albuterol (Proventil HFA) 90 mcg/act inhaler, Inhale 2 puffs every 6 (six) hours as needed for wheezing (Patient not taking: Reported on 4/22/2025), Disp: 6.7 g, Rfl: 5    aspirin 81 mg chewable tablet, Chew 1 tablet (81 mg total) daily (Patient not taking: Reported on 4/2/2025), Disp: 90 tablet, Rfl: 0    atorvastatin (LIPITOR) 40 mg tablet, Take 1 tablet (40 mg total) by mouth daily (Patient not taking: Reported on 4/2/2025), Disp: 90 tablet, Rfl: 0    fluticasone (FLONASE) 50 mcg/act nasal spray, 1 spray into each nostril daily (Patient not taking: Reported on 4/22/2025), Disp: 15.8 mL, Rfl: 2       ALLERGIES     Allergies   Allergen Reactions    Xarelto [Rivaroxaban] Rash       *-*-*-*-*-*-*-*-*-*-*-*-*-*-*-*-*-*-*-*-*-*-*-*-*-*-*-*-*-*-*-*-*-*-*-*-*-*-*-*-*-*-*-*-*-*-*-*-*-*-*-*-*-*-      Review of Systems   Constitutional: Negative.    HENT: Negative.     Respiratory: Negative.     Cardiovascular: Negative.    Gastrointestinal: Negative.    Musculoskeletal: Negative.    Skin: Negative.    Neurological: Negative.    Psychiatric/Behavioral: Negative.            Objective   /58   Pulse 68   Ht 5' 4\" (1.626 m)   Wt 95.4 kg (210 lb 6.4 oz)   BMI 36.12 kg/m²      Physical Exam  Constitutional:       Appearance: He is well-developed.   HENT:      Mouth/Throat:      Mouth: Mucous membranes are moist.   Cardiovascular:      Rate and Rhythm: Regular rhythm.      Heart sounds: Normal heart sounds. No murmur " heard.  Pulmonary:      Effort: Pulmonary effort is normal.      Breath sounds: Normal breath sounds.   Abdominal:      Palpations: Abdomen is soft.   Musculoskeletal:      Cervical back: Neck supple.      Right lower leg: No edema.      Left lower leg: No edema.   Skin:     General: Skin is dry.   Neurological:      Mental Status: He is alert and oriented to person, place, and time. Mental status is at baseline.   Psychiatric:         Mood and Affect: Mood normal.         Behavior: Behavior normal.

## 2025-05-01 ENCOUNTER — OFFICE VISIT (OUTPATIENT)
Dept: PODIATRY | Facility: CLINIC | Age: 50
End: 2025-05-01
Payer: COMMERCIAL

## 2025-05-01 DIAGNOSIS — M79.675 CHRONIC PAIN OF TOE OF LEFT FOOT: ICD-10-CM

## 2025-05-01 DIAGNOSIS — G57.62 MORTON'S NEUROMA OF LEFT FOOT: Primary | ICD-10-CM

## 2025-05-01 DIAGNOSIS — G89.29 CHRONIC PAIN OF TOE OF LEFT FOOT: ICD-10-CM

## 2025-05-01 PROCEDURE — 64455 NJX AA&/STRD PLTR COM DG NRV: CPT | Performed by: PODIATRIST

## 2025-05-01 PROCEDURE — 99203 OFFICE O/P NEW LOW 30 MIN: CPT | Performed by: PODIATRIST

## 2025-05-01 NOTE — PROGRESS NOTES
Name: Cricket Reyes      : 1975      MRN: [id number]  Encounter Provider: Slava Bahena DPM  Encounter Date: 2025   Encounter department: 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    Discussed etiology and treatment options recommending cortisone injection.  Explained that this medication has a 70 to 80% success rate however a series of injections may be needed.  If this is not successful, orthotics are a treatment option as a surgical intervention.    Injected third metatarsal interspace left foot with 0.5 cc Kenalog 40 along with 1 cc 1% Xylocaine.  Reappoint 4 weeks.  :  Assessment & Plan  Chronic pain of toe of left foot    Orders:    Ambulatory Referral to Podiatry    Domingo's neuroma of left foot         Cortisone injection  Orthotics  MRI  Surgery    History of Present Illness   HPI  Cricket Reyes is a 49 y.o. male who presents for assessment of his left fourth toe.  Patient notes that for the past 6 months to 1 year he has had intermittent burning and shooting pain into the fourth toe.  He relates no trauma.  He states that the flares are off-and-on and occur only when walking.  He does not have pain when trying to sleep.  He denies right foot discomfort.    He relates having had x-rays that were read as negative for pathology.    I personally reviewed x-rays dated 10/25/2024.  They were negative for osseous pathology.          Review of Systems   Cardiovascular:         History of cardiac disease   Musculoskeletal:  Positive for gait problem.   Psychiatric/Behavioral: Negative.                Objective   There were no vitals taken for this visit.     Physical Exam  Constitutional:       Appearance: Normal appearance.   Cardiovascular:      Pulses: Normal pulses.   Musculoskeletal:         General: No swelling.   Skin:     General: Skin is warm.   Neurological:       General: No focal deficit present.      Mental Status: He is oriented to person, place, and time.      Comments: Melvin sign negative third metatarsal interspace left foot.         Nerve block    Date/Time: 5/1/2025 5:00 PM    Performed by: Slava Bahena DPM  Authorized by: Slava Bahena DPM    Patient location:  [facility]  [employer] [place] Protocol:  procedure performed by consultantConsent: Verbal consent obtained.  Risks and benefits: risks, benefits and alternatives were discussed  Consent given by: patient  Patient understanding: patient states understanding of the procedure being performed  Patient identity confirmed: verbally with patient    Indications:     Indications:  Pain relief  Location:     Body area:  Lower extremity    Lower extremity nerve:  Digital    Laterality:  Left  Skin anesthesia (see MAR for exact dosages):     Skin anesthesia method:  Local infiltration    Local anesthetic:  Lidocaine 1% w/o epi  Procedure details (see MAR for exact dosages):     Block needle gauge:  25 G    Anesthetic injected:  Lidocaine 1% w/o epi    Steroid injected:  Triamcinolone    Injection procedure:  Anatomic landmarks identified  Post-procedure details:     Outcome:  Anesthesia achieved    Patient tolerance of procedure:  Tolerated well, no immediate complications

## 2025-05-15 ENCOUNTER — HOSPITAL ENCOUNTER (OUTPATIENT)
Dept: MRI IMAGING | Facility: HOSPITAL | Age: 50
Discharge: HOME | End: 2025-05-15
Attending: INTERNAL MEDICINE
Payer: COMMERCIAL

## 2025-05-15 DIAGNOSIS — I50.22 CHRONIC HFREF (HEART FAILURE WITH REDUCED EJECTION FRACTION) (HCC): ICD-10-CM

## 2025-05-15 DIAGNOSIS — I47.29 NONSUSTAINED VENTRICULAR TACHYCARDIA (HCC): ICD-10-CM

## 2025-05-15 DIAGNOSIS — I42.0 DILATED CARDIOMYOPATHY (HCC): ICD-10-CM

## 2025-05-15 DIAGNOSIS — I48.92 PAROXYSMAL ATRIAL FLUTTER (HCC): ICD-10-CM

## 2025-05-15 PROCEDURE — A9585 GADOBUTROL INJECTION: HCPCS | Performed by: INTERNAL MEDICINE

## 2025-05-15 PROCEDURE — 75561 CARDIAC MRI FOR MORPH W/DYE: CPT

## 2025-05-15 RX ORDER — GADOBUTROL 604.72 MG/ML
18 INJECTION INTRAVENOUS
Status: COMPLETED | OUTPATIENT
Start: 2025-05-15 | End: 2025-05-15

## 2025-05-15 RX ADMIN — GADOBUTROL 18 ML: 604.72 INJECTION INTRAVENOUS at 20:16

## 2025-05-29 ENCOUNTER — HOSPITAL ENCOUNTER (OUTPATIENT)
Dept: NON INVASIVE DIAGNOSTICS | Facility: HOSPITAL | Age: 50
Discharge: HOME | End: 2025-05-29
Attending: INTERNAL MEDICINE
Payer: COMMERCIAL

## 2025-05-29 VITALS
SYSTOLIC BLOOD PRESSURE: 112 MMHG | DIASTOLIC BLOOD PRESSURE: 55 MMHG | BODY MASS INDEX: 35.85 KG/M2 | HEART RATE: 63 BPM | WEIGHT: 210 LBS | HEIGHT: 64 IN

## 2025-05-29 DIAGNOSIS — I50.22 CHRONIC HFREF (HEART FAILURE WITH REDUCED EJECTION FRACTION) (HCC): ICD-10-CM

## 2025-05-29 DIAGNOSIS — I42.0 DILATED CARDIOMYOPATHY (HCC): ICD-10-CM

## 2025-05-29 PROCEDURE — 93325 DOPPLER ECHO COLOR FLOW MAPG: CPT | Performed by: INTERNAL MEDICINE

## 2025-05-29 PROCEDURE — 93308 TTE F-UP OR LMTD: CPT

## 2025-05-29 PROCEDURE — 93325 DOPPLER ECHO COLOR FLOW MAPG: CPT

## 2025-05-29 PROCEDURE — 93308 TTE F-UP OR LMTD: CPT | Performed by: INTERNAL MEDICINE

## 2025-05-29 PROCEDURE — 93321 DOPPLER ECHO F-UP/LMTD STD: CPT

## 2025-05-29 PROCEDURE — 93321 DOPPLER ECHO F-UP/LMTD STD: CPT | Performed by: INTERNAL MEDICINE

## 2025-06-01 LAB
AORTIC VALVE MEAN VELOCITY: 13.8 M/S
AV AREA BY CONTINUOUS VTI: 3.1 CM2
AV AREA PEAK VELOCITY: 3.3 CM2
AV LVOT MEAN GRADIENT: 2 MMHG
AV LVOT PEAK GRADIENT: 5 MMHG
AV MEAN PRESS GRAD SYS DOP V1V2: 9 MMHG
AV ORIFICE AREA US: 3.13 CM2
AV PEAK GRADIENT: 15 MMHG
AV REGURGITATION PRESSURE HALF TIME: 360 MS
AV VELOCITY RATIO: 0.51
AV VMAX SYS DOP: 1.96 M/S
BSA FOR ECHO PROCEDURE: 2 M2
DOP CALC AO VTI: 44.23 CM
DOP CALC LVOT AREA: 6.15 CM2
DOP CALC LVOT CARDIAC INDEX: 4.02 L/MIN/M2
DOP CALC LVOT CARDIAC OUTPUT: 8.04 L/MIN
DOP CALC LVOT DIAMETER: 2.8 CM
DOP CALC LVOT PEAK VEL VTI: 22.48 CM
DOP CALC LVOT PEAK VEL: 1.06 M/S
DOP CALC LVOT STROKE INDEX: 67 ML/M2
DOP CALC LVOT STROKE VOLUME: 138.35
E WAVE DECELERATION TIME: 159 MS
E/A RATIO: 1.62
FRACTIONAL SHORTENING: 17 (ref 28–44)
INTERVENTRICULAR SEPTUM IN DIASTOLE (PARASTERNAL SHORT AXIS VIEW): 1 CM
INTERVENTRICULAR SEPTUM: 1 CM (ref 0.6–1.1)
LEFT INTERNAL DIMENSION IN SYSTOLE: 6.2 CM (ref 2.1–4)
LEFT VENTRICLE DIASTOLIC VOLUME (MOD BIPLANE): 301 ML
LEFT VENTRICLE DIASTOLIC VOLUME INDEX (MOD BIPLANE): 150.5 ML/M2
LEFT VENTRICLE SYSTOLIC VOLUME (MOD BIPLANE): 210 ML
LEFT VENTRICLE SYSTOLIC VOLUME INDEX (MOD BIPLANE): 105 ML/M2
LEFT VENTRICULAR INTERNAL DIMENSION IN DIASTOLE: 7.5 CM (ref 3.5–6)
LEFT VENTRICULAR POSTERIOR WALL IN END DIASTOLE: 1 CM
LEFT VENTRICULAR STROKE VOLUME: 104 ML
LV EF BIPLANE MOD: 30 %
LV EF US.2D.A4C+ESTIMATED: 34 %
LVSV (TEICH): 104 ML
MV PEAK A VEL: 0.56 M/S
MV PEAK E VEL: 91 CM/S
SL CV AV DECELERATION TIME RETROGRADE: 1241 MS
SL CV AV PEAK GRADIENT RETROGRADE: 79 MMHG
SL CV PED ECHO LEFT VENTRICLE DIASTOLIC VOLUME (MOD BIPLANE) 2D: 301 ML
SL CV PED ECHO LEFT VENTRICLE SYSTOLIC VOLUME (MOD BIPLANE) 2D: 197 ML
TRICUSPID ANNULAR PLANE SYSTOLIC EXCURSION: 2.1 CM

## 2025-06-02 ENCOUNTER — OFFICE VISIT (OUTPATIENT)
Dept: CARDIOLOGY CLINIC | Facility: CLINIC | Age: 50
End: 2025-06-02
Payer: COMMERCIAL

## 2025-06-02 VITALS
BODY MASS INDEX: 36.37 KG/M2 | HEIGHT: 64 IN | SYSTOLIC BLOOD PRESSURE: 117 MMHG | WEIGHT: 213 LBS | DIASTOLIC BLOOD PRESSURE: 58 MMHG | HEART RATE: 60 BPM

## 2025-06-02 DIAGNOSIS — I51.7 BIATRIAL ENLARGEMENT: ICD-10-CM

## 2025-06-02 DIAGNOSIS — I50.22 CHRONIC HFREF (HEART FAILURE WITH REDUCED EJECTION FRACTION) (HCC): ICD-10-CM

## 2025-06-02 DIAGNOSIS — I42.0 DILATED CARDIOMYOPATHY (HCC): Primary | ICD-10-CM

## 2025-06-02 PROCEDURE — 99214 OFFICE O/P EST MOD 30 MIN: CPT | Performed by: INTERNAL MEDICINE

## 2025-06-02 RX ORDER — SACUBITRIL AND VALSARTAN 24; 26 MG/1; MG/1
1 TABLET, FILM COATED ORAL 2 TIMES DAILY
Qty: 60 TABLET | Refills: 3 | Status: SHIPPED | OUTPATIENT
Start: 2025-06-02

## 2025-06-02 NOTE — ASSESSMENT & PLAN NOTE
Has dilated nonischemic cardiomyopathy.  Recent echocardiogram is significant for marked dilatation of left ventricular cavity, decreased global artery resting, EF is around 30%.  Cardiac MRI showed similar EF.  There was no significant valvular regurgitation and there was no obvious pulmonary hypertension.  Aortic root was dilated.  Has history of nonsustained VT on Zio monitoring in the past.  Is asymptomatic.  He has atypical left bundle branch block on ECG with QRS duration of 142 ms.    Has AHA class C heart failure, NYHA class I-II status.    Today I reviewed the findings of echocardiogram and the cardiac MRI.  Patient feels a little bit offended that I am giving a negative picture of his heart and that he does not want to be reminded about the findings.  He is open to discussion about medications.    Explained to him about guideline based medical therapy for heart failure.  At this time I am recommending the following:    Will replace losartan with Entresto 24-26 twice daily.  Will need a follow-up basic metabolic panel about 2 weeks after making the change to reassess renal function and electrolyte.  Will continue other medications.  Referring him to heart failure colleagues for further optimization of medical regimen.  We also discussed about defibrillator for primary prevention.  He wants to discuss more about this.  I will refer him to cardiac electrophysiologist for discussion about pros and cons and precautions.    Since he was offended by my recommendations and bringing up issues relating to changes in his cardiac function.  I am offering him to arrange for second opinion as well.  He will consider this.  Orders:    sacubitril-valsartan (Entresto) 24-26 MG TABS; Take 1 tablet by mouth 2 (two) times a day    Ambulatory referral to Cardiac Electrophysiology; Future    Basic metabolic panel

## 2025-06-02 NOTE — PATIENT INSTRUCTIONS
Assessment & Plan  Dilated cardiomyopathy (HCC)  Has dilated nonischemic cardiomyopathy.  Recent echocardiogram is significant for marked dilatation of left ventricular cavity, decreased global artery resting, EF is around 30%.  Cardiac MRI showed similar EF.  There was no significant valvular regurgitation and there was no obvious pulmonary hypertension.  Aortic root was dilated.  Has history of nonsustained VT on Zio monitoring in the past.  Is asymptomatic.  He has atypical left bundle branch block on ECG with QRS duration of 142 ms.    Has AHA class C heart failure, NYHA class I-II status.    Today I reviewed the findings of echocardiogram and the cardiac MRI.  Patient feels a little bit offended that I am giving a negative picture of his heart and that he does not want to be reminded about the findings.  He is open to discussion about medications.    Explained to him about guideline based medical therapy for heart failure.  At this time I am recommending the following:    Will replace losartan with Entresto 24-26 twice daily.  Will need a follow-up basic metabolic panel about 2 weeks after making the change to reassess renal function and electrolyte.  Will continue other medications.  Referring him to heart failure colleagues for further optimization of medical regimen.  We also discussed about defibrillator for primary prevention.  He wants to discuss more about this.  I will refer him to cardiac electrophysiologist for discussion about pros and cons and precautions.    Since he was offended by my recommendations and bringing up issues relating to changes in his cardiac function.  I am offering him to arrange for second opinion as well.  He will consider this.  Orders:    sacubitril-valsartan (Entresto) 24-26 MG TABS; Take 1 tablet by mouth 2 (two) times a day    Ambulatory referral to Cardiac Electrophysiology; Future    Basic metabolic panel    Chronic HFrEF (heart failure with reduced ejection fraction)  ([facility])  Wt Readings from Last 3 Encounters:   06/02/25 96.6 kg (213 lb)   05/29/25 95.3 kg (210 lb)   04/22/25 95.4 kg (210 lb 6.4 oz)     Is reasonably well compensated.  Currently is not on any diuretic.  There is no evidence of pulmonary or peripheral vascular congestion.  There is Marked left ventricular enlargement on echocardiogram.  Reviewed with him again warning signs of decompensated heart failure.  Advised to monitor weight regularly and monitor for symptoms of heart failure and report to us if he is gaining weight or has shortness of breath or orthopnea or PND.  Then will start diuretic regimen.          Orders:    sacubitril-valsartan (Entresto) 24-26 MG TABS; Take 1 tablet by mouth 2 (two) times a day    Ambulatory referral to Cardiac Electrophysiology; Future    Basic metabolic panel    Biatrial enlargement  Recent echocardiogram demonstrated normal atrial size.  I will request a 6-month follow-up with me but he should be following with heart failure specialist and electrophysiologist before that.

## 2025-06-02 NOTE — ASSESSMENT & PLAN NOTE
Wt Readings from Last 3 Encounters:   06/02/25 96.6 kg (213 lb)   05/29/25 95.3 kg (210 lb)   04/22/25 95.4 kg (210 lb 6.4 oz)     Is reasonably well compensated.  Currently is not on any diuretic.  There is no evidence of pulmonary or peripheral vascular congestion.  There is Marked left ventricular enlargement on echocardiogram.  Reviewed with him again warning signs of decompensated heart failure.  Advised to monitor weight regularly and monitor for symptoms of heart failure and report to us if he is gaining weight or has shortness of breath or orthopnea or PND.  Then will start diuretic regimen.          Orders:    sacubitril-valsartan (Entresto) 24-26 MG TABS; Take 1 tablet by mouth 2 (two) times a day    Ambulatory referral to Cardiac Electrophysiology; Future    Basic metabolic panel

## 2025-06-02 NOTE — PROGRESS NOTES
Name: Cricket Reyes      : 1975      MRN: [id number]  Encounter Provider: Raymundo Graff MD  Encounter Date: 2025   Encounter department: [facility]    DIAGNOSES:  1. Paroxysmal atrial flutter (2024 In ED with SOB and was in Atrial flutter with 2:1 block. )  2.  Dilated nonischemic cardiomyopathy with chronic heart failure with reduced ejection fraction  3. Moderate to severe left atrial enlargement  4. Nonspecific mitral and aortic valve disease  5.  History of childhood asthma, now resolved  6. History of cholecystectomy 2024 (had acute cholecystitis 2024), history of hernia repair, history of ACL repair as a child.    LIMITED ECHOCARDIOGRAM 2025:  · Markedly dilated left ventricle cavity, LVIDD 7.5 cm, normal wall thickness, severely reduced left ventricular systolic function with global hypokinesis.  Ejection fraction is estimated as around 30%.  Suspected grade 2 diastolic dysfunction.  Decreased global longitudinal strain, -11.9%.  · Normal right ventricular size and systolic function.  · Normal left and right atrial cavity size by visual assessment.  · Aortic valve leaflet sclerosis, severe aortic valve regurgitation by color flow Doppler assessment (although it appears moderate by pressure half-time Doppler assessment).  · Mitral valve leaflet sclerosis, trace mitral valve regurgitation.  · Trace tricuspid and pulmonic valve regurgitation.  · No obvious pulmonary hypertension.  · No pericardial effusion  · Dilated aortic root.     CARDIAC MRI5/15/2025:  1. Severely dilated left ventricle with moderately to severely reduced ejection fraction. Normal LV wall thickness. No gross regional wall motion abnormalities, noting artifact from ectopy limits evaluation.  2. Normal right ventricular size and systolic function.  3. The aortic, mitral, and tricuspid valves open without restriction. Aortic and tricuspid regurgitation visualized.  4. The left atrium is  normal in size. The aortic root is dilated.  5. There is no evidence of myocardial edema.  6. Delayed post-gadolinium imaging demonstrates no region of hyperenhancement.    ZIO XT EXTENDED HOLTER MONITORING REVIEW REPORT     · Patient had a min HR of 45 bpm, max HR of 179 bpm, and avg HR of 67 bpm.  · Predominant underlying rhythm was Sinus Rhythm. Bundle Branch Block/IVCD was present.  · 4 Ventricular Tachycardia runs occurred, the run with the fastest interval lasting 6 beats with a max rate of 171 bpm, the longest lasting 12 beats with an avg rate of 119 bpm.  · 31 Supraventricular Tachycardia runs occurred, the run with the fastest interval lasting 11.5 secs with a max rate of 179 bpm, the longest lasting 40.7 secs with an avg rate of 121 bpm.  · Idioventricular Rhythm was present.  · Isolated SVEs were rare (<1.0%), SVE Couplets were rare (<1.0%), and SVE Triplets were rare (<1.0%). Isolated VEs were occasional (3.5%, 88700), VE Triplets were rare (<1.0%, 1), and no VE Couplets were present. Ventricular Bigeminy and Trigeminy were present.    TRANSTHORACIC ECHO 12/12/24; LVEF 32%, LVIDD 7.1 cm with global hypokinesis, grade II DD, moderate to severe LAE, aortic valve sclerosis, mitral leaflet sclerosis, ascending aortic root 4.2 cm    TRANSTHORACIC ECHO 02/05/24: LVIDD 6.9 cm, mild LVH, EF 40% grade 2 diastolic dysfunction, mild AI moderate MR.    EXERCISE NUCLEAR STRESS TEST 1/31/2025:   There is a left ventricular perfusion defect that is medium in size with moderate reduction in uptake present in the entire inferior location(s) that is predominantly fixed. The defect appears to be an artifact caused by subdiaphragmatic activity. There is a left ventricular perfusion defect that is small in size with mild reduction in uptake present in the apical to mid anterior location(s) that is reversible.  ·  Stress Combined Conclusion: Left ventricular perfusion is abnormal. Findings are consistent with  ischemia.    CARDIAC CATHETERIZATION 2/14/2025:  Left main: Large, angiographically normal.  LAD: Large, angiographically normal.  LCx: Large, angiographically normal.  RCA: Large angiographically normal.  Distal myocardial bridging present.  LVEDP was mildly increased.  There was no aortic valve disease by cath.      Assessment & Plan  Dilated cardiomyopathy (HCC)  Has dilated nonischemic cardiomyopathy.  Recent echocardiogram is significant for marked dilatation of left ventricular cavity, decreased global artery resting, EF is around 30%.  Cardiac MRI showed similar EF.  There was no significant valvular regurgitation and there was no obvious pulmonary hypertension.  Aortic root was dilated.  Has history of nonsustained VT on Zio monitoring in the past.  Is asymptomatic.  He has atypical left bundle branch block on ECG with QRS duration of 142 ms.    Has AHA class C heart failure, NYHA class I-II status.    Today I reviewed the findings of echocardiogram and the cardiac MRI.  Patient feels a little bit offended that I am giving a negative picture of his heart and that he does not want to be reminded about the findings.  He is open to discussion about medications.    Explained to him about guideline based medical therapy for heart failure.  At this time I am recommending the following:    Will replace losartan with Entresto 24-26 twice daily.  Will need a follow-up basic metabolic panel about 2 weeks after making the change to reassess renal function and electrolyte.  Will continue other medications.  Referring him to heart failure colleagues for further optimization of medical regimen.  We also discussed about defibrillator for primary prevention.  He wants to discuss more about this.  I will refer him to cardiac electrophysiologist for discussion about pros and cons and precautions.    Since he was offended by my recommendations and bringing up issues relating to changes in his cardiac function.  I am offering  him to arrange for second opinion as well.  He will consider this.  Orders:    sacubitril-valsartan (Entresto) 24-26 MG TABS; Take 1 tablet by mouth 2 (two) times a day    Ambulatory referral to Cardiac Electrophysiology; Future    Basic metabolic panel    Chronic HFrEF (heart failure with reduced ejection fraction) ([facility])  Wt Readings from Last 3 Encounters:   06/02/25 96.6 kg (213 lb)   05/29/25 95.3 kg (210 lb)   04/22/25 95.4 kg (210 lb 6.4 oz)     Is reasonably well compensated.  Currently is not on any diuretic.  There is no evidence of pulmonary or peripheral vascular congestion.  There is Marked left ventricular enlargement on echocardiogram.  Reviewed with him again warning signs of decompensated heart failure.  Advised to monitor weight regularly and monitor for symptoms of heart failure and report to us if he is gaining weight or has shortness of breath or orthopnea or PND.  Then will start diuretic regimen.          Orders:    sacubitril-valsartan (Entresto) 24-26 MG TABS; Take 1 tablet by mouth 2 (two) times a day    Ambulatory referral to Cardiac Electrophysiology; Future    Basic metabolic panel    Biatrial enlargement  Recent echocardiogram demonstrated normal atrial size.  I will request a 6-month follow-up with me but he should be following with heart failure specialist and electrophysiologist before that.           History of Present Illness   HPI  Cricket Reyes is a 49 y.o. male who presents regarding follow-up of his history of paroxysmal atrial flutter, dilated cardiomyopathy with chronic heart failure with reduced ejection fraction, dilated aortic root and mitral and aortic valve disease.  He was last seen by me in April 2024.  He was referred to undergo a follow-up echocardiogram blood work and a cardiac MR which have since been completed.    History obtained from: patient    He mentions that since last visit he has had no new symptoms including no unusual chest pain or shortness of breath or  dizziness or lightheadedness or palpitations.  Denies any orthopnea or PND or pedal edema.  Denies any decline in his exercise tolerance.  Continues to work as a .    Functional capacity status: Good   (Excellent- >10 METs; Good: (7-10 METs); Moderate (4-7 METs); Poor (<= 4 METs)    Any chronic stressors: None   (feeling of poor health, financial problems, and social isolation etc).    Tobacco or alcohol dependence: He has never been a smoker.  Reports that he drinks about 2-4 alcohol drinks usually rum in a week.  Denies any marijuana or any other drug use.    Current cardiac medications: Losartan 25 g daily + empagliflozin 10 mg daily + metoprolol succinate 25 mg twice daily    Last recent comprehensive blood work available:   Blood work 3/24/2025 sodium 139 potassium 4.8 chloride 106 bicarb 26 BUN 17 creatinine 1.1 EGFR 71    Normal LFTs on 2/10/2025  TSH 3.035 1/27/2025  Negative REMBERTO screen January 2025   on 1/27/2025  Lipid profile 2/10/2025  HDL 42  Calc LDL 93  No iron studies    The 10-year ASCVD risk score (Milind VARGAS, et al., 2019) is: 2.6%    Values used to calculate the score:      Age: 49 years      Clincally relevant sex: Male      Is Non- : No      Diabetic: No      Tobacco smoker: No      Systolic Blood Pressure: 117 mmHg      Is BP treated: No      HDL Cholesterol: 42 mg/dL      Total Cholesterol: 159 mg/dL  (Low risk: Less than <5%; borderline risk: >=5% to <7.5%; intermediate risk: >=7.5% to <20%; high risk:>=20%)  Patient's ASCVD risk category: Low risk    Major cardiac risk factors: No family history of premature CAD or sudden cardiac death.  There is history related to drug use tests.  (Tobacco use, Hypertension, Family history of CHD, Primary severe hypercholesterolemia, DM, multiple major and risk enhancing factors)     Any cardiac clinical risk enhancers from available data: -- (Family history of premature CAD, patient's  history of chronic kidney disease, primary hypercholesterolemia, metabolic syndrome, abnormal EZEQUIEL, inflammatory condition such as RA-HIV-psoriasis, RA-HIV-Psoriasis,, pregnancy related complications such as preeclampsia or premature delivery, early menopause, high risk ethnicity such as [place] , social deprivation, physical inactivity, nonalcoholic fatty liver disease psychosocial stress, major psychiatric illness, atrial fibrillation, left ventricular hypertrophy, obstructive sleep apnea, nonalcoholic fatty liver disease).    ECG: No results found for this visit on 06/02/25.    CURRENT MEDICATIONS LIST     Current Outpatient Medications:     Cholecalciferol (VITAMIN D-3 PO), Take by mouth, Disp: , Rfl:     Empagliflozin (Jardiance) 10 MG TABS tablet, Take 1 tablet (10 mg total) by mouth every morning, Disp: 30 tablet, Rfl: 10    levOCARNitine (L-CARNITINE PO), Take by mouth, Disp: , Rfl:     MAGNESIUM PO, Take by mouth, Disp: , Rfl:     Menaquinone-7 (VITAMIN K2 PO), Take by mouth, Disp: , Rfl:     metoprolol succinate (TOPROL-XL) 25 mg 24 hr tablet, Take 1 tablet (25 mg total) by mouth 2 (two) times a day, Disp: 180 tablet, Rfl: 3    multivitamin (THERAGRAN) TABS, Take 1 tablet by mouth in the morning., Disp: , Rfl:     Omega-3 Fatty Acids (FISH OIL PO), Take by mouth, Disp: , Rfl:     sacubitril-valsartan (Entresto) 24-26 MG TABS, Take 1 tablet by mouth 2 (two) times a day, Disp: 60 tablet, Rfl: 3    VITAMIN E PO, Take by mouth, Disp: , Rfl:     brompheniramine-pseudoephedrine-DM 30-2-10 MG/5ML syrup, Take 5 mL by mouth 4 (four) times a day as needed for congestion, cough or allergies (Patient not taking: Reported on 6/2/2025), Disp: 120 mL, Rfl: 0    fluticasone (FLONASE) 50 mcg/act nasal spray, 1 spray into each nostril daily (Patient not taking: Reported on 4/22/2025), Disp: 15.8 mL, Rfl: 2    Riboflavin (VITAMIN B-2 PO), Take by mouth (Patient not taking: Reported on 6/2/2025), Disp: , Rfl:       "  ALLERGIES     Allergies   Allergen Reactions    Xarelto [Rivaroxaban] Rash       *-*-*-*-*-*-*-*-*-*-*-*-*-*-*-*-*-*-*-*-*-*-*-*-*-*-*-*-*-*-*-*-*-*-*-*-*-*-*-*-*-*-*-*-*-*-*-*-*-*-*-*-*-*-      Review of Systems   Constitutional: Negative.    HENT: Negative.     Respiratory: Negative.     Cardiovascular: Negative.    Gastrointestinal: Negative.    Musculoskeletal: Negative.    Skin: Negative.    Neurological: Negative.    Psychiatric/Behavioral: Negative.            Objective   /58   Pulse 60   Ht 5' 4\" (1.626 m)   Wt 96.6 kg (213 lb)   BMI 36.56 kg/m²      Physical Exam  Constitutional:       Appearance: He is well-developed.   HENT:      Mouth/Throat:      Mouth: Mucous membranes are moist.     Cardiovascular:      Rate and Rhythm: Regular rhythm.      Heart sounds: Normal heart sounds. No murmur heard.  Pulmonary:      Effort: Pulmonary effort is normal.      Breath sounds: Normal breath sounds.   Abdominal:      Palpations: Abdomen is soft.     Musculoskeletal:      Cervical back: Neck supple.      Right lower leg: No edema.      Left lower leg: No edema.     Skin:     General: Skin is dry.     Neurological:      Mental Status: He is alert and oriented to person, place, and time. Mental status is at baseline.     Psychiatric:         Mood and Affect: Mood normal.         Behavior: Behavior normal.           "

## 2025-06-02 NOTE — ASSESSMENT & PLAN NOTE
Recent echocardiogram demonstrated normal atrial size.  I will request a 6-month follow-up with me but he should be following with heart failure specialist and electrophysiologist before that.

## 2025-06-10 ENCOUNTER — OFFICE VISIT (OUTPATIENT)
Dept: CARDIOLOGY CLINIC | Facility: CLINIC | Age: 50
End: 2025-06-10
Payer: COMMERCIAL

## 2025-06-10 VITALS
HEART RATE: 52 BPM | DIASTOLIC BLOOD PRESSURE: 64 MMHG | BODY MASS INDEX: 36.19 KG/M2 | HEIGHT: 64 IN | WEIGHT: 212 LBS | SYSTOLIC BLOOD PRESSURE: 110 MMHG | OXYGEN SATURATION: 98 %

## 2025-06-10 DIAGNOSIS — I50.22 CHRONIC HFREF (HEART FAILURE WITH REDUCED EJECTION FRACTION) (HCC): Primary | ICD-10-CM

## 2025-06-10 DIAGNOSIS — I35.1 NONRHEUMATIC AORTIC VALVE INSUFFICIENCY: ICD-10-CM

## 2025-06-10 DIAGNOSIS — I48.92 PAROXYSMAL ATRIAL FLUTTER (HCC): ICD-10-CM

## 2025-06-10 DIAGNOSIS — I34.0 NONRHEUMATIC MITRAL VALVE REGURGITATION: ICD-10-CM

## 2025-06-10 DIAGNOSIS — I77.810 DILATED AORTIC ROOT (HCC): ICD-10-CM

## 2025-06-10 PROCEDURE — 99205 OFFICE O/P NEW HI 60 MIN: CPT | Performed by: INTERNAL MEDICINE

## 2025-06-10 RX ORDER — SPIRONOLACTONE 25 MG/1
12.5 TABLET ORAL DAILY
Qty: 45 TABLET | Refills: 2 | Status: SHIPPED | OUTPATIENT
Start: 2025-06-10

## 2025-06-10 NOTE — PATIENT INSTRUCTIONS
Start spironolactone 12.5mg daily   Obtain BMP in 5 days  Obtain genetic testing   EP consultation for ICD evaluation  2g sodium diet  2L fluid diet  Daily weights, call office for weight gain of 3 lbs in a day or 5 lbs in 5-7 days.

## 2025-06-10 NOTE — PROGRESS NOTES
Advanced Heart Failure Outpatient Consult Note - Cricket Reyes 49 y.o. male MRN: [id number]    Encounter: [id number]      Assessment/Plan:    Patient Active Problem List    Diagnosis Date Noted    Bronchitis 04/17/2025    Seasonal allergies 04/17/2025    Chronic HFrEF (heart failure with reduced ejection fraction) ([facility]) 01/22/2025    Annual physical exam 10/22/2024    Pain and swelling of toe of left foot 10/22/2024    Allergic contact dermatitis 10/22/2024    Dilated cardiomyopathy (HCC) 10/16/2024    Mitral and aortic valve disease 10/16/2024    Dilated aortic root (HCC) 10/16/2024    Biatrial enlargement 10/16/2024    Paroxysmal atrial flutter (HCC) 03/23/2024    Anticoagulation management encounter 03/23/2024    H/O drug rash 03/23/2024     Assessment & Plan  Chronic HFrEF (heart failure with reduced ejection fraction) ([facility])  Etiology: nonischemic. Possible tachycardia mediated vs valvular with progression of aortic valve insufficiency with functional bicuspid aortic valve with fusion of right and noncoronary cusps vs possible familial with maternal aunt undergoing heart transplant (but also reported drug abuse)    Weight: 210 lbs 5/29/25: 213 lbs 6/2/25  BNP: 323 3/24/25    Studies- personally reviewed by me    TTE 5/29/25  LVEF: 30%  LVIDd: 7.5cm  RV: normal RV size and systolic function  AV: not well visualized, calcifications noted on noncoronary and right cusps; moderate to severe AI.   MR: trace  PASP: unable to estimate, trace TR  RVOT: no  notching  Other: no pericardial effusion    Cardiac MRI 5/15/2025: LVEF 31%  LVIDD 7.9 cm, normal wall thickness  Cardiac output 9.6 L/min  Normal RV size and systolic function  Dilated aortic root at 4.1 cm  No evidence of myocardial edema  No evidence of myocardial inflammation, infiltrative disease or scarring.     Left heart cath 2/14/2025: Normal epicardial coronary arteries, LVEDP 24 mmHg    TTE 12/12/2024:  LVEF 32%  LVIDD 7.1 cm, mild concentric LVH, suspected  grade 2 diastolic dysfunction  Normal RV size systolic function  Moderate to severe LAE, normal right atrial size  AV appears to have fused right and noncoronary cusps with calcifications, moderate to severe AI  Trace MR  Mild TR  No pericardial effusion  Dilated aortic root and dilated ascending aorta at 4.2 cm.    Neurohormonal Blockade:   --Beta-Blocker: Metoprolol succinate 25 mg twice daily  --ACEi, ARB or ARNi: Entresto 24-26 mg twice daily    (or SVR reduction)  --Aldosterone Receptor Blocker: No  --SGLT2-I: Jardiance 10 mg daily     Volume management:  --Diuretic: none    Sudden Cardiac Death Risk Reduction:  --ICD: None    Electrical Resynchronization:  --Candidacy for BiV device: IVCD,  msec on EKG 4/22/25    Advanced Therapies (if appropriate):  --We will continue to monitor  Paroxysmal atrial flutter (HCC)  Rate: metoprolol as above  Rhythm: none; s/p cardioversion in the ED 1/2024  AC: none  Last 2 week Zio 3/2025 with no atrial fibrillation/flutter detected.  Dilated aortic root (HCC)  Aortic root 4.2cm on last echo 5/29/25  Nonrheumatic mitral valve regurgitation  Mild on last echo 12/2024  Nonrheumatic aortic valve insufficiency  Moderate to severe. Suspected bicuspid aortic valve    Today's Plan:  Start spironolactone 12.5mg daily   Obtain BMP in 5 days  Obtain genetic testing   TRINITY for further evaluation of aortic valve anatomy and severity of AI  EP consultation for ICD evaluation  2g sodium diet  2L fluid diet  Daily weights, call office for weight gain of 3 lbs in a day or 5 lbs in 5-7 days.    HPI:   49 year old male with chronic heart failure with reduced EF, possible bicuspid aortic valve with moderate to severe AI, paroxysmal atrial flutter, dilated aortic root who is here to establish heart failure care. He was following with Dr. Graff.  Patient diagnosed with atrial flutter 1/2024 when he presented with palpitations and underwent cardioversion at the ED. He had an echocardiogram  during admission for cholecystitis which showed EF of 40%, dilated LV, moderate MR and mild AI. Then seen in outpatient follow up 10/2024 by Dr. Graff and repeat echo showed EF of 32%, moderate AI and trace MR. Underwent cardiac catheterization 2/14/25 which showed normal epicardial coronary arteries. Started on GDMT with losartan and metoprolol on follow up 3/11/25. Added Jardiance on subsequent visit 4/2025 and losartan switched to entresto on last visit 6/2/25. Cardiac MRI 5/1/5/25 showed EF 31%. No evidence of myocardial inflammation, infiltrative disease or scarring.  He currently does not have active cardiac symptoms. He denies shortness of breath or chest pain on current level of exertion. No leg swelling, PND or orthopnea. No dizziness, lightheadedness or palpitations. He reports taking medications as prescribed.  SH: no smoking or rug use; 2-4 drinks on the weekends. Works in a [employer] as   FH: Mother's side - aunt with heart transplant    Past Medical History[1]    Review of Systems   Constitutional:  Negative for chills and fever.   HENT:  Negative for ear pain and sore throat.    Eyes:  Negative for pain and visual disturbance.   Respiratory:  Negative for cough and shortness of breath.    Cardiovascular:  Negative for chest pain, palpitations and leg swelling.   Gastrointestinal:  Negative for abdominal distention, abdominal pain and vomiting.   Genitourinary:  Negative for dysuria and hematuria.   Musculoskeletal:  Negative for arthralgias and back pain.   Skin:  Negative for color change and rash.   Neurological:  Negative for dizziness, seizures, syncope and light-headedness.   All other systems reviewed and are negative.       Allergies[2]  .  Current Medications[3]    Social History     Socioeconomic History    Marital status: Single     Spouse name: Not on file    Number of children: Not on file    Years of education: Not on file    Highest education level: Not on file  "  Occupational History    Not on file   Tobacco Use    Smoking status: Never     Passive exposure: Never    Smokeless tobacco: Never   Vaping Use    Vaping status: Never Used   Substance and Sexual Activity    Alcohol use: Yes     Comment: social    Drug use: Not Currently    Sexual activity: Not on file   Other Topics Concern    Not on file   Social History Narrative    Not on file     Social Drivers of Health     Financial Resource Strain: Not on file   Food Insecurity: No Food Insecurity (2/5/2024)    Nursing - Inadequate Food Risk Classification     Worried About Running Out of Food in the Last Year: Never true     Ran Out of Food in the Last Year: Never true     Ran Out of Food in the Last Year: Not on file   Transportation Needs: No Transportation Needs (2/5/2024)    PRAPARE - Transportation     Lack of Transportation (Medical): No     Lack of Transportation (Non-Medical): No   Physical Activity: Not on file   Stress: Not on file   Social Connections: Not on file   Intimate Partner Violence: Not on file   Housing Stability: Low Risk  (2/5/2024)    Housing Stability Vital Sign     Unable to Pay for Housing in the Last Year: No     Number of Times Moved in the Last Year: 1     Homeless in the Last Year: No       Family History[4]    Physical Exam:    Vitals: Blood pressure 110/64, pulse (!) 52, height 5' 4\" (1.626 m), weight 96.2 kg (212 lb), SpO2 98%., Body mass index is 36.39 kg/m².,   Wt Readings from Last 3 Encounters:   06/10/25 96.2 kg (212 lb)   06/02/25 96.6 kg (213 lb)   05/29/25 95.3 kg (210 lb)       Physical Exam  Constitutional:       General: He is not in acute distress.     Appearance: Normal appearance.   HENT:      Head: Normocephalic and atraumatic.      Mouth/Throat:      Mouth: Mucous membranes are moist.     Eyes:      General: No scleral icterus.     Extraocular Movements: Extraocular movements intact.       Cardiovascular:      Rate and Rhythm: Normal rate and regular rhythm.      Pulses: " "Normal pulses.      Heart sounds: S1 normal and S2 normal. No murmur heard.     No friction rub. No gallop.   Pulmonary:      Breath sounds: Normal breath sounds.   Abdominal:      General: There is no distension.      Palpations: Abdomen is soft.      Tenderness: There is no abdominal tenderness. There is no guarding or rebound.     Musculoskeletal:         General: Normal range of motion.      Cervical back: Neck supple.     Skin:     General: Skin is warm and dry.      Capillary Refill: Capillary refill takes less than 2 seconds.     Neurological:      General: No focal deficit present.      Mental Status: He is alert and oriented to person, place, and time.     Psychiatric:         Mood and Affect: Mood normal.         Labs & Results:    Lab Results   Component Value Date    WBC 6.43 02/10/2025    HGB 14.8 02/10/2025    HCT 43.3 02/10/2025    MCV 91 02/10/2025     02/10/2025     Lab Results   Component Value Date    SODIUM 139 03/24/2025    K 4.8 03/24/2025     03/24/2025    CO2 26 03/24/2025    BUN 17 03/24/2025    CREATININE 1.18 03/24/2025    GLUC 104 02/14/2025    CALCIUM 9.3 03/24/2025     No results found for: \"NTBNP\"   Lab Results   Component Value Date    CHOLESTEROL 159 02/10/2025    CHOLESTEROL 145 02/14/2024    CHOLESTEROL 133 02/04/2024     Lab Results   Component Value Date    HDL 42 02/10/2025    HDL 27 (L) 02/04/2024     Lab Results   Component Value Date    TRIG 119 02/10/2025    TRIG 117 02/04/2024     No results found for: \"NONHDLC\"    EKG personally reviewed by Kandi Marcus MD.     Counseling / Coordination of Care  I have spent a total time of 65 minutes in caring for this patient on the day of the visit/encounter including Diagnostic results, Prognosis, Instructions for management, Patient and family education, Importance of tx compliance, Impressions, Counseling / Coordination of care, Documenting in the medical record, Reviewing/placing orders in the medical record " (including tests, medications, and/or procedures), and Obtaining or reviewing history  .    Thank you for the opportunity to participate in the care of this patient.    JAMAL BREAUX MD  ADVANCED HEART FAILURE AND MECHANICAL CIRCULATORY SUPPORT  [facility]           [1]   Past Medical History:  Diagnosis Date    Asthma     Heart murmur    [2]   Allergies  Allergen Reactions    Xarelto [Rivaroxaban] Rash   [3]   Current Outpatient Medications:     Cholecalciferol (VITAMIN D-3 PO), Take by mouth, Disp: , Rfl:     Empagliflozin (Jardiance) 10 MG TABS tablet, Take 1 tablet (10 mg total) by mouth every morning, Disp: 30 tablet, Rfl: 10    levOCARNitine (L-CARNITINE PO), Take by mouth, Disp: , Rfl:     MAGNESIUM PO, Take by mouth, Disp: , Rfl:     Menaquinone-7 (VITAMIN K2 PO), Take by mouth, Disp: , Rfl:     metoprolol succinate (TOPROL-XL) 25 mg 24 hr tablet, Take 1 tablet (25 mg total) by mouth 2 (two) times a day, Disp: 180 tablet, Rfl: 3    multivitamin (THERAGRAN) TABS, Take 1 tablet by mouth in the morning., Disp: , Rfl:     Omega-3 Fatty Acids (FISH OIL PO), Take by mouth, Disp: , Rfl:     Riboflavin (VITAMIN B-2 PO), Take by mouth, Disp: , Rfl:     sacubitril-valsartan (Entresto) 24-26 MG TABS, Take 1 tablet by mouth 2 (two) times a day, Disp: 60 tablet, Rfl: 3    spironolactone (ALDACTONE) 25 mg tablet, Take 0.5 tablets (12.5 mg total) by mouth daily, Disp: 45 tablet, Rfl: 2    VITAMIN E PO, Take by mouth, Disp: , Rfl:     brompheniramine-pseudoephedrine-DM 30-2-10 MG/5ML syrup, Take 5 mL by mouth 4 (four) times a day as needed for congestion, cough or allergies (Patient not taking: Reported on 6/2/2025), Disp: 120 mL, Rfl: 0    fluticasone (FLONASE) 50 mcg/act nasal spray, 1 spray into each nostril daily (Patient not taking: Reported on 4/22/2025), Disp: 15.8 mL, Rfl: 2  [4]   Family History  Problem Relation Name Age of Onset    Arthritis Father      LUDA disease Father      Cancer  Sister      Epididymitis Daughter      Diabetes Paternal Aunt      Diabetes Paternal Uncle

## 2025-06-10 NOTE — ASSESSMENT & PLAN NOTE
Etiology: nonischemic. Possible tachycardia mediated vs valvular with progression of aortic valve insufficiency with functional bicuspid aortic valve with fusion of right and noncoronary cusps vs possible familial with maternal aunt undergoing heart transplant (but also reported drug abuse)    Weight: 210 lbs 5/29/25: 213 lbs 6/2/25  BNP: 323 3/24/25    Studies- personally reviewed by me    TTE 5/29/25  LVEF: 30%  LVIDd: 7.5cm  RV: normal RV size and systolic function  AV: not well visualized, calcifications noted on noncoronary and right cusps; moderate to severe AI.   MR: trace  PASP: unable to estimate, trace TR  RVOT: no  notching  Other: no pericardial effusion    Cardiac MRI 5/15/2025: LVEF 31%  LVIDD 7.9 cm, normal wall thickness  Cardiac output 9.6 L/min  Normal RV size and systolic function  Dilated aortic root at 4.1 cm  No evidence of myocardial edema  No evidence of myocardial inflammation, infiltrative disease or scarring.     Left heart cath 2/14/2025: Normal epicardial coronary arteries, LVEDP 24 mmHg    TTE 12/12/2024:  LVEF 32%  LVIDD 7.1 cm, mild concentric LVH, suspected grade 2 diastolic dysfunction  Normal RV size systolic function  Moderate to severe LAE, normal right atrial size  AV appears to have fused right and noncoronary cusps with calcifications, moderate to severe AI  Trace MR  Mild TR  No pericardial effusion  Dilated aortic root and dilated ascending aorta at 4.2 cm.    Neurohormonal Blockade:   --Beta-Blocker: Metoprolol succinate 25 mg twice daily  --ACEi, ARB or ARNi: Entresto 24-26 mg twice daily    (or SVR reduction)  --Aldosterone Receptor Blocker: No  --SGLT2-I: Jardiance 10 mg daily     Volume management:  --Diuretic: none    Sudden Cardiac Death Risk Reduction:  --ICD: None    Electrical Resynchronization:  --Candidacy for BiV device: IVCD,  msec on EKG 4/22/25    Advanced Therapies (if appropriate):  --We will continue to monitor

## 2025-06-14 NOTE — ASSESSMENT & PLAN NOTE
Rate: metoprolol as above  Rhythm: none; s/p cardioversion in the ED 1/2024  AC: none  Last 2 week Zio 3/2025 with no atrial fibrillation/flutter detected.

## 2025-06-16 ENCOUNTER — TELEPHONE (OUTPATIENT)
Dept: CARDIOLOGY CLINIC | Facility: CLINIC | Age: 50
End: 2025-06-16

## 2025-06-16 NOTE — TELEPHONE ENCOUNTER
----- Message from Kandi Marcus MD sent at 6/14/2025  1:19 PM EDT -----  Regarding: TRINITY  Please let him know that after reviewing his echos, we will proceed with further evaluation of the aortic valve with TRINITY as we have discussed on his office visit. Thanks

## 2025-06-17 ENCOUNTER — APPOINTMENT (OUTPATIENT)
Dept: LAB | Facility: MEDICAL CENTER | Age: 50
End: 2025-06-17
Payer: COMMERCIAL

## 2025-06-17 DIAGNOSIS — I50.22 CHRONIC HFREF (HEART FAILURE WITH REDUCED EJECTION FRACTION) (HCC): ICD-10-CM

## 2025-06-17 LAB
ANION GAP SERPL CALCULATED.3IONS-SCNC: 9 MMOL/L (ref 4–13)
BUN SERPL-MCNC: 15 MG/DL (ref 5–25)
CALCIUM SERPL-MCNC: 8.7 MG/DL (ref 8.4–10.2)
CHLORIDE SERPL-SCNC: 108 MMOL/L (ref 96–108)
CO2 SERPL-SCNC: 24 MMOL/L (ref 21–32)
CREAT SERPL-MCNC: 1.06 MG/DL (ref 0.6–1.3)
GFR SERPL CREATININE-BSD FRML MDRD: 81 ML/MIN/1.73SQ M
GLUCOSE P FAST SERPL-MCNC: 106 MG/DL (ref 65–99)
POTASSIUM SERPL-SCNC: 3.5 MMOL/L (ref 3.5–5.3)
SODIUM SERPL-SCNC: 141 MMOL/L (ref 135–147)

## 2025-06-22 ENCOUNTER — RESULTS FOLLOW-UP (OUTPATIENT)
Dept: CARDIOLOGY CLINIC | Facility: CLINIC | Age: 50
End: 2025-06-22

## 2025-06-23 DIAGNOSIS — I35.1 NONRHEUMATIC AORTIC VALVE INSUFFICIENCY: Primary | ICD-10-CM

## 2025-06-24 ENCOUNTER — TELEPHONE (OUTPATIENT)
Dept: NON INVASIVE DIAGNOSTICS | Facility: HOSPITAL | Age: 50
End: 2025-06-24

## 2025-07-07 ENCOUNTER — TELEPHONE (OUTPATIENT)
Dept: NON INVASIVE DIAGNOSTICS | Facility: HOSPITAL | Age: 50
End: 2025-07-07

## 2025-07-08 LAB
GENE DIS ANL INTERP-IMP: NORMAL
GENE DIS ASSESSED: NORMAL
GENES TESTED: NORMAL
INTERPRETATION METHODS AND LIMITATIONS: NORMAL
Lab: NORMAL
SEQUENCING LOCATION: NORMAL

## 2025-07-09 ENCOUNTER — OFFICE VISIT (OUTPATIENT)
Dept: CARDIOLOGY CLINIC | Facility: CLINIC | Age: 50
End: 2025-07-09
Payer: COMMERCIAL

## 2025-07-09 ENCOUNTER — ANESTHESIA EVENT (OUTPATIENT)
Dept: ANESTHESIOLOGY | Facility: HOSPITAL | Age: 50
End: 2025-07-09

## 2025-07-09 ENCOUNTER — ANESTHESIA (OUTPATIENT)
Dept: ANESTHESIOLOGY | Facility: HOSPITAL | Age: 50
End: 2025-07-09

## 2025-07-09 VITALS
HEART RATE: 71 BPM | SYSTOLIC BLOOD PRESSURE: 130 MMHG | OXYGEN SATURATION: 97 % | WEIGHT: 214 LBS | DIASTOLIC BLOOD PRESSURE: 68 MMHG | HEIGHT: 64 IN | BODY MASS INDEX: 36.54 KG/M2

## 2025-07-09 DIAGNOSIS — I35.1 NONRHEUMATIC AORTIC VALVE INSUFFICIENCY: ICD-10-CM

## 2025-07-09 DIAGNOSIS — I77.810 DILATED AORTIC ROOT (HCC): ICD-10-CM

## 2025-07-09 DIAGNOSIS — I34.0 NONRHEUMATIC MITRAL VALVE REGURGITATION: ICD-10-CM

## 2025-07-09 DIAGNOSIS — I48.92 PAROXYSMAL ATRIAL FLUTTER (HCC): ICD-10-CM

## 2025-07-09 DIAGNOSIS — I50.22 CHRONIC HFREF (HEART FAILURE WITH REDUCED EJECTION FRACTION) (HCC): Primary | ICD-10-CM

## 2025-07-09 PROCEDURE — 99214 OFFICE O/P EST MOD 30 MIN: CPT | Performed by: INTERNAL MEDICINE

## 2025-07-09 NOTE — ASSESSMENT & PLAN NOTE
Etiology: nonischemic. 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  Euvolemic on exam    Weight: 210 lbs 5/29/25: 213 lbs 6/2/25; 214 lbs 7/9/25  BNP: 323 3/24/25    Studies- personally reviewed by me    TTE 5/29/25  LVEF: 30%  LVIDd: 7.5cm  RV: normal RV size and systolic function  AV: not well visualized, calcifications noted on noncoronary and right cusps; moderate to severe AI.   MR: trace  PASP: unable to estimate, trace TR  RVOT: no  notching  Other: no pericardial effusion    Cardiac MRI 5/15/2025: LVEF 31%  LVIDD 7.9 cm, normal wall thickness  Cardiac output 9.6 L/min  Normal RV size and systolic function  Dilated aortic root at 4.1 cm  No evidence of myocardial edema  No evidence of myocardial inflammation, infiltrative disease or scarring.     Left heart cath 2/14/2025: Normal epicardial coronary arteries, LVEDP 24 mmHg    TTE 12/12/2024:  LVEF 32%  LVIDD 7.1 cm, mild concentric LVH, suspected grade 2 diastolic dysfunction  Normal RV size systolic function  Moderate to severe LAE, normal right atrial size  AV appears to have fused right and noncoronary cusps with calcifications, moderate to severe AI  Trace MR  Mild TR  No pericardial effusion  Dilated aortic root and dilated ascending aorta at 4.2 cm.    Neurohormonal Blockade:   --Beta-Blocker: Metoprolol succinate 25 mg twice daily  --ACEi, ARB or ARNi: Entresto 24-26 mg twice daily    (or SVR reduction)  --Aldosterone Receptor Blocker: spironolactone 12.5mg daily  --SGLT2-I: Jardiance 10 mg daily     Volume management:  --Diuretic: none    Sudden Cardiac Death Risk Reduction:  --ICD: None    Electrical  Resynchronization:  --Candidacy for BiV device: IVCD,  msec on EKG 4/22/25    Advanced Therapies (if appropriate):  --We will continue to monitor

## 2025-07-09 NOTE — PATIENT INSTRUCTIONS
Increase spironolactone to 25mg daily on Friday, July 11th  Resume Jardiance after TRINITY tomorrow  Continue rest of cardiac medications  2g sodium diet  2L fluid diet  Daily weights, call office for weight gain of 3 lbs in a day or 5 lbs in 5-7 days

## 2025-07-09 NOTE — PROGRESS NOTES
Advanced Heart Failure Outpatient Progress Note - Cricket Reyes 49 y.o. male MRN: [id number]    Encounter: [id number]      Assessment/Plan:    Patient Active Problem List    Diagnosis Date Noted    Bronchitis 04/17/2025    Seasonal allergies 04/17/2025    Chronic HFrEF (heart failure with reduced ejection fraction) (HCC) 01/22/2025    Annual physical exam 10/22/2024    Pain and swelling of toe of left foot 10/22/2024    Allergic contact dermatitis 10/22/2024    Dilated cardiomyopathy (HCC) 10/16/2024    Mitral and aortic valve disease 10/16/2024    Dilated aortic root (HCC) 10/16/2024    Biatrial enlargement 10/16/2024    Paroxysmal atrial flutter (HCC) 03/23/2024    Anticoagulation management encounter 03/23/2024    H/O drug rash 03/23/2024     Assessment & Plan  Chronic HFrEF (heart failure with reduced ejection fraction) ([facility])  Etiology: nonischemic. 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  Euvolemic on exam    Weight: 210 lbs 5/29/25: 213 lbs 6/2/25; 214 lbs 7/9/25  BNP: 323 3/24/25    Studies- personally reviewed by me    TTE 5/29/25  LVEF: 30%  LVIDd: 7.5cm  RV: normal RV size and systolic function  AV: not well visualized, calcifications noted on noncoronary and right cusps; moderate to severe AI.   MR: trace  PASP: unable to estimate, trace TR  RVOT: no  notching  Other: no pericardial effusion    Cardiac MRI 5/15/2025: LVEF 31%  LVIDD 7.9 cm, normal wall thickness  Cardiac output 9.6 L/min  Normal RV size and systolic function  Dilated aortic root at 4.1 cm  No evidence of myocardial edema  No evidence of myocardial inflammation, infiltrative disease or scarring.     Left heart cath  2/14/2025: Normal epicardial coronary arteries, LVEDP 24 mmHg    TTE 12/12/2024:  LVEF 32%  LVIDD 7.1 cm, mild concentric LVH, suspected grade 2 diastolic dysfunction  Normal RV size systolic function  Moderate to severe LAE, normal right atrial size  AV appears to have fused right and noncoronary cusps with calcifications, moderate to severe AI  Trace MR  Mild TR  No pericardial effusion  Dilated aortic root and dilated ascending aorta at 4.2 cm.    Neurohormonal Blockade:   --Beta-Blocker: Metoprolol succinate 25 mg twice daily  --ACEi, ARB or ARNi: Entresto 24-26 mg twice daily    (or SVR reduction)  --Aldosterone Receptor Blocker: spironolactone 12.5mg daily  --SGLT2-I: Jardiance 10 mg daily     Volume management:  --Diuretic: none    Sudden Cardiac Death Risk Reduction:  --ICD: None    Electrical Resynchronization:  --Candidacy for BiV device: IVCD,  msec on EKG 4/22/25    Advanced Therapies (if appropriate):  --We will continue to monitor  Paroxysmal atrial flutter (HCC)  Rate: metoprolol as above  Rhythm: none; s/p cardioversion in the ED 1/2024  AC: none  Last 2 week Zio 3/2025 with no atrial fibrillation/flutter detected.  Dilated aortic root (HCC)  Aortic root 4.2cm on last echo 5/29/25  Nonrheumatic mitral valve regurgitation  Mild on last echo 12/2024   Nonrheumatic aortic valve insufficiency  Moderate to severe. Suspected bicuspid aortic valve     Today's Plan:  Increase spironolactone to 25mg daily on Friday, July 11th  Resume Jardiance after TRINITY tomorrow  Continue rest of cardiac medications  TRINITY for further evaluation of aortic valve anatomy and severity of AI - scheduled 7/10  EP consultation for ICD evaluation as scheduled  2g sodium diet  2L fluid diet  Daily weights, call office for weight gain of 3 lbs in a day or 5 lbs in 5-7 days.    HPI:   49 year old male with chronic heart failure with reduced EF, possible bicuspid aortic valve with moderate to severe AI, paroxysmal atrial flutter,  dilated aortic root who is here to establish heart failure care. He was following with Dr. Graff.  Patient diagnosed with atrial flutter 1/2024 when he presented with palpitations and underwent cardioversion at the ED. He had an echocardiogram during admission for cholecystitis which showed EF of 40%, dilated LV, moderate MR and mild AI. Then seen in outpatient follow up 10/2024 by Dr. Graff and repeat echo showed EF of 32%, moderate AI and trace MR. Underwent cardiac catheterization 2/14/25 which showed normal epicardial coronary arteries. Started on GDMT with losartan and metoprolol on follow up 3/11/25. Added Jardiance on subsequent visit 4/2025 and losartan switched to entresto on last visit 6/2/25. Cardiac MRI 5/1/5/25 showed EF 31%. No evidence of myocardial inflammation, infiltrative disease or scarring.  He currently does not have active cardiac symptoms. He denies shortness of breath or chest pain on current level of exertion. No leg swelling, PND or orthopnea. No dizziness, lightheadedness or palpitations. He reports taking medications as prescribed.  SH: no smoking or rug use; 2-4 drinks on the weekends. Works in a [employer] as   FH: Mother's side - aunt with heart transplant    7/9/2025: Patient is here for follow-up.  Spironolactone started on last office visit.  6/17/2025 sodium 141 potassium 3.5 creatinine 1.06 - stable.  He reports doing fine.  Denies shortness of breath or chest pain.  Denies leg swelling, PND orthopnea.  No palpitations, dizziness or lightheadedness.  Scheduled for TRINITY tomorrow.    Past Medical History[1]    Review of Systems   Constitutional:  Negative for chills and fever.   HENT:  Negative for ear pain and sore throat.    Eyes:  Negative for pain and visual disturbance.   Respiratory:  Negative for cough and shortness of breath.    Cardiovascular:  Negative for chest pain, palpitations and leg swelling.   Gastrointestinal:  Negative for abdominal distention,  "abdominal pain and vomiting.   Genitourinary:  Negative for dysuria and hematuria.   Musculoskeletal:  Negative for arthralgias and back pain.   Skin:  Negative for color change and rash.   Neurological:  Negative for dizziness, seizures, syncope and light-headedness.   All other systems reviewed and are negative.       Allergies[2]  .  Current Medications[3]    Social History     Socioeconomic History    Marital status: Single     Spouse name: Not on file    Number of children: Not on file    Years of education: Not on file    Highest education level: Not on file   Occupational History    Not on file   Tobacco Use    Smoking status: Never     Passive exposure: Never    Smokeless tobacco: Never   Vaping Use    Vaping status: Never Used   Substance and Sexual Activity    Alcohol use: Yes     Comment: social    Drug use: Not Currently    Sexual activity: Not on file   Other Topics Concern    Not on file   Social History Narrative    Not on file     Social Drivers of Health     Financial Resource Strain: Not on file   Food Insecurity: No Food Insecurity (2/5/2024)    Nursing - Inadequate Food Risk Classification     Worried About Running Out of Food in the Last Year: Never true     Ran Out of Food in the Last Year: Never true     Ran Out of Food in the Last Year: Not on file   Transportation Needs: No Transportation Needs (2/5/2024)    PRAPARE - Transportation     Lack of Transportation (Medical): No     Lack of Transportation (Non-Medical): No   Physical Activity: Not on file   Stress: Not on file   Social Connections: Not on file   Intimate Partner Violence: Not on file   Housing Stability: Low Risk  (2/5/2024)    Housing Stability Vital Sign     Unable to Pay for Housing in the Last Year: No     Number of Times Moved in the Last Year: 1     Homeless in the Last Year: No       Family History[4]    Physical Exam:    Vitals: Blood pressure 130/68, pulse 71, height 5' 4\" (1.626 m), weight 97.1 kg (214 lb), SpO2 97%., " "Body mass index is 36.73 kg/m².,   Wt Readings from Last 3 Encounters:   07/09/25 97.1 kg (214 lb)   06/10/25 96.2 kg (212 lb)   06/02/25 96.6 kg (213 lb)       Physical Exam  Constitutional:       General: He is not in acute distress.     Appearance: Normal appearance.   HENT:      Head: Normocephalic and atraumatic.      Mouth/Throat:      Mouth: Mucous membranes are moist.     Eyes:      General: No scleral icterus.     Extraocular Movements: Extraocular movements intact.     Neck:      Vascular: No JVD.     Cardiovascular:      Rate and Rhythm: Normal rate and regular rhythm.      Pulses: Normal pulses.      Heart sounds: S1 normal and S2 normal. No murmur heard.     No friction rub. No gallop.   Pulmonary:      Breath sounds: Normal breath sounds.   Abdominal:      General: There is no distension.      Palpations: Abdomen is soft.      Tenderness: There is no abdominal tenderness. There is no guarding or rebound.     Musculoskeletal:         General: Normal range of motion.      Cervical back: Neck supple.      Right lower leg: No edema.      Left lower leg: No edema.     Skin:     General: Skin is warm and dry.      Capillary Refill: Capillary refill takes less than 2 seconds.     Neurological:      General: No focal deficit present.      Mental Status: He is alert and oriented to person, place, and time.     Psychiatric:         Mood and Affect: Mood normal.         Labs & Results:    Lab Results   Component Value Date    WBC 6.43 02/10/2025    HGB 14.8 02/10/2025    HCT 43.3 02/10/2025    MCV 91 02/10/2025     02/10/2025     Lab Results   Component Value Date    SODIUM 141 06/17/2025    K 3.5 06/17/2025     06/17/2025    CO2 24 06/17/2025    BUN 15 06/17/2025    CREATININE 1.06 06/17/2025    GLUC 104 02/14/2025    CALCIUM 8.7 06/17/2025     No results found for: \"NTBNP\"   Lab Results   Component Value Date    CHOLESTEROL 159 02/10/2025    CHOLESTEROL 145 02/14/2024    CHOLESTEROL 133 02/04/2024 " "    Lab Results   Component Value Date    HDL 42 02/10/2025    HDL 27 (L) 02/04/2024     Lab Results   Component Value Date    TRIG 119 02/10/2025    TRIG 117 02/04/2024     No results found for: \"NONHDLC\"    EKG personally reviewed by Jamal Marcus MD.       Thank you for the opportunity to participate in the care of this patient.    JAMAL MARCUS MD  ADVANCED HEART FAILURE AND MECHANICAL CIRCULATORY SUPPORT  [facility]           [1]   Past Medical History:  Diagnosis Date    Asthma     Heart murmur    [2]   Allergies  Allergen Reactions    Xarelto [Rivaroxaban] Rash   [3]   Current Outpatient Medications:     Cholecalciferol (VITAMIN D-3 PO), Take by mouth, Disp: , Rfl:     Empagliflozin (Jardiance) 10 MG TABS tablet, Take 1 tablet (10 mg total) by mouth every morning, Disp: 30 tablet, Rfl: 10    levOCARNitine (L-CARNITINE PO), Take by mouth, Disp: , Rfl:     MAGNESIUM PO, Take by mouth, Disp: , Rfl:     Menaquinone-7 (VITAMIN K2 PO), Take by mouth, Disp: , Rfl:     metoprolol succinate (TOPROL-XL) 25 mg 24 hr tablet, Take 1 tablet (25 mg total) by mouth 2 (two) times a day, Disp: 180 tablet, Rfl: 3    multivitamin (THERAGRAN) TABS, Take 1 tablet by mouth in the morning., Disp: , Rfl:     Omega-3 Fatty Acids (FISH OIL PO), Take by mouth, Disp: , Rfl:     Riboflavin (VITAMIN B-2 PO), Take by mouth, Disp: , Rfl:     sacubitril-valsartan (Entresto) 24-26 MG TABS, Take 1 tablet by mouth 2 (two) times a day, Disp: 60 tablet, Rfl: 3    spironolactone (ALDACTONE) 25 mg tablet, Take 0.5 tablets (12.5 mg total) by mouth daily, Disp: 45 tablet, Rfl: 2    VITAMIN E PO, Take by mouth, Disp: , Rfl:     brompheniramine-pseudoephedrine-DM 30-2-10 MG/5ML syrup, Take 5 mL by mouth 4 (four) times a day as needed for congestion, cough or allergies (Patient not taking: Reported on 6/2/2025), Disp: 120 mL, Rfl: 0    fluticasone (FLONASE) 50 mcg/act nasal spray, 1 spray into each nostril daily (Patient " not taking: Reported on 4/22/2025), Disp: 15.8 mL, Rfl: 2  [4]   Family History  Problem Relation Name Age of Onset    Arthritis Father      LUDA disease Father      Cancer Sister      Epididymitis Daughter      Diabetes Paternal Aunt      Diabetes Paternal Uncle

## 2025-07-10 ENCOUNTER — HOSPITAL ENCOUNTER (OUTPATIENT)
Dept: NON INVASIVE DIAGNOSTICS | Facility: HOSPITAL | Age: 50
Discharge: HOME | End: 2025-07-10
Payer: COMMERCIAL

## 2025-07-10 ENCOUNTER — ANESTHESIA (OUTPATIENT)
Dept: NON INVASIVE DIAGNOSTICS | Facility: HOSPITAL | Age: 50
End: 2025-07-10
Payer: COMMERCIAL

## 2025-07-10 ENCOUNTER — ANESTHESIA EVENT (OUTPATIENT)
Dept: NON INVASIVE DIAGNOSTICS | Facility: HOSPITAL | Age: 50
End: 2025-07-10
Payer: COMMERCIAL

## 2025-07-10 VITALS
DIASTOLIC BLOOD PRESSURE: 58 MMHG | BODY MASS INDEX: 36.54 KG/M2 | OXYGEN SATURATION: 97 % | HEIGHT: 64 IN | HEART RATE: 61 BPM | WEIGHT: 214 LBS | RESPIRATION RATE: 16 BRPM | SYSTOLIC BLOOD PRESSURE: 114 MMHG

## 2025-07-10 DIAGNOSIS — I35.1 NONRHEUMATIC AORTIC VALVE INSUFFICIENCY: ICD-10-CM

## 2025-07-10 LAB
AORTIC ROOT: 4 CM
AORTIC VALVE MEAN VELOCITY: 13.8 M/S
ASCENDING AORTA: 4.2 CM
AV AREA BY CONTINUOUS VTI: 1.9 CM2
AV AREA PEAK VELOCITY: 1.9 CM2
AV LVOT MEAN GRADIENT: 1 MMHG
AV LVOT PEAK GRADIENT: 2 MMHG
AV MEAN PRESS GRAD SYS DOP V1V2: 8 MMHG
AV ORIFICE AREA US: 1.94 CM2
AV PEAK GRADIENT: 14 MMHG
AV VELOCITY RATIO: 0.4
DOP CALC AO VTI: 38.7 CM
DOP CALC LVOT AREA: 4.91
DOP CALC LVOT DIAMETER: 2.5 CM
DOP CALC LVOT PEAK VEL VTI: 15.3 CM
DOP CALC LVOT PEAK VEL: 0.71 M/S
DOP CALC LVOT STROKE INDEX: 37.3 ML/M2
DOP CALC LVOT STROKE VOLUME: 75.07
SL CV AV PEAK GRADIENT RETROGRADE: 64 MMHG
SL CV LV EF: 25

## 2025-07-10 PROCEDURE — 93312 ECHO TRANSESOPHAGEAL: CPT

## 2025-07-10 PROCEDURE — 93312 ECHO TRANSESOPHAGEAL: CPT | Performed by: INTERNAL MEDICINE

## 2025-07-10 RX ORDER — PROPOFOL 10 MG/ML
INJECTION, EMULSION INTRAVENOUS AS NEEDED
Status: DISCONTINUED | OUTPATIENT
Start: 2025-07-10 | End: 2025-07-10

## 2025-07-10 RX ORDER — SODIUM CHLORIDE 9 MG/ML
INJECTION, SOLUTION INTRAVENOUS CONTINUOUS PRN
Status: DISCONTINUED | OUTPATIENT
Start: 2025-07-10 | End: 2025-07-10

## 2025-07-10 RX ORDER — PROPOFOL 10 MG/ML
INJECTION, EMULSION INTRAVENOUS CONTINUOUS PRN
Status: DISCONTINUED | OUTPATIENT
Start: 2025-07-10 | End: 2025-07-10

## 2025-07-10 RX ORDER — LIDOCAINE HYDROCHLORIDE 10 MG/ML
INJECTION, SOLUTION EPIDURAL; INFILTRATION; INTRACAUDAL; PERINEURAL AS NEEDED
Status: DISCONTINUED | OUTPATIENT
Start: 2025-07-10 | End: 2025-07-10

## 2025-07-10 RX ORDER — FENTANYL CITRATE 50 UG/ML
INJECTION, SOLUTION INTRAMUSCULAR; INTRAVENOUS AS NEEDED
Status: DISCONTINUED | OUTPATIENT
Start: 2025-07-10 | End: 2025-07-10

## 2025-07-10 RX ADMIN — NOREPINEPHRINE BITARTRATE 3 MCG/MIN: 1 INJECTION, SOLUTION, CONCENTRATE INTRAVENOUS at 11:27

## 2025-07-10 RX ADMIN — SODIUM CHLORIDE: 0.9 INJECTION, SOLUTION INTRAVENOUS at 11:13

## 2025-07-10 RX ADMIN — FENTANYL CITRATE 25 MCG: 50 INJECTION INTRAMUSCULAR; INTRAVENOUS at 11:28

## 2025-07-10 RX ADMIN — PROPOFOL 80 MCG/KG/MIN: 10 INJECTION, EMULSION INTRAVENOUS at 11:27

## 2025-07-10 RX ADMIN — LIDOCAINE HYDROCHLORIDE 100 MG: 10 INJECTION, SOLUTION EPIDURAL; INFILTRATION; INTRACAUDAL; PERINEURAL at 11:28

## 2025-07-10 RX ADMIN — FENTANYL CITRATE 25 MCG: 50 INJECTION INTRAMUSCULAR; INTRAVENOUS at 11:32

## 2025-07-10 RX ADMIN — PROPOFOL 50 MG: 10 INJECTION, EMULSION INTRAVENOUS at 11:28

## 2025-07-10 NOTE — ANESTHESIA PREPROCEDURE EVALUATION
Procedure:  TRINITY    Relevant Problems   CARDIO   (+) Dilated aortic root (HCC)   (+) Mitral and aortic valve disease   (+) Paroxysmal atrial flutter (HCC)      PULMONARY   (-) URI (upper respiratory infection)      Cardiovascular/Peripheral Vascular   (+) Chronic HFrEF (heart failure with reduced ejection fraction) ([facility])      5/29/25    Physical Exam    Airway     Mallampati score: II  TM Distance: >3 FB  Neck ROM: full  Upper bite lip test: I  Mouth opening: >= 4 cm      Cardiovascular      Dental   No notable dental hx     Pulmonary      Neurological      Other Findings      Markedly dilated left ventricle cavity, LVIDD 7.5 cm, normal wall thickness, severely reduced left ventricular systolic function with global hypokinesis.  Ejection fraction is estimated as around 30%.  Suspected grade 2 diastolic dysfunction.  Decreased global longitudinal strain, -11.9%.  Normal right ventricular size and systolic function.  Normal left and right atrial cavity size by visual assessment.  Aortic valve leaflet sclerosis, severe aortic valve regurgitation by color flow Doppler assessment (although it appears moderate by pressure half-time Doppler assessment).  Mitral valve leaflet sclerosis, trace mitral valve regurgitation.  Trace tricuspid and pulmonic valve regurgitation.  No obvious pulmonary hypertension.  No pericardial effusion  Dilated aortic root.  Anesthesia Plan  ASA Score- 3     Anesthesia Type- IV sedation with anesthesia with ASA Monitors.         Additional Monitors:     Airway Plan: Nasal ETT.           Plan Factors-Exercise tolerance (METS): <4 METS.    Chart reviewed. EKG reviewed. Imaging results reviewed. Existing labs reviewed. Patient summary reviewed.    Patient is a current smoker.              Induction- intravenous.    Postoperative Plan- .   Monitoring Plan - Monitoring plan - standard ASA monitoring          Informed Consent- Anesthetic plan and risks discussed with patient.  I personally reviewed this  patient with the CRNA. Discussed and agreed on the Anesthesia Plan with the CRNA..      NPO Status:  Vitals Value Taken Time   Date of last liquid 07/10/25 07/10/25 09:59   Time of last liquid 0900 07/10/25 09:59   Date of last solid 07/09/25 07/10/25 09:59   Time of last solid 1700 07/10/25 09:59

## 2025-07-10 NOTE — ANESTHESIA POSTPROCEDURE EVALUATION
Post-Op Assessment Note    CV Status:  Stable    Pain management: adequate       Mental Status:  Awake and sleepy   Hydration Status:  Euvolemic   PONV Controlled:  Controlled   Airway Patency:  Patent     Post Op Vitals Reviewed: Yes    No anethesia notable event occurred.    Staff: CRNA           Last Filed PACU Vitals:  Vitals Value Taken Time   Temp     Pulse 58    /56    Resp 14    SpO2 98% RA

## 2025-07-11 ENCOUNTER — TELEPHONE (OUTPATIENT)
Dept: NON INVASIVE DIAGNOSTICS | Facility: HOSPITAL | Age: 50
End: 2025-07-11

## 2025-07-11 DIAGNOSIS — I35.1 SEVERE AORTIC VALVE REGURGITATION: ICD-10-CM

## 2025-07-11 DIAGNOSIS — Q23.81 BICUSPID AORTIC VALVE: Primary | ICD-10-CM

## 2025-07-15 ENCOUNTER — TELEPHONE (OUTPATIENT)
Dept: CARDIAC SURGERY | Facility: CLINIC | Age: 50
End: 2025-07-15

## 2025-07-23 ENCOUNTER — CLINICAL SUPPORT (OUTPATIENT)
Dept: CARDIOLOGY CLINIC | Facility: CLINIC | Age: 50
End: 2025-07-23
Payer: COMMERCIAL

## 2025-07-23 VITALS
DIASTOLIC BLOOD PRESSURE: 58 MMHG | SYSTOLIC BLOOD PRESSURE: 110 MMHG | WEIGHT: 211.6 LBS | OXYGEN SATURATION: 96 % | HEART RATE: 70 BPM | HEIGHT: 64 IN | BODY MASS INDEX: 36.12 KG/M2

## 2025-07-23 DIAGNOSIS — I48.92 PAROXYSMAL ATRIAL FLUTTER (HCC): Primary | ICD-10-CM

## 2025-07-23 PROCEDURE — 99211 OFF/OP EST MAY X REQ PHY/QHP: CPT

## 2025-07-23 NOTE — PROGRESS NOTES
Per Dr. Marcus patient presented to the office for a 2 week vitals check. Patient had no cardiac concerns. Vitals were checked and documented in chart.

## 2025-08-06 ENCOUNTER — CONSULT (OUTPATIENT)
Dept: CARDIAC SURGERY | Facility: CLINIC | Age: 50
End: 2025-08-06
Attending: INTERNAL MEDICINE
Payer: COMMERCIAL

## 2025-08-06 VITALS
BODY MASS INDEX: 36.02 KG/M2 | OXYGEN SATURATION: 96 % | SYSTOLIC BLOOD PRESSURE: 102 MMHG | HEART RATE: 58 BPM | HEIGHT: 64 IN | DIASTOLIC BLOOD PRESSURE: 50 MMHG | WEIGHT: 211 LBS

## 2025-08-06 DIAGNOSIS — Q23.81 BICUSPID AORTIC VALVE: ICD-10-CM

## 2025-08-06 DIAGNOSIS — Z13.6 ENCOUNTER FOR SCREENING FOR STENOSIS OF CAROTID ARTERY: ICD-10-CM

## 2025-08-06 DIAGNOSIS — I50.22 CHRONIC HFREF (HEART FAILURE WITH REDUCED EJECTION FRACTION) (HCC): ICD-10-CM

## 2025-08-06 DIAGNOSIS — I77.810 DILATED AORTIC ROOT (HCC): ICD-10-CM

## 2025-08-06 DIAGNOSIS — Z91.89 NEED FOR DENTAL CARE: ICD-10-CM

## 2025-08-06 DIAGNOSIS — I35.1 SEVERE AORTIC VALVE REGURGITATION: Primary | ICD-10-CM

## 2025-08-06 PROCEDURE — 99204 OFFICE O/P NEW MOD 45 MIN: CPT | Performed by: THORACIC SURGERY (CARDIOTHORACIC VASCULAR SURGERY)

## (undated) DEVICE — GUIDEWIRE .035 260CM 3MM J TIP

## (undated) DEVICE — INTENDED FOR TISSUE SEPARATION, AND OTHER PROCEDURES THAT REQUIRE A SHARP SURGICAL BLADE TO PUNCTURE OR CUT.: Brand: BARD-PARKER SAFETY BLADES SIZE 15, STERILE

## (undated) DEVICE — PACK PBDS LAP CHOLE RF

## (undated) DEVICE — TROCAR: Brand: KII FIOS FIRST ENTRY

## (undated) DEVICE — DRAPE C-ARM X-RAY

## (undated) DEVICE — LIGACLIP 12 MM ENDOSCOPIC ROTATING MULTIPLE CLIP APPLIER (LARGE): Brand: LIGACLIP

## (undated) DEVICE — IV EXTENSION TUBING 33 IN

## (undated) DEVICE — GUIDEWIRE WHOLEY .035 145 CM FLOP STR TIP

## (undated) DEVICE — ELECTRODE LAP L WIRE E-Z CLEAN 33CM -0100

## (undated) DEVICE — SYRINGE 20ML LL

## (undated) DEVICE — ADHESIVE SKIN HIGH VISCOSITY EXOFIN 1ML

## (undated) DEVICE — GLIDESHEATH BASIC HYDROPHILIC COATED INTRODUCER SHEATH: Brand: GLIDESHEATH

## (undated) DEVICE — SUT MONOCRYL 4-0 PS-2 18 IN Y496G

## (undated) DEVICE — STOPCOCK 3-WAY

## (undated) DEVICE — TROCAR: Brand: KII® SLEEVE

## (undated) DEVICE — SPECIMEN CONTAINER STERILE PEEL PACK

## (undated) DEVICE — TUBING SMOKE EVAC W/FILTRATION DEVICE PLUMEPORT ACTIV

## (undated) DEVICE — CATH DIAG 5FR IMPULSE 100CM FR4 MOD

## (undated) DEVICE — GLOVE SRG BIOGEL 7

## (undated) DEVICE — GLOVE INDICATOR PI UNDERGLOVE SZ 7.5 BLUE

## (undated) DEVICE — SUT VICRYL PLUS 0 UR-6 27IN VCP603H

## (undated) DEVICE — SYRINGE 10ML LL

## (undated) DEVICE — INSUFFLATION NEEDLE TO ESTABLISH PNEUMOPERITONEUM.: Brand: INSUFFLATION NEEDLE

## (undated) DEVICE — CATHETER KUMAR

## (undated) DEVICE — Device: Brand: OMNICLOSE TROCAR SITE CLOSURE DEVICE

## (undated) DEVICE — NEEDLE 25G X 1 1/2

## (undated) DEVICE — PENCIL ELECTROSURG E-Z CLEAN -0035H

## (undated) DEVICE — RADIFOCUS OPTITORQUE ANGIOGRAPHIC CATHETER: Brand: OPTITORQUE